# Patient Record
Sex: FEMALE | Race: WHITE | NOT HISPANIC OR LATINO | Employment: OTHER | URBAN - METROPOLITAN AREA
[De-identification: names, ages, dates, MRNs, and addresses within clinical notes are randomized per-mention and may not be internally consistent; named-entity substitution may affect disease eponyms.]

---

## 2017-01-02 ENCOUNTER — TELEPHONE (OUTPATIENT)
Dept: FAMILY MEDICINE | Facility: CLINIC | Age: 72
End: 2017-01-02

## 2017-01-02 NOTE — TELEPHONE ENCOUNTER
I spoke with Saige and reminded her that she is due for a mammogram.  She did not want to schedule at this time.

## 2017-01-31 DIAGNOSIS — F41.9 ANXIETY: Primary | ICD-10-CM

## 2017-01-31 DIAGNOSIS — F32.0 MAJOR DEPRESSIVE DISORDER, SINGLE EPISODE, MILD (H): ICD-10-CM

## 2017-01-31 DIAGNOSIS — J44.9 CHRONIC OBSTRUCTIVE PULMONARY DISEASE, UNSPECIFIED COPD TYPE (H): ICD-10-CM

## 2017-01-31 DIAGNOSIS — B37.0 THRUSH: ICD-10-CM

## 2017-01-31 RX ORDER — ALBUTEROL SULFATE 90 UG/1
2 AEROSOL, METERED RESPIRATORY (INHALATION) EVERY 6 HOURS
Qty: 3 INHALER | Refills: 0 | Status: SHIPPED | OUTPATIENT
Start: 2017-01-31 | End: 2017-04-19

## 2017-01-31 RX ORDER — BUPROPION HYDROCHLORIDE 75 MG/1
150 TABLET ORAL 2 TIMES DAILY
Qty: 360 TABLET | Refills: 0 | Status: SHIPPED | OUTPATIENT
Start: 2017-01-31 | End: 2017-04-07

## 2017-01-31 RX ORDER — TIOTROPIUM BROMIDE 18 UG/1
18 CAPSULE ORAL; RESPIRATORY (INHALATION) DAILY
Qty: 90 CAPSULE | Refills: 0 | Status: SHIPPED | OUTPATIENT
Start: 2017-01-31 | End: 2017-04-07

## 2017-01-31 NOTE — TELEPHONE ENCOUNTER
Ativan 0.5 mg tab      Last Written Prescription Date:  11/10/16  Last Fill Quantity: 10,   # refills: 0  Last Office Visit with [facility], [facility] or  Health prescribing provider: 2/16/16  Future Office visit:       Routing refill request to provider for review/approval because:  Drug not on the [facility], [facility] or [facility] refill protocol or controlled substance    Sertraline HCL 50 mg tab     Last Written Prescription Date: 11/10/16  Last Fill Quantity: 180, # refills: 0  Last Office Visit with [facility] primary care provider:  2/16/16        Last PHQ-9 score on record=   PHQ-9 SCORE 2/16/2016   Total Score -   Total Score 5     Bupropion 75 mg tab       Last Written Prescription Date: 11/10/16  Last Fill Quantity: 360; # refills: 0  Last Office Visit with [facility], [facility] or  Health prescribing provider:  2/16/16        Last PHQ-9 score on record=   PHQ-9 SCORE 2/16/2016   Total Score -   Total Score 5       AST       15   9/4/2014  ALT       14   9/4/2014    Ventolin HFA       Last Written Prescription Date: 11/10/16  Last Fill Quantity: 3, # refills: 0    Last Office Visit with [facility], [facility] or [facility] prescribing provider:  2/16/16   Future Office Visit:       Date of Last Asthma Action Plan Letter:   There are no preventive care reminders to display for this patient.   Asthma Control Test: No flowsheet data found.    Date of Last Spirometry Test:   No results found for this or any previous visit.    Advair Diskus 500/50      Last Written Prescription Date: 11/10/16  Last Fill Quantity: 3,  # refills: 0   Last Office Visit with [facility], [facility] or  Health prescribing provider: 2/16/16                                             Spiriva Handihaler cap      Last Written Prescription Date: 11/10/16  Last Fill Quantity: 90,  # refills: 0   Last Office Visit with [facility], [facility] or  Health prescribing provider: 2/16/16                                             Nystatin [id number]      Last Written Prescription Date: 11/10/16  Last Fill Quantity: 140 ml,  #  refills: 1   Last Office Visit with [facility], [facility] or [facility] prescribing provider: 2/16/16

## 2017-02-01 RX ORDER — LORAZEPAM 0.5 MG/1
0.5 TABLET ORAL EVERY 6 HOURS PRN
Qty: 10 TABLET | Refills: 0 | Status: SHIPPED | OUTPATIENT
Start: 2017-02-01 | End: 2017-04-07

## 2017-02-01 RX ORDER — NYSTATIN 100000/ML
SUSPENSION, ORAL (FINAL DOSE FORM) ORAL
Qty: 140 ML | Refills: 0 | Status: SHIPPED
Start: 2017-02-01 | End: 2017-04-07

## 2017-02-24 ENCOUNTER — TELEPHONE (OUTPATIENT)
Dept: FAMILY MEDICINE | Facility: CLINIC | Age: 72
End: 2017-02-24

## 2017-02-24 DIAGNOSIS — J44.1 COPD EXACERBATION (H): ICD-10-CM

## 2017-02-24 RX ORDER — PREDNISONE 20 MG/1
40 TABLET ORAL DAILY
Qty: 10 TABLET | Refills: 0 | Status: SHIPPED | OUTPATIENT
Start: 2017-02-24 | End: 2017-10-12

## 2017-02-24 RX ORDER — AZITHROMYCIN 250 MG/1
TABLET, FILM COATED ORAL
Qty: 6 TABLET | Refills: 0 | Status: SHIPPED | OUTPATIENT
Start: 2017-02-24 | End: 2017-03-16

## 2017-02-24 NOTE — TELEPHONE ENCOUNTER
Reason for Call:  Other     Detailed comments: Patient is starting to get sick with congestion and was told to call and get meds before she gets too sick - please call pt    Phone Number Patient can be reached at: Home number on file [phone] (home)    Best Time:     Can we leave a detailed message on this number? YES    Call taken on 2/24/2017 at 12:09 PM by Luciana Santiago

## 2017-02-28 ENCOUNTER — TELEPHONE (OUTPATIENT)
Dept: FAMILY MEDICINE | Facility: CLINIC | Age: 72
End: 2017-02-28

## 2017-02-28 PROBLEM — Z53.20 MAMMOGRAM DECLINED: Status: ACTIVE | Noted: 2017-02-28

## 2017-03-16 ENCOUNTER — RADIANT APPOINTMENT (OUTPATIENT)
Dept: GENERAL RADIOLOGY | Facility: CLINIC | Age: 72
End: 2017-03-16
Attending: NURSE PRACTITIONER
Payer: MEDICARE

## 2017-03-16 ENCOUNTER — OFFICE VISIT (OUTPATIENT)
Dept: FAMILY MEDICINE | Facility: CLINIC | Age: 72
End: 2017-03-16
Payer: MEDICARE

## 2017-03-16 VITALS
DIASTOLIC BLOOD PRESSURE: 74 MMHG | BODY MASS INDEX: 19.83 KG/M2 | TEMPERATURE: 98.4 F | OXYGEN SATURATION: 92 % | WEIGHT: 101 LBS | HEART RATE: 114 BPM | SYSTOLIC BLOOD PRESSURE: 130 MMHG | HEIGHT: 60 IN

## 2017-03-16 DIAGNOSIS — G89.29 CHRONIC LEFT SHOULDER PAIN: ICD-10-CM

## 2017-03-16 DIAGNOSIS — G89.29 CHRONIC LEFT SHOULDER PAIN: Primary | ICD-10-CM

## 2017-03-16 DIAGNOSIS — M25.512 CHRONIC LEFT SHOULDER PAIN: ICD-10-CM

## 2017-03-16 DIAGNOSIS — M53.3 PAIN IN THE COCCYX: ICD-10-CM

## 2017-03-16 DIAGNOSIS — M43.17 SPONDYLOLISTHESIS OF LUMBOSACRAL REGION: ICD-10-CM

## 2017-03-16 DIAGNOSIS — M25.512 CHRONIC LEFT SHOULDER PAIN: Primary | ICD-10-CM

## 2017-03-16 PROCEDURE — 99214 OFFICE O/P EST MOD 30 MIN: CPT | Performed by: NURSE PRACTITIONER

## 2017-03-16 PROCEDURE — 72220 X-RAY EXAM SACRUM TAILBONE: CPT

## 2017-03-16 PROCEDURE — 73030 X-RAY EXAM OF SHOULDER: CPT | Mod: LT

## 2017-03-16 ASSESSMENT — ANXIETY QUESTIONNAIRES
2. NOT BEING ABLE TO STOP OR CONTROL WORRYING: SEVERAL DAYS
3. WORRYING TOO MUCH ABOUT DIFFERENT THINGS: SEVERAL DAYS
6. BECOMING EASILY ANNOYED OR IRRITABLE: SEVERAL DAYS
5. BEING SO RESTLESS THAT IT IS HARD TO SIT STILL: NOT AT ALL
1. FEELING NERVOUS, ANXIOUS, OR ON EDGE: SEVERAL DAYS
7. FEELING AFRAID AS IF SOMETHING AWFUL MIGHT HAPPEN: NOT AT ALL
GAD7 TOTAL SCORE: 5

## 2017-03-16 ASSESSMENT — PATIENT HEALTH QUESTIONNAIRE - PHQ9: 5. POOR APPETITE OR OVEREATING: SEVERAL DAYS

## 2017-03-16 NOTE — MR AVS SNAPSHOT
After Visit Summary   3/16/2017    Tari Wiley    MRN: [id number]           Patient Information     Date Of Birth          1945        Visit Information        Provider Department      3/16/2017 9:20 AM Cynthia Guillen APRN [facility]        Today's Diagnoses     Pain in the coccyx    -  1    Chronic left shoulder pain        Spondylolisthesis of lumbosacral region          Care Instructions    Please call [phone] to schedule your CT scan    Physical therapy referral made    Follow up if symptoms do not improve or worsen.          Follow-ups after your visit        Additional Services     PHYSICAL THERAPY REFERRAL       *This therapy referral will be filtered to a centralized scheduling office at [facility] and the patient will receive a call to schedule an appointment at a [place] location most convenient for them. *     [facility] provides Physical Therapy evaluation and treatment and many specialty services across the [place] system.  If requesting a specialty program, please choose from the list below.    If you have not heard from the scheduling office within 2 business days, please call [phone] for all locations, with the exception of [place], please call [phone].  Treatment: Evaluation & Treatment  Special Instructions/Modalities:   Special Programs: None    Please be aware that coverage of these services is subject to the terms and limitations of your health insurance plan.  Call member services at your health plan with any benefit or coverage questions.      **Note to Provider:  If you are referring outside of [place] for the therapy appointment, please list the name of the location in the  special instructions  above, print the referral and give to the patient to schedule the appointment.                  Future tests that were ordered for you today     Open Future Orders        Priority  "Expected Expires Ordered    CT Lumbar Spine w/o Contrast Routine  3/16/2018 3/16/2017            Who to contact     If you have questions or need follow up information about today's clinic visit or your schedule please contact [facility] directly at [phone].  Normal or non-critical lab and imaging results will be communicated to you by MyChart, letter or phone within 4 business days after the clinic has received the results. If you do not hear from us within 7 days, please contact the clinic through [employer] or phone. If you have a critical or abnormal lab result, we will notify you by phone as soon as possible.  Submit refill requests through [employer] or call your pharmacy and they will forward the refill request to us. Please allow 3 business days for your refill to be completed.          Additional Information About Your Visit        MyChart Information     [employer] lets you send messages to your doctor, view your test results, renew your prescriptions, schedule appointments and more. To sign up, go to [email or web address] . Click on \"Log in\" on the left side of the screen, which will take you to the Welcome page. Then click on \"Sign up Now\" on the right side of the page.     You will be asked to enter the access code listed below, as well as some personal information. Please follow the directions to create your username and password.     Your access code is: [email or web address]  Expires: 2017 10:28 AM     Your access code will  in 90 days. If you need help or a new code, please call your [facility] or [phone].        Care EveryWhere ID     This is your Care EveryWhere ID. This could be used by other organizations to access your [facility] medical records  [id number]        Your Vitals Were     Pulse Temperature Height Pulse Oximetry BMI (Body Mass Index)       114 98.4  F (36.9  C) (Tympanic) 5' (1.524 m) 92% 19.73 kg/m2        Blood Pressure from Last 3 Encounters: "   03/16/17 130/74   02/16/16 132/75   08/19/15 134/59    Weight from Last 3 Encounters:   03/16/17 101 lb (45.8 kg)   02/16/16 101 lb 3.2 oz (45.9 kg)   08/19/15 92 lb (41.7 kg)              We Performed the Following     PHYSICAL THERAPY REFERRAL          Today's Medication Changes          These changes are accurate as of: 3/16/17 10:28 AM.  If you have any questions, ask your nurse or doctor.               Stop taking these medicines if you haven't already. Please contact your care team if you have questions.     azithromycin 250 MG tablet   Commonly known as:  ZITHROMAX   Stopped by:  Cynthia Guillen APRN CNP                    Primary Care Provider Office Phone # Fax #    Suzy Adonis Peters [phone]       [facility] [address] [address]        Thank you!     Thank you for choosing [facility]  for your care. Our goal is always to provide you with excellent care. Hearing back from our patients is one way we can continue to improve our services. Please take a few minutes to complete the written survey that you may receive in the mail after your visit with us. Thank you!             Your Updated Medication List - Protect others around you: Learn how to safely use, store and throw away your medicines at www.disposemymeds.org.          This list is accurate as of: 3/16/17 10:28 AM.  Always use your most recent med list.                   Brand Name Dispense Instructions for use    * albuterol (2.5 MG/3ML) 0.083% neb solution     1 Box    Take 1 vial (2.5 mg) by nebulization every 4 hours as needed for shortness of breath / dyspnea       * albuterol 108 (90 BASE) MCG/ACT Inhaler    PROAIR HFA/PROVENTIL HFA/VENTOLIN HFA    3 Inhaler    Inhale 2 puffs into the lungs every 6 hours       buPROPion 75 MG tablet    WELLBUTRIN    360 tablet    Take 2 tablets (150 mg) by mouth 2 times daily       diphenhydrAMINE-acetaminophen  MG tablet     TYLENOL PM     Take 1-2 tablets by mouth nightly as needed.       fluticasone-salmeterol 500-50 MCG/DOSE diskus inhaler    ADVAIR    3 Inhaler    Inhale 1 puff into the lungs every 12 hours       LORazepam 0.5 MG tablet    ATIVAN    10 tablet    Take 1 tablet (0.5 mg) by mouth every 6 hours as needed for anxiety       nystatin [id number] UNIT/ML suspension    MYCOSTATIN    140 mL    Take 5 mLs by mouth 2 times daily (before meals). Swish and spit. If the thrush is not improving you will need to go to 4 times a day, for 7 days       OXYGEN-HELIUM IN      2-3 liters       predniSONE 10 MG tablet    DELTASONE    15 tablet    Take 3 tablets (30 mg) by mouth daily       sertraline 50 MG tablet    ZOLOFT    180 tablet    Take 2 tablets (100 mg) by mouth daily       tiotropium 18 MCG capsule    SPIRIVA HANDIHALER    90 capsule    Inhale 1 capsule (18 mcg) into the lungs daily       TYLENOL 500 MG tablet   Generic drug:  acetaminophen      Take 2 tablets by mouth every 6 hours as needed.       * Notice:  This list has 2 medication(s) that are the same as other medications prescribed for you. Read the directions carefully, and ask your doctor or other care provider to review them with you.

## 2017-03-16 NOTE — NURSING NOTE
Chief Complaint   Patient presents with     Musculoskeletal Problem     left shoulder/arm        Initial /74 (Cuff Size: Adult Small)  Pulse 114  Temp 98.4  F (36.9  C) (Tympanic)  Ht 5' (1.524 m)  Wt 101 lb (45.8 kg)  SpO2 92%  BMI 19.73 kg/m2 Estimated body mass index is 19.73 kg/(m^2) as calculated from the following:    Height as of this encounter: 5' (1.524 m).    Weight as of this encounter: 101 lb (45.8 kg).  Medication Reconciliation: complete    Health Maintenance that is potentially due pending provider review:  Mammogram, Colonoscopy/FIT and PHQ9

## 2017-03-16 NOTE — PROGRESS NOTES
SUBJECTIVE:                                                    Tari Wiley is a 72 year old female who presents to clinic today for the following health issues:      Joint Pain     Onset: 2016    Description:   Location: left shoulder and arm   Character: Sharp with movement     Intensity: moderate    Progression of Symptoms: worse    Accompanying Signs & Symptoms:  Other symptoms: none   History:   Previous similar pain: no       Precipitating factors:   Trauma or overuse: YES- fall 2-3 month ago     Alleviating factors:  Improved by: not using it        Therapies Tried and outcome: none     One week later tripped on oxygen hose and fell on tailbone. Discomfort still present from this fall also.    Cough F/U-  -was prescribed Zithromax - would like lungs listened to.   F/U cough-improved with antibiotics      Problem list and histories reviewed & adjusted, as indicated.  Additional history: as documented    Patient Active Problem List   Diagnosis     Mild major depression (H)     Hyperlipidemia LDL goal <130     Personal history of smoking     Generalized anxiety disorder     Generalized weakness     Anemia     Thrush, oral     Advanced directives, counseling/discussion     Pneumonia     Gastroenteritis     Respiratory failure, acute (H)     COPD (chronic obstructive pulmonary disease) (HCC)     COPD exacerbation (H)     Health Care Home-Not active     Mammogram declined     Past Surgical History   Procedure Laterality Date     Appendectomy       Cholecystectomy, open       C/section, low transverse       , Low Transverse     Surgical history of -        stent to biliary tract     Cataract iol, rt/lt       right-2013       Social History   Substance Use Topics     Smoking status: Former Smoker     Packs/day: 4.00     Years: 36.00     Types: Cigarettes     Quit date: 1993     Smokeless tobacco: Never Used      Comment: Started at age 12     Alcohol use No     Family  History   Problem Relation Age of Onset     Alcohol/Drug Father      CANCER Brother          Current Outpatient Prescriptions   Medication Sig Dispense Refill     LORazepam (ATIVAN) 0.5 MG tablet Take 1 tablet (0.5 mg) by mouth every 6 hours as needed for anxiety 10 tablet 0     nystatin (MYCOSTATIN) 109813 UNIT/ML suspension Take 5 mLs by mouth 2 times daily (before meals). Swish and spit. If the thrush is not improving you will need to go to 4 times a day, for 7 days 140 mL 0     sertraline (ZOLOFT) 50 MG tablet Take 2 tablets (100 mg) by mouth daily 180 tablet 0     buPROPion (WELLBUTRIN) 75 MG tablet Take 2 tablets (150 mg) by mouth 2 times daily 360 tablet 0     albuterol (PROAIR HFA/PROVENTIL HFA/VENTOLIN HFA) 108 (90 BASE) MCG/ACT Inhaler Inhale 2 puffs into the lungs every 6 hours 3 Inhaler 0     fluticasone-salmeterol (ADVAIR) 500-50 MCG/DOSE diskus inhaler Inhale 1 puff into the lungs every 12 hours 3 Inhaler 0     tiotropium (SPIRIVA HANDIHALER) 18 MCG capsule Inhale 1 capsule (18 mcg) into the lungs daily 90 capsule 0     OXYGEN-HELIUM IN 2-3 liters       diphenhydrAMINE-acetaminophen (TYLENOL PM)  MG tablet Take 1-2 tablets by mouth nightly as needed.       acetaminophen (TYLENOL) 500 MG tablet Take 2 tablets by mouth every 6 hours as needed.       albuterol (2.5 MG/3ML) 0.083% nebulizer solution Take 1 vial (2.5 mg) by nebulization every 4 hours as needed for shortness of breath / dyspnea (Patient not taking: Reported on 3/16/2017) 1 Box 5     predniSONE (DELTASONE) 10 MG tablet Take 3 tablets (30 mg) by mouth daily (Patient not taking: Reported on 3/16/2017) 15 tablet 0     Allergies   Allergen Reactions     Sulfa Drugs Swelling and Difficulty breathing     Labs reviewed in EPIC    Reviewed and updated as needed this visit by clinical staff       Reviewed and updated as needed this visit by Provider         ROS:  Constitutional, HEENT, cardiovascular, pulmonary, gi and gu systems are negative,  except as otherwise noted.    OBJECTIVE:                                                    /74 (Cuff Size: Adult Small)  Pulse 114  Temp 98.4  F (36.9  C) (Tympanic)  Ht 5' (1.524 m)  Wt 101 lb (45.8 kg)  SpO2 92%  BMI 19.73 kg/m2  Body mass index is 19.73 kg/(m^2).  GENERAL: healthy, alert and no distress  RESP: decreased breath sounds throughout  CV: regular rate and rhythm, normal S1 S2, no S3 or S4, no murmur, click or rub  MS: tenderness to palpation left bicep, left shoulder range of motion and strength significantly limited by discomfort, mid coccyx with tenderness to palpation  PSYCH: mentation appears normal, affect normal/bright    Diagnostic Test Results:  Xray -   XR SHOULDER LT G/E 3 VW  3/16/2017 10:12 AM    HISTORY:  Pain in left shoulder, Other chronic pain    COMPARISON:  None.             Impression             IMPRESSION:  Negative.     NAHED WEBER MD           ASSESSMENT/PLAN:                                                      1. Chronic left shoulder pain  No fracture on x ray.  Recommend that [place] start PT for ongoing symptoms.  Offered injection in clinic today-[place] declined.  Symptomatic care and follow up discussed.  - XR Shoulder Left G/E 3 Views; Future  - PHYSICAL THERAPY REFERRAL    2. Spondylolisthesis of lumbosacral region  Concern for spondylolistesis on x ray-will do CT scan to evaluate.  No neuro symptoms present.  Symptomatic care and follow up discussed    - CT Lumbar Spine w/o Contrast; Future    3. Pain in the coccyx    - XR Sacrum and Coccyx 2 Views; Future    Home care instructions were reviewed with the patient. The risks, benefits and treatment options of prescribed medications or other treatments have been discussed with the patient. The patient verbalized their understanding and should call or follow up if no improvement or if they develop further problems.      Patient Instructions   Please call [phone] to schedule your CT scan    Physical therapy  referral made    Follow up if symptoms do not improve or worsen.        MARYJANE Klein [facility]

## 2017-03-16 NOTE — PATIENT INSTRUCTIONS
Please call [phone] to schedule your CT scan    Physical therapy referral made    Follow up if symptoms do not improve or worsen.

## 2017-03-17 ENCOUNTER — HOSPITAL ENCOUNTER (OUTPATIENT)
Dept: PHYSICAL THERAPY | Facility: CLINIC | Age: 72
Setting detail: THERAPIES SERIES
End: 2017-03-17
Attending: NURSE PRACTITIONER
Payer: MEDICARE

## 2017-03-17 PROCEDURE — 97161 PT EVAL LOW COMPLEX 20 MIN: CPT | Mod: GP | Performed by: PHYSICAL THERAPIST

## 2017-03-17 PROCEDURE — G8982 BODY POS GOAL STATUS: HCPCS | Mod: GP,CJ | Performed by: PHYSICAL THERAPIST

## 2017-03-17 PROCEDURE — 97140 MANUAL THERAPY 1/> REGIONS: CPT | Mod: GP | Performed by: PHYSICAL THERAPIST

## 2017-03-17 PROCEDURE — 97110 THERAPEUTIC EXERCISES: CPT | Mod: GP | Performed by: PHYSICAL THERAPIST

## 2017-03-17 PROCEDURE — G8981 BODY POS CURRENT STATUS: HCPCS | Mod: GP,CL | Performed by: PHYSICAL THERAPIST

## 2017-03-17 PROCEDURE — 40000718 ZZHC STATISTIC PT DEPARTMENT ORTHO VISIT: Performed by: PHYSICAL THERAPIST

## 2017-03-17 ASSESSMENT — PATIENT HEALTH QUESTIONNAIRE - PHQ9: SUM OF ALL RESPONSES TO PHQ QUESTIONS 1-9: 3

## 2017-03-17 ASSESSMENT — ANXIETY QUESTIONNAIRES: GAD7 TOTAL SCORE: 5

## 2017-03-17 NOTE — PROGRESS NOTES
03/17/17 1200   General Information   Type of Visit Initial OP Ortho PT Evaluation   Start of Care Date 03/17/17   Referring Physician Cynthia Guillen APRN CNP    Patient/Family Goals Statement reduce pain    Orders Evaluate and Treat   Date of Order 03/16/17   Insurance Type Medicare   Medical Diagnosis Chronic left shoulder pain M25.512, G89.29   Surgical/Medical history reviewed Yes   Precautions/Limitations no known precautions/limitations   Body Part(s)   Body Part(s) Shoulder   Presentation and Etiology   Pertinent history of current problem (include personal factors and/or comorbidities that impact the POC) Pt reports she had a fall in the house, she landed on forearm. X-ray negative  Pt is R handed.  Denies numbness and tingling.  Reaching  and getting dressed  is the worst. Pt does not need to return to the dark. PMH:    Impairments A. Pain;D. Decreased ROM;F. Decreased strength and endurance;G. Impaired balance;Q. Dizziness   Functional Limitations perform activities of daily living   Symptom Location L shoulder   Onset date of current episode/exacerbation 11/01/17   Chronicity New   Pain rating (0-10 point scale) Best (/10);Worst (/10)   Best (/10) 2   Worst (/10) 10   Pain quality A. Sharp;C. Aching;B. Dull;E. Shooting;F. Stabbing   Frequency of pain/symptoms A. Constant   Pain/symptoms exacerbated by C. Lifting;D. Carrying;G. Certain positions;H. Overhead reach;J. ADL;K. Home tasks   Pain/symptoms eased by C. Rest;D. Nothing;E. Changing positions   Progression of symptoms since onset: Worsened   Current Level of Function   Current Community Support Family/friend caregiver   Patient role/employment history C. Homemaker   Living environment [place]   Fall Risk Screen   Fall screen completed by PT   Per patient - Fall 2 or more times in past year? No   Per patient - Fall with injury in past year? Yes   Timed Up and Go score (seconds) 9 secs   Is patient a fall risk? 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    Shoulder Flexibility Comments biceps: 5/5, triceps: 5/5    Olivares-Marco Test pos   Sulcus Test neg   Crossover Test neg   Shoulder Special Tests Comments bear hug: neg, speed: pos   Palpation TTP medial boarder of scapular, RTC inseriont    Left Shoulder Flexion AROM 90   Left Shoulder Flexion PROM 140    Left Shoulder Abduction AROM 70   Left Shoulder Abduction PROM 90   Left Shoulder ER AROM C4   Left Shoulder ER PROM 70   Left Shoulder IR AROM L hip    Left Shoulder IR PROM 30    Left Shoulder Flexion Strength 4/5   Left Shoulder Abduction Strength 3/5   Left Shoulder ER Strength 4/5   Left Shoulder IR Strength 4/5   Planned Therapy Interventions   Planned Therapy Interventions balance training;joint mobilization;manual therapy;neuromuscular re-education;strengthening;ROM;stretching   Planned Modality Interventions   Planned Modality Interventions Cryotherapy;Electrical stimulation;Shortwave diathermy;Ultrasound   Clinical Impression   Criteria for Skilled Therapeutic Interventions Met yes, treatment indicated   PT Diagnosis decreased ROM associated with mechanical dysfunction   Influenced by the following impairments weakness, decreased ROM, pain   Functional limitations due to impairments lifting, ADL's, dressing   Clinical Presentation Stable/Uncomplicated   Clinical Presentation Rationale Pt is pleasant 72 yr old woman who presents s/p fall and L shoulder pain. pt denies hx of shoulder pain or fracture and is healthy besides COPD. Pt does report she hates exercise thus may not be compliant with HEP.    Clinical Decision Making (Complexity) Low complexity   Therapy Frequency 2 times/Week   Predicted Duration of Therapy Intervention (days/wks) 6 weeks   Risk & Benefits of therapy have been explained Yes    Patient, Family & other staff in agreement with plan of care Yes   Education Assessment   Preferred Learning Style Listening;Demonstration;Pictures/video;Reading   Barriers to Learning No barriers   ORTHO GOALS   PT Ortho Eval Goals 1;2;4;3   Ortho Goal 1   Goal Identifier ROM   Goal Description Pt will demonstrate full  GH AROM with less than 1/10 pain in order to be able to don/doff jacket/shirt to return to PLOF w/o  pain.   Target Date 04/07/17   Ortho Goal 2   Goal Identifier ROM   Goal Description Pt will demonstrate 180 deg GH AROM flex w less than 1/10 pain  to allow her to reach overhead  into cupboard to put dishes away without pain   Target Date 04/07/17   Ortho Goal 3   Goal Identifier mmt   Goal Description Pt will demonstrate 5/5 GH abd/add/ ER/IR in order to be able to return to PLOF and complete ADL's    Target Date 04/28/17   Ortho Goal 4   Goal Identifier SPADI   Goal Description Pt will report <20% disability on SPADI to demonstrate improved ability to complete daily activities and demonstrate significant clinical improvement   Target Date 04/28/17   Total Evaluation Time   Total Evaluation Time 50 (25 eval, 10 TE, 15 MT_)   Therapy Certification   Certification date from 03/17/17   Certification date to 04/28/17   Medical Diagnosis Chronic left shoulder pain M25.512, G89.29     Deepika Walton  Physical Therapist  [address] [address]  [email or web address]   [email or web address]   Office: [phone] Fax: [phone]

## 2017-03-17 NOTE — PROGRESS NOTES
[facility]          OUTPATIENT PHYSICAL THERAPY ORTHOPEDIC EVALUATION  PLAN OF TREATMENT FOR OUTPATIENT REHABILITATION  (COMPLETE FOR INITIAL CLAIMS ONLY)  Patient's Last Name, First Name, M.I.  YOB: 1945  Tari Wiley    Provider s Name:  [facility]   Medical Record No.  [id number]   Start of Care Date:  03/17/17   Onset Date:  11/01/17   Type:     _X__PT   ___OT   ___SLP Medical Diagnosis:  Chronic left shoulder pain M25.512, G89.29     PT Diagnosis:  decreased ROM associated with mechanical dysfunction   Visits from SOC:  1      _________________________________________________________________________________  Plan of Treatment/Functional Goals:  balance training, joint mobilization, manual therapy, neuromuscular re-education, strengthening, ROM, stretching     Cryotherapy, Electrical stimulation, Shortwave diathermy, Ultrasound     Goals  Goal Identifier: ROM  Goal Description: Pt will demonstrate full  GH AROM with less than 1/10 pain in order to be able to don/doff jacket/shirt to return to PLOF w/o  pain.  Target Date: 04/07/17    Goal Identifier: ROM  Goal Description: Pt will demonstrate 180 deg GH AROM flex w less than 1/10 pain  to allow her to reach overhead  into cupboard to put dishes away without pain  Target Date: 04/07/17    Goal Identifier: mmt  Goal Description: Pt will demonstrate 5/5 GH abd/add/ ER/IR in order to be able to return to PLOF and complete ADL's   Target Date: 04/28/17    Goal Identifier: SPADI  Goal Description: Pt will report <20% disability on SPADI to demonstrate improved ability to complete daily activities and demonstrate significant clinical improvement  Target Date: 04/28/17          Therapy Frequency:  2 times/Week  Predicted Duration of Therapy Intervention:  6 weeks    Deepika Walton, PT                 I CERTIFY THE NEED FOR THESE SERVICES FURNISHED UNDER        THIS PLAN OF TREATMENT AND WHILE UNDER MY  CARE     (Physician co-signature of this document indicates review and certification of the therapy plan).                       Certification Date From:  03/17/17   Certification Date To:  04/28/17    Referring Provider:  Cynthia Guillen APRN CNP     Initial Assessment        See Epic Evaluation Start of Care Date: 03/17/17

## 2017-03-23 ENCOUNTER — HOSPITAL ENCOUNTER (OUTPATIENT)
Dept: CT IMAGING | Facility: CLINIC | Age: 72
Discharge: HOME | End: 2017-03-23
Attending: NURSE PRACTITIONER | Admitting: NURSE PRACTITIONER
Payer: MEDICARE

## 2017-03-23 DIAGNOSIS — M43.17 SPONDYLOLISTHESIS OF LUMBOSACRAL REGION: ICD-10-CM

## 2017-03-23 PROCEDURE — 72131 CT LUMBAR SPINE W/O DYE: CPT

## 2017-03-24 ENCOUNTER — HOSPITAL ENCOUNTER (OUTPATIENT)
Dept: PHYSICAL THERAPY | Facility: CLINIC | Age: 72
Setting detail: THERAPIES SERIES
End: 2017-03-24
Attending: NURSE PRACTITIONER
Payer: MEDICARE

## 2017-03-24 PROCEDURE — 97110 THERAPEUTIC EXERCISES: CPT | Mod: GP | Performed by: PHYSICAL THERAPIST

## 2017-03-24 PROCEDURE — 40000718 ZZHC STATISTIC PT DEPARTMENT ORTHO VISIT: Performed by: PHYSICAL THERAPIST

## 2017-03-24 PROCEDURE — 97140 MANUAL THERAPY 1/> REGIONS: CPT | Mod: GP | Performed by: PHYSICAL THERAPIST

## 2017-03-31 ENCOUNTER — HOSPITAL ENCOUNTER (OUTPATIENT)
Dept: PHYSICAL THERAPY | Facility: CLINIC | Age: 72
Setting detail: THERAPIES SERIES
End: 2017-03-31
Attending: NURSE PRACTITIONER
Payer: MEDICARE

## 2017-03-31 PROCEDURE — 40000718 ZZHC STATISTIC PT DEPARTMENT ORTHO VISIT: Performed by: PHYSICAL THERAPIST

## 2017-03-31 PROCEDURE — G8982 BODY POS GOAL STATUS: HCPCS | Mod: GP,CJ | Performed by: PHYSICAL THERAPIST

## 2017-03-31 PROCEDURE — 97140 MANUAL THERAPY 1/> REGIONS: CPT | Mod: GP | Performed by: PHYSICAL THERAPIST

## 2017-03-31 PROCEDURE — G8983 BODY POS D/C STATUS: HCPCS | Mod: GP,CL | Performed by: PHYSICAL THERAPIST

## 2017-04-07 DIAGNOSIS — F41.9 ANXIETY: ICD-10-CM

## 2017-04-07 DIAGNOSIS — J44.9 CHRONIC OBSTRUCTIVE PULMONARY DISEASE, UNSPECIFIED COPD TYPE (H): ICD-10-CM

## 2017-04-07 DIAGNOSIS — B37.0 THRUSH: ICD-10-CM

## 2017-04-07 DIAGNOSIS — F32.0 MAJOR DEPRESSIVE DISORDER, SINGLE EPISODE, MILD (H): ICD-10-CM

## 2017-04-07 RX ORDER — NYSTATIN 100000/ML
SUSPENSION, ORAL (FINAL DOSE FORM) ORAL
Qty: 180 ML | Refills: 0 | Status: SHIPPED | OUTPATIENT
Start: 2017-04-07 | End: 2017-11-28

## 2017-04-07 RX ORDER — BUPROPION HYDROCHLORIDE 75 MG/1
TABLET ORAL
Qty: 360 TABLET | Refills: 0 | Status: SHIPPED | OUTPATIENT
Start: 2017-04-07 | End: 2017-10-27

## 2017-04-07 RX ORDER — TIOTROPIUM BROMIDE 18 UG/1
CAPSULE ORAL; RESPIRATORY (INHALATION)
Qty: 90 CAPSULE | Refills: 0 | Status: SHIPPED | OUTPATIENT
Start: 2017-04-07 | End: 2017-04-19

## 2017-04-07 RX ORDER — ALBUTEROL SULFATE 90 UG/1
AEROSOL, METERED RESPIRATORY (INHALATION)
Qty: 1 INHALER | Refills: 1 | Status: SHIPPED | OUTPATIENT
Start: 2017-04-07 | End: 2017-11-27

## 2017-04-07 RX ORDER — LORAZEPAM 0.5 MG/1
TABLET ORAL
Qty: 10 TABLET | Refills: 0 | Status: SHIPPED | OUTPATIENT
Start: 2017-04-07 | End: 2017-08-04

## 2017-04-07 NOTE — TELEPHONE ENCOUNTER
ventolin       Last Written Prescription Date: 1/31/2017  Last Fill Quantity: 3, # refills: 0    Last Office Visit with [facility], [email or web address] or [facility] prescribing provider:  3/16/2017   Future Office Visit:       Date of Last Asthma Action Plan Letter:   There are no preventive care reminders to display for this patient.   Asthma Control Test: No flowsheet data found.    Date of Last Spirometry Test:   No results found for this or any previous visit.    spiriva       Last Written Prescription Date: 1/31/2017  Last Fill Quantity: 90, # refills: 0    Last Office Visit with [facility], [email or web address] or [facility] prescribing provider:  3/16/2017   Future Office Visit:       Date of Last Asthma Action Plan Letter:   There are no preventive care reminders to display for this patient.   Asthma Control Test: No flowsheet data found.    Date of Last Spirometry Test:   No results found for this or any previous visit.    lorazepam      Last Written Prescription Date:  2/1/2017  Last Fill Quantity: 10,   # refills: 0  Last Office Visit with [facility], [email or web address] or [facility] prescribing provider: 3/16/2017  Future Office visit:       Routing refill request to provider for review/approval because:  Drug not on the [facility] or [facility] refill protocol or controlled substance

## 2017-04-19 DIAGNOSIS — J44.9 CHRONIC OBSTRUCTIVE PULMONARY DISEASE, UNSPECIFIED COPD TYPE (H): ICD-10-CM

## 2017-04-21 RX ORDER — ALBUTEROL SULFATE 90 UG/1
2 AEROSOL, METERED RESPIRATORY (INHALATION) EVERY 6 HOURS
Qty: 3 INHALER | Refills: 3 | Status: SHIPPED | OUTPATIENT
Start: 2017-04-21 | End: 2017-06-22

## 2017-04-21 RX ORDER — TIOTROPIUM BROMIDE 18 UG/1
CAPSULE ORAL; RESPIRATORY (INHALATION)
Qty: 90 CAPSULE | Refills: 3 | Status: SHIPPED | OUTPATIENT
Start: 2017-04-21 | End: 2017-06-22

## 2017-06-02 NOTE — PROGRESS NOTES
Outpatient Physical Therapy Discharge Note     Patient: Tari Wiley  : 1945    Beginning/End Dates of Reporting Period:  17 to 2017    Referring Provider: Cynthia Guillen CNP, MARYJANE    Therapy Diagnosis: decreased ROM associated with mechanical dysfunction     Client Self Report: Pt reports shoulder continues to be sore. She reports she got CT scan but didnt understand it. SHe reports she did not complete HEP because it hurts hr arm.     Objective Measurements:  Objective Measure: flex  Details: 110 in supine     Objective Measure: abd     Objective Measure: IR     Objective Measure: ER           Goals:  Goal Identifier ROM   Goal Description Pt will demonstrate full  GH AROM with less than 1/10 pain in order to be able to don/doff jacket/shirt to return to PLOF w/o  pain.   Target Date 17   Date Met      Progress:not met     Goal Identifier ROM   Goal Description Pt will demonstrate 180 deg GH AROM flex w less than 1/10 pain  to allow her to reach overhead  into cupboard to put dishes away without pain   Target Date 17   Date Met      Progress:not met     Goal Identifier mmt   Goal Description Pt will demonstrate 5/5 GH abd/add/ ER/IR in order to be able to return to PLOF and complete ADL's    Target Date 17   Date Met      Progress:not met     Goal Identifier SPADI   Goal Description Pt will report <20% disability on SPADI to demonstrate improved ability to complete daily activities and demonstrate significant clinical improvement   Target Date 17   Date Met      Progress:not assessed       Progress Toward Goals:   Progress this reporting period: Pt has been seen for 3 visits thus far in therapy. In that time, pt made very min gains and was encouraged to f/u with PCP. Pt encoruaged to rest, ice and complete HEP to get bettter as she was not very compliant. Pt has failed to schedule f/u visits within 30 days from last visit as instructed thus is being d/c from therapy  at this time      Plan:  Discharge from therapy.    Discharge:    Reason for Discharge: Patient has failed to schedule further appointments.    Equipment Issued: NA    Discharge Plan: Patient to continue home program.    Deepika Walton  Physical Therapist  [facility]  [address] [place] [id number]  [email or web address]   [email or web address]   Office: [phone] Fax: [phone]

## 2017-06-21 ENCOUNTER — TELEPHONE (OUTPATIENT)
Dept: FAMILY MEDICINE | Facility: CLINIC | Age: 72
End: 2017-06-21

## 2017-06-21 NOTE — TELEPHONE ENCOUNTER
"Reason for Call:  Other call back    Detailed comments: patient is calling stating she has been coughing for 3 days and is now coughing up green \"stuff\". She was told by Dr. Peters if this should ever happen, she should call in and Dr. Peters could prescribe something for her.    Phone Number Patient can be reached at: Home number on file [phone] (home)    Best Time: any    Can we leave a detailed message on this number? YES   Patient used the  [facility] in [place], [facility].  Dianna Tyler  [facility] [place] [facility]      Call taken on 6/21/2017 at 1:32 PM by Dianna Tyler      "

## 2017-06-21 NOTE — TELEPHONE ENCOUNTER
"S-(situation): I talked with Saige    B-(background): \"I have the same old stuff that I always have\".  No fever.  Not sick she says, just wanted to get antibiotic before got sick    A-(assessment): medication request    R-(recommendations): advised needs appointment.  Patient refused offer for appointment  Petra Colbert RN      "

## 2017-06-22 DIAGNOSIS — J44.9 CHRONIC OBSTRUCTIVE PULMONARY DISEASE, UNSPECIFIED COPD TYPE (H): ICD-10-CM

## 2017-06-22 RX ORDER — ALBUTEROL SULFATE 90 UG/1
2 AEROSOL, METERED RESPIRATORY (INHALATION) EVERY 6 HOURS
Qty: 4 INHALER | Refills: 3 | Status: SHIPPED | OUTPATIENT
Start: 2017-06-22 | End: 2017-11-27

## 2017-06-22 RX ORDER — TIOTROPIUM BROMIDE 18 UG/1
CAPSULE ORAL; RESPIRATORY (INHALATION)
Qty: 90 CAPSULE | Refills: 3 | Status: SHIPPED | OUTPATIENT
Start: 2017-06-22 | End: 2017-11-27

## 2017-06-22 NOTE — TELEPHONE ENCOUNTER
I am in process of applying to  assistance programs for Saige's Advair Diskus, Ventolin HFA & Spiriva Handihaler.    [employer] assistance and [employer] require hand signed, hard copy, brand name scripts be submitted with their applications.    Please hand sign BRAND name, hard copy scripts for: ADVAIR DISKUS, VENTOLIN HFA & SPIRIVA HANDIHALER    Please send the script to me via interoffice mail [employer] [facility], to Noy Pickens or via US mail  at:   [facility]   Noy Pickens   [address] [place]  [address]    Thanks so much for your help!    Noy Pickens  Prescription

## 2017-06-26 ENCOUNTER — TELEPHONE (OUTPATIENT)
Dept: FAMILY MEDICINE | Facility: CLINIC | Age: 72
End: 2017-06-26

## 2017-06-26 NOTE — TELEPHONE ENCOUNTER
Patient Assistance Program- [employer] form signed by Dr Peters and attached Rx's for Spiriva, Albiterol inhaler and Advair sent via inner office mail to Noy Pickens-Patient assistance Program-[facility]

## 2017-07-10 ENCOUNTER — TELEPHONE (OUTPATIENT)
Dept: FAMILY MEDICINE | Facility: CLINIC | Age: 72
End: 2017-07-10

## 2017-07-10 NOTE — TELEPHONE ENCOUNTER
ANKUSH Moulton has been approved to  assistance programs for Spiriva handihaler, Advair diskus & Ventolin HFA through December 2017.  She will receive this medication at no cost for the enrollment period.    A 90 day supply of Spiriva Handihaler's, Advair Diskus & Ventolin HFA will be delivered to Saige's home within 7-10 business days.    Saige will contact my office for refills as we must work directly with the .  I will note EPIC as each refill is requested.    Thanks so much for your help!    Noy Pickens  Prescription

## 2017-07-13 ENCOUNTER — TELEPHONE (OUTPATIENT)
Dept: FAMILY MEDICINE | Facility: CLINIC | Age: 72
End: 2017-07-13

## 2017-07-13 NOTE — TELEPHONE ENCOUNTER
Panel Management Review      Patient has the following on her problem list:     Depression / Dysthymia review  PHQ-9 SCORE 8/13/2015 2/16/2016 3/16/2017   Total Score 3 - -   Total Score - 5 3      Patient is due for:  DAP      IVD   ASA: FAILED    Last LDL:    Lab Results   Component Value Date    CHOL 264 02/26/2008     Lab Results   Component Value Date    HDL 71 02/26/2008     Lab Results   Component Value Date     02/26/2008     Lab Results   Component Value Date    TRIG 297 05/31/2012        Lab Results   Component Value Date    CHOLHDLRATIO 4.0 02/26/2008        Is the patient on a Statin? NO   Is the patient on Aspirin? NO                    Last three blood pressure readings:  BP Readings from Last 3 Encounters:   03/16/17 130/74   02/16/16 132/75   08/19/15 134/59        Tobacco History:     History   Smoking Status     Former Smoker     Packs/day: 4.00     Years: 36.00     Types: Cigarettes     Quit date: 12/16/1993   Smokeless Tobacco     Never Used     Comment: Started at age 12         Composite cancer screening  Chart review shows that this patient is due/due soon for the following Mammogram and Colonoscopy  Summary:    Patient is due/failing the following:   ASA, COLONOSCOPY, DAP, LDL, MAMMOGRAM and SPIROMETRY    Action needed:   Patient needs office visit for a physical with fasting labs.    Type of outreach:    Sent letter.    Questions for provider review:    None                                                                                                                                    Radha Padilla MA      Chart routed to Care Team .

## 2017-07-13 NOTE — LETTER
[facility]  [address] [place] [id number] [phone]      July 13, 2017      Tari Wiley  [address] [facility] [address]              Dear Tari Wiley,    Your clinic record indicates that you are due for complete physical exam and fasting labs. Please call the  at [phone] to schedule an appointment.     If we indicated that you are due for cholesterol testing, please come in fasting for twelve hours prior to the test.     You are also due for a colonoscopy and mammogram. Please call [facility] at [phone] to schedule your colonoscopy. You can schedule your mammogram at any of the locations below.  [facility] [place] ~ [phone]  One Day weekly- Alternating Days    [place] ~ [phone]  Every other Monday or Wednesday   & one Saturday morning a month    [employer] ~ [phone]  Every Other Monday morning    [place] ~ [phone]  Every other Monday afternoon    [place] ~ [phone]  Every Monday morning  Every Tuesday afternoon  Wed, Thurs, Friday morning & afternoon  If you have questions about this letter please contact your provider.    Sincerely,  Your [facility] Team/ ss

## 2017-08-04 DIAGNOSIS — F41.9 ANXIETY: ICD-10-CM

## 2017-08-07 RX ORDER — LORAZEPAM 0.5 MG/1
TABLET ORAL
Qty: 10 TABLET | Refills: 0 | Status: SHIPPED | OUTPATIENT
Start: 2017-08-07 | End: 2017-10-12

## 2017-08-07 NOTE — TELEPHONE ENCOUNTER
LORazepam (ATIVAN) 0.5 MG tablet      Last Written Prescription Date:  4/7/17  Last Fill Quantity: 10,   # refills: 0  Last Office Visit with [facility], [facility] or [facility] prescribing provider: 3/16/17  Future Office visit:       Routing refill request to provider for review/approval because:  Drug not on the [facility], [facility] or [facility] refill protocol or controlled substance

## 2017-10-04 ENCOUNTER — TELEPHONE (OUTPATIENT)
Dept: FAMILY MEDICINE | Facility: CLINIC | Age: 72
End: 2017-10-04

## 2017-10-04 NOTE — TELEPHONE ENCOUNTER
FYI~ A refill request has been made to the [employer] assistance programs for Advair & Spiriva.    A 90 day supply of ADVAIR DISKUS & SPIRIVA HANDIHALER will be delivered to Saige's home within 7-10 business days.    Noy Pickens  Prescription

## 2017-10-12 ENCOUNTER — TELEPHONE (OUTPATIENT)
Dept: FAMILY MEDICINE | Facility: CLINIC | Age: 72
End: 2017-10-12

## 2017-10-12 DIAGNOSIS — F41.9 ANXIETY: ICD-10-CM

## 2017-10-12 DIAGNOSIS — J44.1 COPD EXACERBATION (H): ICD-10-CM

## 2017-10-12 RX ORDER — AZITHROMYCIN 250 MG/1
TABLET, FILM COATED ORAL
Qty: 6 TABLET | Refills: 0 | Status: SHIPPED | OUTPATIENT
Start: 2017-10-12 | End: 2017-11-27

## 2017-10-12 RX ORDER — LORAZEPAM 0.5 MG/1
0.5 TABLET ORAL EVERY 6 HOURS
Qty: 10 TABLET | Refills: 0 | Status: SHIPPED | OUTPATIENT
Start: 2017-10-12 | End: 2017-11-27

## 2017-10-12 RX ORDER — LORAZEPAM 0.5 MG/1
0.5 TABLET ORAL EVERY 6 HOURS
Qty: 10 TABLET | Refills: 0 | Status: SHIPPED | OUTPATIENT
Start: 2017-10-12 | End: 2017-10-12

## 2017-10-12 RX ORDER — PREDNISONE 20 MG/1
40 TABLET ORAL DAILY
Qty: 10 TABLET | Refills: 0 | Status: SHIPPED | OUTPATIENT
Start: 2017-10-12 | End: 2017-11-27

## 2017-10-12 RX ORDER — LORAZEPAM 0.5 MG/1
TABLET ORAL
Qty: 10 TABLET | Refills: 0 | Status: SHIPPED | OUTPATIENT
Start: 2017-10-12 | End: 2017-10-12

## 2017-10-12 NOTE — TELEPHONE ENCOUNTER
Pt has chest tightness and green sputum. She would like antibiotics.  Please send to [facility] in [place].

## 2017-10-12 NOTE — TELEPHONE ENCOUNTER
LORazepam (ATIVAN) 0.5 MG tablet      Last Written Prescription Date:  8/7/*17  Last Fill Quantity: 10,   # refills: 0  Last Office Visit with [facility] provider: 3/16/17  Future Office visit:    Next 5 appointments (look out 90 days)     Oct 16, 2017  9:20 AM CDT   SHORT with Suzy Peters MD   [facility]    [address] [place] [id number]   [phone]                   Routing refill request to provider for review/approval because:  Drug not on the [facility] or [facility] refill protocol or controlled substance

## 2017-10-15 ENCOUNTER — TELEPHONE (OUTPATIENT)
Dept: FAMILY MEDICINE | Facility: CLINIC | Age: 72
End: 2017-10-15

## 2017-10-15 NOTE — TELEPHONE ENCOUNTER
Spoke with patient    She is due for OV with primary    Had appt made for 10/16/17 and wanted that to be cancelled     Will schedule appt when ready    Minerva Saba MA

## 2017-10-27 DIAGNOSIS — F32.0 MAJOR DEPRESSIVE DISORDER, SINGLE EPISODE, MILD (H): ICD-10-CM

## 2017-10-30 RX ORDER — BUPROPION HYDROCHLORIDE 75 MG/1
TABLET ORAL
Qty: 360 TABLET | Refills: 0 | Status: SHIPPED | OUTPATIENT
Start: 2017-10-30 | End: 2017-11-27

## 2017-11-27 ENCOUNTER — OFFICE VISIT (OUTPATIENT)
Dept: FAMILY MEDICINE | Facility: CLINIC | Age: 72
End: 2017-11-27
Payer: MEDICARE

## 2017-11-27 VITALS
SYSTOLIC BLOOD PRESSURE: 118 MMHG | WEIGHT: 98.4 LBS | DIASTOLIC BLOOD PRESSURE: 76 MMHG | BODY MASS INDEX: 19.22 KG/M2 | TEMPERATURE: 98.5 F | HEART RATE: 118 BPM | OXYGEN SATURATION: 90 %

## 2017-11-27 DIAGNOSIS — R53.1 GENERALIZED WEAKNESS: ICD-10-CM

## 2017-11-27 DIAGNOSIS — Z23 NEED FOR PROPHYLACTIC VACCINATION AND INOCULATION AGAINST INFLUENZA: ICD-10-CM

## 2017-11-27 DIAGNOSIS — F41.9 ANXIETY: ICD-10-CM

## 2017-11-27 DIAGNOSIS — F32.0 MAJOR DEPRESSIVE DISORDER, SINGLE EPISODE, MILD (H): ICD-10-CM

## 2017-11-27 DIAGNOSIS — J44.9 CHRONIC OBSTRUCTIVE PULMONARY DISEASE, UNSPECIFIED COPD TYPE (H): Primary | ICD-10-CM

## 2017-11-27 DIAGNOSIS — K21.9 GASTROESOPHAGEAL REFLUX DISEASE WITHOUT ESOPHAGITIS: ICD-10-CM

## 2017-11-27 PROCEDURE — 99214 OFFICE O/P EST MOD 30 MIN: CPT | Mod: 25 | Performed by: FAMILY MEDICINE

## 2017-11-27 PROCEDURE — G0008 ADMIN INFLUENZA VIRUS VAC: HCPCS | Performed by: FAMILY MEDICINE

## 2017-11-27 PROCEDURE — 90662 IIV NO PRSV INCREASED AG IM: CPT | Performed by: FAMILY MEDICINE

## 2017-11-27 RX ORDER — LORAZEPAM 0.5 MG/1
0.5 TABLET ORAL EVERY 6 HOURS
Qty: 10 TABLET | Refills: 0 | Status: SHIPPED | OUTPATIENT
Start: 2017-11-27 | End: 2017-11-27

## 2017-11-27 RX ORDER — TIOTROPIUM BROMIDE 18 UG/1
CAPSULE ORAL; RESPIRATORY (INHALATION)
Qty: 90 CAPSULE | Refills: 3 | Status: SHIPPED | OUTPATIENT
Start: 2017-11-27 | End: 2018-02-20

## 2017-11-27 RX ORDER — ALBUTEROL SULFATE 0.83 MG/ML
1 SOLUTION RESPIRATORY (INHALATION) EVERY 4 HOURS PRN
Qty: 1 BOX | Refills: 5 | Status: SHIPPED | OUTPATIENT
Start: 2017-11-27 | End: 2021-05-11

## 2017-11-27 RX ORDER — BUPROPION HYDROCHLORIDE 75 MG/1
TABLET ORAL
Qty: 360 TABLET | Refills: 3 | Status: SHIPPED | OUTPATIENT
Start: 2017-11-27 | End: 2018-12-27

## 2017-11-27 RX ORDER — ALBUTEROL SULFATE 90 UG/1
2 AEROSOL, METERED RESPIRATORY (INHALATION) EVERY 6 HOURS
Qty: 4 INHALER | Refills: 3 | Status: SHIPPED | OUTPATIENT
Start: 2017-11-27 | End: 2018-02-20

## 2017-11-27 RX ORDER — SUCRALFATE 1 G/1
1 TABLET ORAL 4 TIMES DAILY
Qty: 120 TABLET | Refills: 1 | Status: SHIPPED | OUTPATIENT
Start: 2017-11-27 | End: 2019-03-07

## 2017-11-27 RX ORDER — LORAZEPAM 0.5 MG/1
0.5 TABLET ORAL EVERY 6 HOURS
Qty: 20 TABLET | Refills: 5 | Status: SHIPPED | OUTPATIENT
Start: 2017-11-27 | End: 2018-05-26

## 2017-11-27 ASSESSMENT — PATIENT HEALTH QUESTIONNAIRE - PHQ9
SUM OF ALL RESPONSES TO PHQ QUESTIONS 1-9: 5
5. POOR APPETITE OR OVEREATING: SEVERAL DAYS

## 2017-11-27 ASSESSMENT — ANXIETY QUESTIONNAIRES
1. FEELING NERVOUS, ANXIOUS, OR ON EDGE: MORE THAN HALF THE DAYS
6. BECOMING EASILY ANNOYED OR IRRITABLE: NOT AT ALL
2. NOT BEING ABLE TO STOP OR CONTROL WORRYING: SEVERAL DAYS
3. WORRYING TOO MUCH ABOUT DIFFERENT THINGS: SEVERAL DAYS
5. BEING SO RESTLESS THAT IT IS HARD TO SIT STILL: SEVERAL DAYS
GAD7 TOTAL SCORE: 6
7. FEELING AFRAID AS IF SOMETHING AWFUL MIGHT HAPPEN: NOT AT ALL

## 2017-11-27 NOTE — PROGRESS NOTES
SUBJECTIVE:   Tari Wiley is a 72 year old female who presents to clinic today for the following health issues:      Depression and Anxiety Follow-Up    Status since last visit: Worsened she is more anxious and more depressed     Cries often and is not wanting to get out of bed    Has no appetite and does not eat well     Is very sad she is not able to do what she wants due to COPD    She is dependent on so many family members and feels guilty about this     Other associated symptoms:None    Complicating factors:     Significant life event: Yes-  Son in law passed away at [facility]     Current substance abuse: None    PHQ-9 Score and MyChart F/U Questions 2/16/2016 3/16/2017 11/27/2017   Total Score 5 3 5   Q9: Suicide Ideation Not at all Not at all Not at all     ROE-7 SCORE 2/16/2016 3/16/2017 11/27/2017   Total Score - - -   Total Score 0 5 6     In the past two weeks have you had thoughts of suicide or self-harm?  No.    Do you have concerns about your personal safety or the safety of others?   No    PHQ-9  English  PHQ-9   Any Language  GAD7  Suicide Assessment Five-step Evaluation and Treatment (SAFE-T)      COPD Follow-Up    Symptoms are currently: worse with exertion    Current fatigue or dyspnea with ambulation: none    Shortness of breath: stable    Increased or change in Cough/Sputum: Yes-      Fever(s): No    Baseline ambulation without stopping to rest:  1 blocks. Able to walk up unknown flights of stairs without stopping to rest.    Any ER/UC or hospital admissions since your last visit? No     History   Smoking Status     Former Smoker     Packs/day: 4.00     Years: 36.00     Types: Cigarettes     Quit date: 12/16/1993   Smokeless Tobacco     Never Used     Comment: Started at age 12     No results found for: FEV1, PMD9WAE  She has some GERD sxs and nausea and so does not eat much due to this is generally weak and has no energy she has lost 3 lbs   She is worried about getting to heavy and  this making it hearder for her to breath          Amount of exercise or physical activity: None    Problems taking medications regularly: Yes,  Difficulty swallowing pills, chokes on them    Medication side effects: none  Diet: regular (no restrictions)          Problem list and histories reviewed & adjusted, as indicated.  Additional history: as documented    Labs reviewed in EPIC    Reviewed and updated as needed this visit by clinical staffTobacco  Allergies  Meds  Problems  Med Hx  Surg Hx  Fam Hx  Soc Hx        Reviewed and updated as needed this visit by Provider  Allergies  Meds  Problems         ROS:  Constitutional, HEENT, cardiovascular, pulmonary, gi and gu systems are negative, except as otherwise noted.      OBJECTIVE:                                                    /76 (BP Location: Right arm, Patient Position: Chair, Cuff Size: Adult Regular)  Pulse 118  Temp 98.5  F (36.9  C) (Tympanic)  Wt 98 lb 6.4 oz (44.6 kg)  SpO2 90%  BMI 19.22 kg/m2  Body mass index is 19.22 kg/(m^2).  GENERAL APPEARANCE: healthy, alert and no distress  RESP: lungs clear to auscultation - no rales, rhonchi or wheezes very distant breath sounds   CV: regular rates and rhythm, normal S1 S2, no S3 or S4 and no murmur, click or rub  ABDOMEN: soft, nontender, without hepatosplenomegaly or masses and bowel sounds normal  MS: extremities normal- no gross deformities noted  PSYCH: mentation appears normal, anxious, crying and worried         ASSESSMENT/PLAN:                                                    1. Chronic obstructive pulmonary disease, unspecified COPD type (H)  Stable no change in treatment plan.   - ADVAIR DISKUS 500-50 MCG/DOSE diskus inhaler; INHALE ONE DOSE BY MOUTH EVERY 12 HOURS  Dispense: 3 Inhaler; Refill: 3  - albuterol (2.5 MG/3ML) 0.083% neb solution; Take 1 vial (2.5 mg) by nebulization every 4 hours as needed for shortness of breath / dyspnea  Dispense: 1 Box; Refill: 5  - albuterol  (PROAIR HFA/PROVENTIL HFA/VENTOLIN HFA) 108 (90 BASE) MCG/ACT Inhaler; Inhale 2 puffs into the lungs every 6 hours  Dispense: 4 Inhaler; Refill: 3  - SPIRIVA HANDIHALER 18 MCG capsule; INHALE ONE DOSE BY MOUTH ONCE DAILY  Dispense: 90 capsule; Refill: 3    2. Major depressive disorder, single episode, mild (H)  Worse   - buPROPion (WELLBUTRIN) 75 MG tablet; TAKE TWO TABLETS BY MOUTH TWICE DAILY  Dispense: 360 tablet; Refill: 3  - sertraline (ZOLOFT) 50 MG tablet; TAKE TWO TABLETS BY MOUTH ONCE DAILY  Dispense: 180 tablet; Refill: 3    3. Anxiety  Worse   - LORazepam (ATIVAN) 0.5 MG tablet; Take 1 tablet (0.5 mg) by mouth every 6 hours  Dispense: 20 tablet; Refill: 5    4. Generalized weakness  Due to poor nutrition     5. Gastroesophageal reflux disease without esophagitis    - sucralfate (CARAFATE) 1 GM tablet; Take 1 tablet (1 g) by mouth 4 times daily  Dispense: 120 tablet; Refill: 1    6. Need for prophylactic vaccination and inoculation against influenza    - FLU VACCINE, INCREASED ANTIGEN, PRESV FREE, AGE 65+ [34506]  - ADMIN INFLUENZA (For MEDICARE Patients ONLY) []      Patient Instructions   Medications are refilled     Ativan as needed for anxiety and SOA     Eat more protein as discussed     See me back in the spring     Pt will call with update in one month and will adjust meds if needed     Risks, benefits, side effects and rationale for treatment plan fully discussed with the patient and understanding expressed.     Suzy Peters MD  [facility]  '  Injectable Influenza Immunization Documentation    1.  Is the person to be vaccinated sick today?   No    2. Does the person to be vaccinated have an allergy to a component   of the vaccine?   No  Egg Allergy Algorithm Link    3. Has the person to be vaccinated ever had a serious reaction   to influenza vaccine in the past?   No    4. Has the person to be vaccinated ever had Guillain-Barré syndrome?   No    Form completed by  patient

## 2017-11-27 NOTE — MR AVS SNAPSHOT
"              After Visit Summary   11/27/2017    Tari Wiley    MRN: [id number]           Patient Information     Date Of Birth          1945        Visit Information        Provider Department      11/27/2017 11:20 AM Suzy Peters MD [facility]        Today's Diagnoses     Gastroesophageal reflux disease without esophagitis    -  1    Chronic obstructive pulmonary disease, unspecified COPD type (H)        Major depressive disorder, single episode, mild (H)        Anxiety          Care Instructions    Medications are refilled     Ativan as needed for anxiety and SOA     Eat more protein as discussed     See me back in the spring           Follow-ups after your visit        Who to contact     If you have questions or need follow up information about today's clinic visit or your schedule please contact [facility] directly at [phone].  Normal or non-critical lab and imaging results will be communicated to you by RVR Systemshart, letter or phone within 4 business days after the clinic has received the results. If you do not hear from us within 7 days, please contact the clinic through RVR Systemshart or phone. If you have a critical or abnormal lab result, we will notify you by phone as soon as possible.  Submit refill requests through Attender or call your pharmacy and they will forward the refill request to us. Please allow 3 business days for your refill to be completed.          Additional Information About Your Visit        RVR Systemshart Information     Attender lets you send messages to your doctor, view your test results, renew your prescriptions, schedule appointments and more. To sign up, go to [email or web address] . Click on \"Log in\" on the left side of the screen, which will take you to the Welcome page. Then click on \"Sign up Now\" on the right side of the page.     You will be asked to enter the access code listed below, as well as some personal information. " Please follow the directions to create your username and password.     Your access code is: [email or web address]  Expires: 2018 12:05 PM     Your access code will  in 90 days. If you need help or a new code, please call your [facility] clinic or [phone].        Care EveryWhere ID     This is your Care EveryWhere ID. This could be used by other organizations to access your [facility] medical records  [id number]        Your Vitals Were     Pulse Temperature Pulse Oximetry BMI (Body Mass Index)          118 98.5  F (36.9  C) (Tympanic) 90% 19.22 kg/m2         Blood Pressure from Last 3 Encounters:   17 118/76   17 130/74   16 132/75    Weight from Last 3 Encounters:   17 98 lb 6.4 oz (44.6 kg)   17 101 lb (45.8 kg)   16 101 lb 3.2 oz (45.9 kg)              We Performed the Following     DEPRESSION ACTION PLAN (DAP)          Today's Medication Changes          These changes are accurate as of: 17 12:05 PM.  If you have any questions, ask your nurse or doctor.               Start taking these medicines.        Dose/Directions    LORazepam 0.5 MG tablet   Commonly known as:  ATIVAN   Used for:  Anxiety   Started by:  Suzy Peters MD        Dose:  0.5 mg   Take 1 tablet (0.5 mg) by mouth every 6 hours   Quantity:  20 tablet   Refills:  5       sucralfate 1 GM tablet   Commonly known as:  CARAFATE   Used for:  Gastroesophageal reflux disease without esophagitis   Started by:  Suzy Peters MD        Dose:  1 g   Take 1 tablet (1 g) by mouth 4 times daily   Quantity:  120 tablet   Refills:  1         These medicines have changed or have updated prescriptions.        Dose/Directions    buPROPion 75 MG tablet   Commonly known as:  WELLBUTRIN   This may have changed:  See the new instructions.   Used for:  Major depressive disorder, single episode, mild (H)   Changed by:  Suzy Peters MD        TAKE TWO TABLETS BY MOUTH TWICE DAILY   Quantity:  360  tablet   Refills:  3       sertraline 50 MG tablet   Commonly known as:  ZOLOFT   This may have changed:  See the new instructions.   Used for:  Major depressive disorder, single episode, mild (H)   Changed by:  Suzy Peters MD        TAKE TWO TABLETS BY MOUTH ONCE DAILY   Quantity:  180 tablet   Refills:  3            Where to get your medicines      These medications were sent to [facility] [address] [place], [place] - [address]   [address] [facility] [address]     Phone:  [phone]     ADVAIR DISKUS 500-50 MCG/DOSE diskus inhaler    albuterol (2.5 MG/3ML) 0.083% neb solution    albuterol 108 (90 BASE) MCG/ACT Inhaler    buPROPion 75 MG tablet    sertraline 50 MG tablet    SPIRIVA HANDIHALER 18 MCG capsule    sucralfate 1 GM tablet         Some of these will need a paper prescription and others can be bought over the counter.  Ask your nurse if you have questions.     Bring a paper prescription for each of these medications     LORazepam 0.5 MG tablet                Primary Care Provider Office Phone # Fax #    Suzy Peters [phone] [address] [address]        Equal Access to Services     SHIRA VELAZCO : Hadii aad ku hadasho Soomaali, waaxda luqadaha, qaybta kaalmada adeegyada, fatmata garza. So [facility] [phone].    ATENCIÓN: Si habla español, tiene a hoang disposición servicios gratuitos de asistencia lingüística. Llame al [phone].    We comply with applicable federal civil rights laws and [place] laws. We do not discriminate on the basis of race, color, national origin, age, disability, sex, sexual orientation, or gender identity.            Thank you!     Thank you for choosing [facility]  for your care. Our goal is always to provide you with excellent care. Hearing back from our patients is one way we can continue to improve our services. Please take a few minutes to complete the  written survey that you may receive in the mail after your visit with us. Thank you!             Your Updated Medication List - Protect others around you: Learn how to safely use, store and throw away your medicines at www.disposem9GAGeds.org.          This list is accurate as of: 11/27/17 12:05 PM.  Always use your most recent med list.                   Brand Name Dispense Instructions for use Diagnosis    ADVAIR DISKUS 500-50 MCG/DOSE diskus inhaler   Generic drug:  fluticasone-salmeterol     3 Inhaler    INHALE ONE DOSE BY MOUTH EVERY 12 HOURS    Chronic obstructive pulmonary disease, unspecified COPD type (H)       * albuterol (2.5 MG/3ML) 0.083% neb solution     1 Box    Take 1 vial (2.5 mg) by nebulization every 4 hours as needed for shortness of breath / dyspnea    Chronic obstructive pulmonary disease, unspecified COPD type (H)       * albuterol 108 (90 BASE) MCG/ACT Inhaler    PROAIR HFA/PROVENTIL HFA/VENTOLIN HFA    4 Inhaler    Inhale 2 puffs into the lungs every 6 hours    Chronic obstructive pulmonary disease, unspecified COPD type (H)       buPROPion 75 MG tablet    WELLBUTRIN    360 tablet    TAKE TWO TABLETS BY MOUTH TWICE DAILY    Major depressive disorder, single episode, mild (H)       diphenhydrAMINE-acetaminophen  MG tablet    TYLENOL PM     Take 1-2 tablets by mouth nightly as needed.        LORazepam 0.5 MG tablet    ATIVAN    20 tablet    Take 1 tablet (0.5 mg) by mouth every 6 hours    Anxiety       nystatin 835398 UNIT/ML suspension    MYCOSTATIN    180 mL    SWISH AND SPIT 5 ML BY MOUTH TWICE DAILY(BEFORE MEALS). IF THE THRUSH IS NOT IMPROVING GO TO FOUR TIMES A DAY FOR 7 DAYS    Thrush       OXYGEN-HELIUM IN      2-3 liters        sertraline 50 MG tablet    ZOLOFT    180 tablet    TAKE TWO TABLETS BY MOUTH ONCE DAILY    Major depressive disorder, single episode, mild (H)       SPIRIVA HANDIHALER 18 MCG capsule   Generic drug:  tiotropium     90 capsule    INHALE ONE DOSE BY MOUTH  ONCE DAILY    Chronic obstructive pulmonary disease, unspecified COPD type (H)       sucralfate 1 GM tablet    CARAFATE    120 tablet    Take 1 tablet (1 g) by mouth 4 times daily    Gastroesophageal reflux disease without esophagitis       TYLENOL 500 MG tablet   Generic drug:  acetaminophen      Take 2 tablets by mouth every 6 hours as needed.        * Notice:  This list has 2 medication(s) that are the same as other medications prescribed for you. Read the directions carefully, and ask your doctor or other care provider to review them with you.

## 2017-11-27 NOTE — NURSING NOTE
Chief Complaint   Patient presents with     COPD       Initial /76 (BP Location: Right arm, Patient Position: Chair, Cuff Size: Adult Regular)  Pulse 118  Temp 98.5  F (36.9  C) (Tympanic)  Wt 98 lb 6.4 oz (44.6 kg)  SpO2 90%  BMI 19.22 kg/m2 Estimated body mass index is 19.22 kg/(m^2) as calculated from the following:    Height as of 3/16/17: 5' (1.524 m).    Weight as of this encounter: 98 lb 6.4 oz (44.6 kg).  Medication Reconciliation: complete    Health Maintenance that is potentially due pending provider review:  Mammogram    Pt declines to have mammograms.    Is there anyone who you would like to be able to receive your results? Yes  If yes have patient fill out STAR

## 2017-11-27 NOTE — PATIENT INSTRUCTIONS
Medications are refilled     Ativan as needed for anxiety and SOA     Eat more protein as discussed     See me back in the spring

## 2017-11-28 DIAGNOSIS — B37.0 THRUSH: ICD-10-CM

## 2017-11-28 ASSESSMENT — ANXIETY QUESTIONNAIRES: GAD7 TOTAL SCORE: 6

## 2017-11-29 RX ORDER — NYSTATIN 100000/ML
SUSPENSION, ORAL (FINAL DOSE FORM) ORAL
Qty: 180 ML | Refills: 1 | Status: SHIPPED | OUTPATIENT
Start: 2017-11-29 | End: 2018-06-27

## 2017-11-29 NOTE — TELEPHONE ENCOUNTER
nystatin (MYCOSTATIN) [id number] UNIT/ML suspension      Last Written Prescription Date: 4/7/17  Last Fill Quantity: 180 ml,  # refills: 0   Last Office Visit with [facility], [facility] or [facility] prescribing provider: 11/27/17

## 2018-01-02 ENCOUNTER — TELEPHONE (OUTPATIENT)
Dept: FAMILY MEDICINE | Facility: CLINIC | Age: 73
End: 2018-01-02

## 2018-01-02 DIAGNOSIS — J44.1 COPD EXACERBATION (H): ICD-10-CM

## 2018-01-02 RX ORDER — PREDNISONE 20 MG/1
40 TABLET ORAL DAILY
Qty: 10 TABLET | Refills: 0 | Status: SHIPPED | OUTPATIENT
Start: 2018-01-02 | End: 2018-03-19

## 2018-01-02 RX ORDER — AZITHROMYCIN 250 MG/1
TABLET, FILM COATED ORAL
Qty: 6 TABLET | Refills: 0 | Status: SHIPPED | OUTPATIENT
Start: 2018-01-02 | End: 2018-03-19

## 2018-01-02 NOTE — TELEPHONE ENCOUNTER
Reason for Call:  Other     Detailed comments: Patient has COPD and was told to call for meds as soon as she gets a cold    Phone Number Patient can be reached at: Home number on file [phone] (home)    Best Time:     Can we leave a detailed message on this number? YES    Call taken on 1/2/2018 at 10:50 AM by Luciana Santiago

## 2018-02-20 ENCOUNTER — TELEPHONE (OUTPATIENT)
Dept: FAMILY MEDICINE | Facility: CLINIC | Age: 73
End: 2018-02-20

## 2018-02-20 DIAGNOSIS — J44.9 CHRONIC OBSTRUCTIVE PULMONARY DISEASE, UNSPECIFIED COPD TYPE (H): ICD-10-CM

## 2018-02-20 RX ORDER — ALBUTEROL SULFATE 90 UG/1
2 AEROSOL, METERED RESPIRATORY (INHALATION) EVERY 6 HOURS
Qty: 4 INHALER | Refills: 3 | Status: SHIPPED | OUTPATIENT
Start: 2018-02-20 | End: 2019-03-06

## 2018-02-20 RX ORDER — TIOTROPIUM BROMIDE 18 UG/1
CAPSULE ORAL; RESPIRATORY (INHALATION)
Qty: 90 CAPSULE | Refills: 3 | Status: SHIPPED | OUTPATIENT
Start: 2018-02-20 | End: 2019-03-28

## 2018-02-20 NOTE — TELEPHONE ENCOUNTER
I am in process of applying to the Advair diskus, Ventolin HFA & Spiriva Handihaler assistance programs.  [employer] & [employer] require a hand signed, brand name, hard copy script be submitted with their applications.    Please hand sign a BRAND name, hard copy scripts for: ADVAIR DISKUS   VENTOLIN HFA   SPIRIVA HANDIHALER  Please write scripts for daily dose, 90 day supply with 3 refills.    Please send the scripts to me via interoffice mail [facility] [place], to Noy Pickens or via US mail  at:   [facility]   Noy Pickens   [address] [place]  [address]    Thanks so much for your help!    Noy Pickens  Prescription

## 2018-03-01 ENCOUNTER — TELEPHONE (OUTPATIENT)
Dept: FAMILY MEDICINE | Facility: CLINIC | Age: 73
End: 2018-03-01

## 2018-03-01 NOTE — TELEPHONE ENCOUNTER
ANKUSH Moulton has been approved to the [employer] assistance program for Advair Diskus & Ventolin HFA until December 2018  She will receive this medication at no cost for the enrollment period.    A 90 day supply of ADVAIR DISKUS & VENTOLIN HFA will be delivered to Saige's home within 7-10 business days.    Saige will contact my office for refills as we must work directly with the .  I will note EPIC as each refill is requested.    Thanks so much for your help!    Noy Pickens  Prescription

## 2018-03-15 ENCOUNTER — TELEPHONE (OUTPATIENT)
Dept: FAMILY MEDICINE | Facility: CLINIC | Age: 73
End: 2018-03-15

## 2018-03-15 NOTE — TELEPHONE ENCOUNTER
ANKUSH Moulton has been approved to the [employer] assistance program for Spiriva handihaler, until December 2018.  She will receive this medication at no cost for the enrollment period.    A 90 day supply of SPIRIVA HANDIHALER will be delivered to Saige's home within 7-10 business days.    Saige will contact my office for refills as we must work directly with the .  I will note EPIC as each refill is requested.    Thanks so much for your help!    Noy Pickens  Prescription

## 2018-03-19 ENCOUNTER — TELEPHONE (OUTPATIENT)
Dept: FAMILY MEDICINE | Facility: CLINIC | Age: 73
End: 2018-03-19

## 2018-03-19 DIAGNOSIS — J44.1 COPD EXACERBATION (H): ICD-10-CM

## 2018-03-19 RX ORDER — PREDNISONE 5 MG/1
40 TABLET ORAL DAILY
Qty: 40 TABLET | Refills: 0 | Status: SHIPPED | OUTPATIENT
Start: 2018-03-19 | End: 2019-04-11

## 2018-03-19 RX ORDER — AZITHROMYCIN 250 MG/1
TABLET, FILM COATED ORAL
Qty: 6 TABLET | Refills: 0 | Status: SHIPPED | OUTPATIENT
Start: 2018-03-19 | End: 2018-10-23

## 2018-03-19 NOTE — TELEPHONE ENCOUNTER
"Reason for call:  Patient reporting a symptom    Symptom or request: Saige says she's getting \"the crud\" back again. States her chest hurts and back and into the lungs. She is coughing green junk. She is wondering if she can get Rx.  She says if they do prescribe Prednisone to use the 5 mg tabs because she can't swallow the larger ones.     Duration (how long have symptoms been present): She has been tired for a week but the current symptoms started on Saturday, 3/17. She says she has not been out of the house since Christmas.     Have you been treated for this before? Yes    Additional comments: [facility]    Phone Number patient can be reached at:  Home number on file [phone] (home)    Best Time:  anytime    Can we leave a detailed message on this number:  YES    Call taken on 3/19/2018 at 12:56 PM by Patrica King    "

## 2018-05-26 DIAGNOSIS — F41.9 ANXIETY: ICD-10-CM

## 2018-05-29 RX ORDER — LORAZEPAM 0.5 MG/1
TABLET ORAL
Qty: 20 TABLET | Refills: 5 | Status: SHIPPED | OUTPATIENT
Start: 2018-05-29 | End: 2018-11-23

## 2018-05-29 NOTE — TELEPHONE ENCOUNTER
Controlled Substance Refill Request for LORazepam (ATIVAN) 0.5 MG tablet  Problem List Complete:  Yes, Anxiety [F41.9]    checked in past 6 months?  No, route to RN     Last Written Prescription Date:  11/27/17  Last Fill Quantity: 20,  # refills: 5   Last office visit: 11/27/2017 with prescribing provider:     Future Office Visit:

## 2018-06-27 DIAGNOSIS — B37.0 THRUSH: ICD-10-CM

## 2018-06-27 RX ORDER — NYSTATIN 100000/ML
SUSPENSION, ORAL (FINAL DOSE FORM) ORAL
Qty: 180 ML | Refills: 0 | Status: SHIPPED | OUTPATIENT
Start: 2018-06-27 | End: 2018-08-01

## 2018-07-03 ENCOUNTER — TELEPHONE (OUTPATIENT)
Dept: FAMILY MEDICINE | Facility: CLINIC | Age: 73
End: 2018-07-03

## 2018-07-03 NOTE — TELEPHONE ENCOUNTER
FYI~ A refill has been made to the  assistance programs for Advair Diskus, Ventolin HFA, and Spiriva Handihaler.     A 90 day supply of ADVAIR DISKUS, VENTOLIN HFA, AND SPIRIVA HANDIHALER was delivered to Virginia's home.    Thank you,    Cheryl Pierre  Rx Assistance

## 2018-08-01 DIAGNOSIS — B37.0 THRUSH: ICD-10-CM

## 2018-08-01 NOTE — TELEPHONE ENCOUNTER
nystatin (MYCOSTATIN) 209307 UNIT/ML suspension      Last Written Prescription Date:  6/27/18  Last Fill Quantity: 180,   # refills: 0  Last Office Visit: 11/27/17  Future Office visit:       Routing refill request to provider for review/approval because:  Drug not on the [facility], [facility] or [facility] refill protocol or controlled substance

## 2018-08-02 NOTE — TELEPHONE ENCOUNTER
Pt called. States since she switched her inhaler she is having trouble with thrush. States she would like refill. Also advised she is due for an appointment. Declined offer to assist in setting up appointment, states she will call after she figures out a ride.  Kate Willard RN

## 2018-08-03 RX ORDER — NYSTATIN 100000/ML
SUSPENSION, ORAL (FINAL DOSE FORM) ORAL
Qty: 60 ML | Refills: 3 | Status: SHIPPED | OUTPATIENT
Start: 2018-08-03 | End: 2019-04-11

## 2018-10-02 ENCOUNTER — TELEPHONE (OUTPATIENT)
Dept: FAMILY MEDICINE | Facility: CLINIC | Age: 73
End: 2018-10-02

## 2018-10-02 NOTE — TELEPHONE ENCOUNTER
FYI~ A refill has been made to the  assistance program for Advair Diskus and Spiriva.    A 90 day supply of ADVAIR DISKUS AND SPIRIVA will be delivered to Virginia's home within 7-10 business days.    Thank you,    Cheryl Pierre  Prescription Assistance

## 2018-10-23 ENCOUNTER — TELEPHONE (OUTPATIENT)
Dept: FAMILY MEDICINE | Facility: CLINIC | Age: 73
End: 2018-10-23

## 2018-10-23 DIAGNOSIS — J44.1 COPD EXACERBATION (H): ICD-10-CM

## 2018-10-23 RX ORDER — AZITHROMYCIN 200 MG/5ML
POWDER, FOR SUSPENSION ORAL
Qty: 42.5 ML | Refills: 0 | Status: SHIPPED | OUTPATIENT
Start: 2018-10-23 | End: 2018-12-28

## 2018-10-23 NOTE — TELEPHONE ENCOUNTER
Reason for call:  Patient reporting a symptom    Symptom or request: Saige says she has a cold. States she is coughing up green. No temp but says she never gets a temp with this. She has Prednisone at home but hasn't started it yet. She says if Dr Peters orders an antibiotic, she needs something that will crush because she can't swallow pills.     Duration (how long have symptoms been present): This started last Friday 10/19.    Have you been treated for this before? Yes    Additional comments:  [facility]    Phone Number patient can be reached at:  Home number on file [phone] (home)    Best Time:  anytime    Can we leave a detailed message on this number:  YES    Call taken on 10/23/2018 at 1:24 PM by Patrica King

## 2018-11-13 ENCOUNTER — ALLIED HEALTH/NURSE VISIT (OUTPATIENT)
Dept: FAMILY MEDICINE | Facility: CLINIC | Age: 73
End: 2018-11-13
Payer: MEDICARE

## 2018-11-13 DIAGNOSIS — Z23 NEED FOR PROPHYLACTIC VACCINATION AND INOCULATION AGAINST INFLUENZA: Primary | ICD-10-CM

## 2018-11-13 PROCEDURE — 90662 IIV NO PRSV INCREASED AG IM: CPT

## 2018-11-13 PROCEDURE — G0008 ADMIN INFLUENZA VIRUS VAC: HCPCS

## 2018-11-13 NOTE — MR AVS SNAPSHOT
After Visit Summary   11/13/2018    Tari Wiley    MRN: [id number]           Patient Information     Date Of Birth          1945        Visit Information        Provider Department      11/13/2018 3:15 PM [place] NB BEATRIZ/LPN [facility]        Today's Diagnoses     Need for prophylactic vaccination and inoculation against influenza    -  1       Follow-ups after your visit        Who to contact     If you have questions or need follow up information about today's clinic visit or your schedule please contact [facility] directly at [phone].  Normal or non-critical lab and imaging results will be communicated to you by MyChart, letter or phone within 4 business days after the clinic has received the results. If you do not hear from us within 7 days, please contact the clinic through MyChart or phone. If you have a critical or abnormal lab result, we will notify you by phone as soon as possible.  Submit refill requests through [employer] or call your pharmacy and they will forward the refill request to us. Please allow 3 business days for your refill to be completed.          Additional Information About Your Visit        Care EveryWhere ID     This is your Care EveryWhere ID. This could be used by other organizations to access your [facility] medical records  [id number]         Blood Pressure from Last 3 Encounters:   11/27/17 118/76   03/16/17 130/74   02/16/16 132/75    Weight from Last 3 Encounters:   11/27/17 98 lb 6.4 oz (44.6 kg)   03/16/17 101 lb (45.8 kg)   02/16/16 101 lb 3.2 oz (45.9 kg)              We Performed the Following     FLU VACCINE, INCREASED ANTIGEN, PRESV FREE, AGE 65+ [48655]     Vaccine Administration, Initial [58885]        Primary Care Provider Office Phone # Fax #    Suzy Peters [phone] [place] [address]        Equal Access to Services     SHIRA VELAZCO : Hardeep pinedo  Gallito, wajordinda luqadaha, qaomarta kanarciso adams, fatmata mcleancurtis greg. So [facility] [phone].    ATENCIÓN: Si juvenal arriola, tiene a hoang disposición servicios gratuitos de asistencia lingüística. Keshav al [phone].    We comply with applicable federal civil rights laws and [place] laws. We do not discriminate on the basis of race, color, national origin, age, disability, sex, sexual orientation, or gender identity.            Thank you!     Thank you for choosing [facility]  for your care. Our goal is always to provide you with excellent care. Hearing back from our patients is one way we can continue to improve our services. Please take a few minutes to complete the written survey that you may receive in the mail after your visit with us. Thank you!             Your Updated Medication List - Protect others around you: Learn how to safely use, store and throw away your medicines at www.disposemymeds.org.          This list is accurate as of 11/13/18  3:45 PM.  Always use your most recent med list.                   Brand Name Dispense Instructions for use Diagnosis    ADVAIR DISKUS 500-50 MCG/DOSE diskus inhaler   Generic drug:  fluticasone-salmeterol     3 Inhaler    INHALE ONE DOSE BY MOUTH EVERY 12 HOURS    Chronic obstructive pulmonary disease, unspecified COPD type (H)       * albuterol (2.5 MG/3ML) 0.083% neb solution     1 Box    Take 1 vial (2.5 mg) by nebulization every 4 hours as needed for shortness of breath / dyspnea    Chronic obstructive pulmonary disease, unspecified COPD type (H)       * albuterol 108 (90 Base) MCG/ACT inhaler    PROAIR HFA/PROVENTIL HFA/VENTOLIN HFA    4 Inhaler    Inhale 2 puffs into the lungs every 6 hours    Chronic obstructive pulmonary disease, unspecified COPD type (H)       azithromycin 200 MG/5ML suspension    ZITHROMAX    42.5 mL    500 mg day one then 250 mg day 2-5    COPD exacerbation (H)       buPROPion 75 MG tablet     WELLBUTRIN    360 tablet    TAKE TWO TABLETS BY MOUTH TWICE DAILY    Major depressive disorder, single episode, mild (H)       diphenhydrAMINE-acetaminophen  MG tablet    TYLENOL PM     Take 1-2 tablets by mouth nightly as needed.        LORazepam 0.5 MG tablet    ATIVAN    20 tablet    TAKE ONE TABLET BY MOUTH EVERY 6 HOURS    Anxiety       nystatin 306113 UNIT/ML suspension    MYCOSTATIN    60 mL    SWISH AND SPIT 5 ML BY MOUTH TWICE DAILY BEFORE MEAL(S) IF THRUSH NOT IMPROVING GO TO FOUR TIMES A DAY FOR 7 DAYS    Thrush       OXYGEN-HELIUM IN      2-3 liters        sertraline 50 MG tablet    ZOLOFT    180 tablet    TAKE TWO TABLETS BY MOUTH ONCE DAILY    Major depressive disorder, single episode, mild (H)       SPIRIVA HANDIHALER 18 MCG capsule   Generic drug:  tiotropium     90 capsule    INHALE ONE DOSE BY MOUTH ONCE DAILY    Chronic obstructive pulmonary disease, unspecified COPD type (H)       sucralfate 1 GM tablet    CARAFATE    120 tablet    Take 1 tablet (1 g) by mouth 4 times daily    Gastroesophageal reflux disease without esophagitis       TYLENOL 500 MG tablet   Generic drug:  acetaminophen      Take 2 tablets by mouth every 6 hours as needed.        * Notice:  This list has 2 medication(s) that are the same as other medications prescribed for you. Read the directions carefully, and ask your doctor or other care provider to review them with you.

## 2018-11-13 NOTE — PROGRESS NOTES

## 2018-11-23 DIAGNOSIS — F32.0 MAJOR DEPRESSIVE DISORDER, SINGLE EPISODE, MILD (H): ICD-10-CM

## 2018-11-23 DIAGNOSIS — F41.9 ANXIETY: ICD-10-CM

## 2018-11-23 NOTE — TELEPHONE ENCOUNTER
"Requested Prescriptions   Pending Prescriptions Disp Refills     sertraline (ZOLOFT) 50 MG tablet [Pharmacy Med Name: SERTRALINE 50MG TAB] 180 tablet 3     Sig: TAKE TWO TABLETS BY MOUTH ONCE DAILY    SSRIs Protocol Failed    11/23/2018 10:16 AM       Failed - PHQ-9 score less than 5 in past 6 months    Please review last PHQ-9 score.          Passed - Patient is age 18 or older       Passed - No active pregnancy on record       Passed - No positive pregnancy test in last 12 months       Passed - Recent (6 mo) or future (30 days) visit within the authorizing provider's specialty    Patient had office visit in the last 6 months or has a visit in the next 30 days with authorizing provider or within the authorizing provider's specialty.  See \"Patient Info\" tab in inbasket, or \"Choose Columns\" in Meds & Orders section of the refill encounter.            LORazepam (ATIVAN) 0.5 MG tablet [Pharmacy Med Name: LORAZEPAM 0.5MG     TAB] 20 tablet 5     Sig: TAKE 1 TABLET BY MOUTH EVERY 6 HOURS    There is no refill protocol information for this order      sertraline (ZOLOFT) 50 MG tablet  Last Written Prescription Date:  11/27/2017  Last Fill Quantity: 180 tablet,  # refills: 3   Last office visit: 11/13/2018 with prescribing provider:  11/27/2017 MARLENE Peters   Future Office Visit:      LORazepam (ATIVAN) 0.5 MG tablet  Last Written Prescription Date:  05/29/2018  Last Fill Quantity: 20 tablet,  # refills: 5   Last office visit: 11/13/2018 with prescribing provider:  11/27/2018MARLENE Peters   Future Office Visit:      PHQ-9 SCORE 2/16/2016 3/16/2017 11/27/2017   Total Score - - -   Total Score 5 3 5     ROE-7 SCORE 2/16/2016 3/16/2017 11/27/2017   Total Score - - -   Total Score 0 5 6       Kelley MIRELES (R) (M)      "

## 2018-11-25 RX ORDER — LORAZEPAM 0.5 MG/1
TABLET ORAL
Qty: 20 TABLET | Refills: 5 | Status: SHIPPED | OUTPATIENT
Start: 2018-11-25 | End: 2019-01-18

## 2018-11-26 NOTE — TELEPHONE ENCOUNTER
Faxed over script to pharm, called pt, original to folder.    Maria Luz Swain  [employer]

## 2018-12-18 ENCOUNTER — TELEPHONE (OUTPATIENT)
Dept: FAMILY MEDICINE | Facility: CLINIC | Age: 73
End: 2018-12-18

## 2018-12-27 DIAGNOSIS — F32.0 MAJOR DEPRESSIVE DISORDER, SINGLE EPISODE, MILD (H): ICD-10-CM

## 2018-12-27 NOTE — TELEPHONE ENCOUNTER
"Requested Prescriptions   Pending Prescriptions Disp Refills     buPROPion (WELLBUTRIN) 75 MG tablet [Pharmacy Med Name: BUPROPION 75MG TAB] 120 tablet 11     Sig: TAKE TWO TABLETS BY MOUTH TWICE DAILY    SSRIs Protocol Failed - 12/27/2018 12:26 PM       Failed - PHQ-9 score less than 5 in past 6 months    Please review last PHQ-9 score.     PHQ-9 SCORE 2/16/2016 3/16/2017 11/27/2017   PHQ-9 Total Score - - -   PHQ-9 Total Score 5 3 5     ROE-7 SCORE 2/16/2016 3/16/2017 11/27/2017   Total Score - - -   Total Score 0 5 6                Passed - Medication is Bupropion    If the medication is Bupropion (Wellbutrin), and the patient is taking for smoking cessation; OK to refill.         Passed - Patient is age 18 or older       Passed - No active pregnancy on record       Passed - No positive pregnancy test in last 12 months       Passed - Recent (6 mo) or future (30 days) visit within the authorizing provider's specialty    Patient had office visit in the last 6 months or has a visit in the next 30 days with authorizing provider or within the authorizing provider's specialty.  See \"Patient Info\" tab in inbasket, or \"Choose Columns\" in Meds & Orders section of the refill encounter.            Last Written Prescription Date:  11/27/17  Last Fill Quantity: 360,  # refills: 3   Last office visit: 11/13/2018 with prescribing provider:     Future Office Visit:      "

## 2018-12-28 ENCOUNTER — TELEPHONE (OUTPATIENT)
Dept: FAMILY MEDICINE | Facility: CLINIC | Age: 73
End: 2018-12-28

## 2018-12-28 DIAGNOSIS — J44.1 COPD EXACERBATION (H): ICD-10-CM

## 2018-12-28 RX ORDER — BUPROPION HYDROCHLORIDE 75 MG/1
TABLET ORAL
Qty: 120 TABLET | Refills: 0 | Status: SHIPPED | OUTPATIENT
Start: 2018-12-28 | End: 2019-01-18

## 2018-12-28 RX ORDER — AZITHROMYCIN 200 MG/5ML
POWDER, FOR SUSPENSION ORAL
Qty: 42.5 ML | Refills: 0 | Status: SHIPPED | OUTPATIENT
Start: 2018-12-28 | End: 2019-01-24

## 2018-12-28 NOTE — TELEPHONE ENCOUNTER
[facility],  Spoke to this patient who says she as a hard cough and today developed green secretions, says this has happened before and Dr. Peters ok'd Z-pack for her. Patient has history COPD. Denies fever, says she is wheezing, only short of breath when walking, patient is concerned. Uses inhalers and they are helpful. Says she wants the liquid form of antibiotic due to difficulty swallowing pills. Advised to the patient needs to be seen, declines appointment as can not drive and daughter who lives with her is in the cities and not sure when daughter will return due to snowy conditions. Have pharmacy pended, please see telephone encounter on 10-23-18 for similar respiratory concerns.    CECY Aponte

## 2018-12-28 NOTE — TELEPHONE ENCOUNTER
Azithromycin sent to the pharmacy for symptoms.  Needs to follow up with Dr. Peters has not been seen in over a year.  Follow up sooner with any worsening or ongoing symptoms.    MARYJANE Klein CNP

## 2018-12-28 NOTE — TELEPHONE ENCOUNTER
Reason for Call:  Other     Detailed comments: Patient has green slime when she coughs.  She has had this since before [place] - She will not see anyone but Dr. Peters - She would rather see a Vet.  She wants Meds for this cough without coming in -     Phone Number Patient can be reached at: Home number on file [phone] (home)    Best Time:     Can we leave a detailed message on this number? YES    Call taken on 12/28/2018 at 9:44 AM by Luciana Santiago

## 2019-01-18 ENCOUNTER — OFFICE VISIT (OUTPATIENT)
Dept: FAMILY MEDICINE | Facility: CLINIC | Age: 74
End: 2019-01-18
Payer: MEDICARE

## 2019-01-18 VITALS — DIASTOLIC BLOOD PRESSURE: 72 MMHG | SYSTOLIC BLOOD PRESSURE: 120 MMHG

## 2019-01-18 DIAGNOSIS — J44.9 CHRONIC OBSTRUCTIVE PULMONARY DISEASE, UNSPECIFIED COPD TYPE (H): Primary | ICD-10-CM

## 2019-01-18 DIAGNOSIS — F32.0 MAJOR DEPRESSIVE DISORDER, SINGLE EPISODE, MILD (H): ICD-10-CM

## 2019-01-18 DIAGNOSIS — K21.9 GASTROESOPHAGEAL REFLUX DISEASE, ESOPHAGITIS PRESENCE NOT SPECIFIED: ICD-10-CM

## 2019-01-18 DIAGNOSIS — F41.9 ANXIETY: ICD-10-CM

## 2019-01-18 PROCEDURE — 99214 OFFICE O/P EST MOD 30 MIN: CPT | Performed by: FAMILY MEDICINE

## 2019-01-18 RX ORDER — OMEPRAZOLE 40 MG/1
40 CAPSULE, DELAYED RELEASE ORAL DAILY
Qty: 90 CAPSULE | Refills: 3 | Status: ON HOLD | OUTPATIENT
Start: 2019-01-18 | End: 2019-03-01

## 2019-01-18 RX ORDER — BUPROPION HYDROCHLORIDE 75 MG/1
TABLET ORAL
Qty: 360 TABLET | Refills: 3 | Status: SHIPPED | OUTPATIENT
Start: 2019-01-18 | End: 2020-01-27

## 2019-01-18 RX ORDER — LORAZEPAM 0.5 MG/1
TABLET ORAL
Qty: 20 TABLET | Refills: 0 | Status: SHIPPED | OUTPATIENT
Start: 2019-01-18 | End: 2019-03-19

## 2019-01-18 ASSESSMENT — MIFFLIN-ST. JEOR: SCORE: 889.63

## 2019-01-18 ASSESSMENT — ANXIETY QUESTIONNAIRES
4. TROUBLE RELAXING: NOT AT ALL
1. FEELING NERVOUS, ANXIOUS, OR ON EDGE: SEVERAL DAYS
6. BECOMING EASILY ANNOYED OR IRRITABLE: SEVERAL DAYS
3. WORRYING TOO MUCH ABOUT DIFFERENT THINGS: MORE THAN HALF THE DAYS
5. BEING SO RESTLESS THAT IT IS HARD TO SIT STILL: NOT AT ALL
GAD7 TOTAL SCORE: 6
7. FEELING AFRAID AS IF SOMETHING AWFUL MIGHT HAPPEN: NOT AT ALL
GAD7 TOTAL SCORE: 6
7. FEELING AFRAID AS IF SOMETHING AWFUL MIGHT HAPPEN: NOT AT ALL
2. NOT BEING ABLE TO STOP OR CONTROL WORRYING: MORE THAN HALF THE DAYS
GAD7 TOTAL SCORE: 6

## 2019-01-18 ASSESSMENT — PATIENT HEALTH QUESTIONNAIRE - PHQ9
SUM OF ALL RESPONSES TO PHQ QUESTIONS 1-9: 7
SUM OF ALL RESPONSES TO PHQ QUESTIONS 1-9: 7
10. IF YOU CHECKED OFF ANY PROBLEMS, HOW DIFFICULT HAVE THESE PROBLEMS MADE IT FOR YOU TO DO YOUR WORK, TAKE CARE OF THINGS AT HOME, OR GET ALONG WITH OTHER PEOPLE: NOT DIFFICULT AT ALL

## 2019-01-18 NOTE — NURSING NOTE
Chief Complaint   Patient presents with     COPD     Med refills     Depression     Med refills     Throat Problem     painful swallowing x 1 month       Initial /72 (BP Location: Right arm, Patient Position: Chair, Cuff Size: Adult Regular)   Pulse (P) 118   Temp (P) 98.6  F (37  C) (Tympanic)   Resp (P) 20   Ht (P) 1.524 m (5')   Wt (P) 45.8 kg (101 lb)   SpO2 (P) 92%   BMI (P) 19.73 kg/m   Estimated body mass index is 19.73 kg/m  (pended) as calculated from the following:    Height as of this encounter: (P) 1.524 m (5').    Weight as of this encounter: (P) 45.8 kg (101 lb).    Patient presents to the clinic using Oxygen    Health Maintenance that is potentially due pending provider review:  Mammogram    Pt declines to have mammo.    Is there anyone who you would like to be able to receive your results? No  If yes have patient fill out STAR

## 2019-01-18 NOTE — PATIENT INSTRUCTIONS
Set up endoscopy     Start omeprazole 40 mg daily     Stay on all your inhalers for now     Based on the above results will determine next steps

## 2019-01-18 NOTE — PROGRESS NOTES
SUBJECTIVE:   Tari Wiley is a 73 year old female who presents to clinic today for the following health issues:      Depression and Anxiety Follow-Up    Status since last visit: Worsened      starte    Other associated symptoms:None    Complicating factors: Holidays have made anxiety worse and her daughter moving in has been very stressful     She is crying more and feels like she is more stressed not over her breathing but she is over the family issues     Significant life event: No     Current substance abuse: None    PHQ 3/16/2017 11/27/2017 1/18/2019   PHQ-9 Total Score 3 5 7   Q9: Suicide Ideation Not at all Not at all Not at all     ROE-7 SCORE 3/16/2017 11/27/2017 1/18/2019   Total Score - - -   Total Score - - 6 (mild anxiety)   Total Score 5 6 6     In the past two weeks have you had thoughts of suicide or self-harm?  No.    Do you have concerns about your personal safety or the safety of others?   No  PHQ-9  English  PHQ-9   Any Language  ROE-7  Suicide Assessment Five-step Evaluation and Treatment (SAFE-T)  COPD Follow-Up    Symptoms are currently: stable    Current fatigue or dyspnea with ambulation: worsened from baseline- worsens with increased depression and anxiety    Shortness of breath: slightly worsened    Increased or change in Cough/Sputum: No    Fever(s): No    Baseline ambulation without stopping to rest:  3-4 rooms. Able to walk up 1 flights of stairs without stopping to rest.    Any ER/UC or hospital admissions since your last visit? No     History   Smoking Status     Former Smoker     Packs/day: 4.00     Years: 36.00     Types: Cigarettes     Quit date: 12/16/1993   Smokeless Tobacco     Never Used     Comment: Started at age 12     Throat Problem - GERD worse       Duration: 1 months    Description (location/character/radiation): Trouble swallowing food, choking every meal    Intensity:  mild    Accompanying signs and symptoms: Painful when swallowing    History (similar  episodes/previous evaluation): None    Precipitating or alleviating factors: None    Therapies tried and outcome: None she has tried carafate with minimal help     She has burning in her throat all the time some epigastric pain and for sure heartburn that is worse   No stool changes  She states nothing really makes it better or wrse she is still eating           No results found for: FEV1, GTD4JNA    Amount of exercise or physical activity: None    Problems taking medications regularly: Yes,  Has to crush medications to swallow    Medication side effects: none    Diet: regular (no restrictions)            Problem list and histories reviewed & adjusted, as indicated.  Additional history: as documented    Labs reviewed in EPIC    Reviewed and updated as needed this visit by clinical staff  Tobacco  Allergies  Meds  Problems  Med Hx  Surg Hx  Fam Hx  Soc Hx        Reviewed and updated as needed this visit by Provider  Tobacco  Allergies  Meds  Problems  Med Hx  Surg Hx  Fam Hx         ROS:  Constitutional, HEENT, cardiovascular, pulmonary, gi and gu systems are negative, except as otherwise noted.    OBJECTIVE:                                                    /72 (BP Location: Right arm, Patient Position: Chair, Cuff Size: Adult Regular)   Pulse (P) 118   Temp (P) 98.6  F (37  C) (Tympanic)   Resp (P) 20   Ht (P) 1.524 m (5')   Wt (P) 45.8 kg (101 lb)   SpO2 (P) 92%   BMI (P) 19.73 kg/m     Body mass index is 19.73 kg/m  (pended).  GENERAL APPEARANCE: healthy, alert and no distress  NECK: no adenopathy, no asymmetry, masses, or scars and thyroid normal to palpation  RESP: lungs clear to auscultation - no rales, rhonchi or wheezes  CV: regular rates and rhythm, normal S1 S2, no S3 or S4 and no murmur, click or rub  ABDOMEN: soft, nontender, without hepatosplenomegaly or masses and bowel sounds normal  MS: extremities normal- no gross deformities noted  PSYCH: mentation appears normal and  affect normal gets tearful at times          ASSESSMENT/PLAN:                                                    1. Chronic obstructive pulmonary disease, unspecified COPD type (H)  Stable no change in treatment plan.     2. Major depressive disorder, single episode, mild (H)  Worse   - buPROPion (WELLBUTRIN) 75 MG tablet; TAKE TWO TABLETS BY MOUTH TWICE DAILY  Dispense: 360 tablet; Refill: 3  - sertraline (ZOLOFT) 50 MG tablet; TAKE TWO TABLETS BY MOUTH ONCE DAILY  Dispense: 180 tablet; Refill: 3    3. Anxiety  Stable no change in treatment plan.   - LORazepam (ATIVAN) 0.5 MG tablet; TAKE 1 TABLET BY MOUTH EVERY 6 HOURS  Dispense: 20 tablet; Refill: 0    4. Gastroesophageal reflux disease, esophagitis presence not specified  Not well controlled   - GASTROENTEROLOGY ADULT REF PROCEDURE ONLY  - omeprazole (PRILOSEC) 40 MG DR capsule; Take 1 capsule (40 mg) by mouth daily  Dispense: 90 capsule; Refill: 3      Patient Instructions   Set up endoscopy     Start omeprazole 40 mg daily     Stay on all your inhalers for now     Based on the above results will determine next steps      Suzy Peters MD  [facility]

## 2019-01-19 DIAGNOSIS — J44.9 CHRONIC OBSTRUCTIVE PULMONARY DISEASE, UNSPECIFIED COPD TYPE (H): ICD-10-CM

## 2019-01-19 ASSESSMENT — ANXIETY QUESTIONNAIRES: GAD7 TOTAL SCORE: 6

## 2019-01-19 ASSESSMENT — PATIENT HEALTH QUESTIONNAIRE - PHQ9: SUM OF ALL RESPONSES TO PHQ QUESTIONS 1-9: 7

## 2019-01-21 RX ORDER — TIOTROPIUM BROMIDE 18 UG/1
CAPSULE ORAL; RESPIRATORY (INHALATION)
Qty: 90 CAPSULE | Refills: 3 | Status: SHIPPED | OUTPATIENT
Start: 2019-01-21 | End: 2019-01-24

## 2019-01-21 NOTE — TELEPHONE ENCOUNTER
"Requested Prescriptions   Pending Prescriptions Disp Refills     ADVAIR DISKUS 500-50 MCG/DOSE inhaler [Pharmacy Med Name: ADVAIR DISKU 500/50 MIS]  3     Sig: INHALE ONE DOSE BY MOUTH EVERY 12 HOURS    Inhaled Steroids Protocol Passed - 1/19/2019  6:14 PM       Passed - Patient is age 12 or older       Passed - Recent (12 mo) or future (30 days) visit within the authorizing provider's specialty    Patient had office visit in the last 12 months or has a visit in the next 30 days with authorizing provider or within the authorizing provider's specialty.  See \"Patient Info\" tab in inbasket, or \"Choose Columns\" in Meds & Orders section of the refill encounter.      Last Written Prescription Date:  2/20/18  Last Fill Quantity: 3,  # refills: 3   Last office visit: 1/18/2019 with prescribing provider:     Future Office Visit:               Passed - Medication is active on med list        SPIRIVA HANDIHALER 18 MCG inhaled capsule [Pharmacy Med Name: SPIRIVA HANDIHLR 18MCG CAP] 90 capsule 3     Sig: INHALE ONE CAPSULE BY MOUTH ONCE DAILY    Inhaled Steroids Protocol Passed - 1/19/2019  6:14 PM       Passed - Patient is age 12 or older       Passed - Recent (12 mo) or future (30 days) visit within the authorizing provider's specialty    Patient had office visit in the last 12 months or has a visit in the next 30 days with authorizing provider or within the authorizing provider's specialty.  See \"Patient Info\" tab in inbasket, or \"Choose Columns\" in Meds & Orders section of the refill encounter.      Last Written Prescription Date:  2/20/18  Last Fill Quantity: 90,  # refills: 3   Last office visit: 1/18/2019 with prescribing provider:     Future Office Visit:               Passed - Medication is active on med list        ]  "

## 2019-01-25 ENCOUNTER — ANESTHESIA EVENT (OUTPATIENT)
Dept: GASTROENTEROLOGY | Facility: CLINIC | Age: 74
End: 2019-01-25
Payer: MEDICARE

## 2019-01-25 ASSESSMENT — LIFESTYLE VARIABLES: TOBACCO_USE: 1

## 2019-01-25 ASSESSMENT — COPD QUESTIONNAIRES: COPD: 1

## 2019-01-25 NOTE — ANESTHESIA PREPROCEDURE EVALUATION
Anesthesia Pre-Procedure Evaluation    Patient: Tari Wiley   MRN: [id number] : 1945          Preoperative Diagnosis: gastroesophageal reflux disease esophagitis presence not specified    diagnostic    Procedure(s):  COMBINED ESOPHAGOSCOPY, GASTROSCOPY, DUODENOSCOPY (EGD)    Past Medical History:   Diagnosis Date     ROE (generalised anxiety disorder)      Hyperlipidemia LDL goal < 130      Tobacco abuse      Past Surgical History:   Procedure Laterality Date     APPENDECTOMY       C/SECTION, LOW TRANSVERSE      , Low Transverse     CATARACT IOL, RT/LT      right-2013     CHOLECYSTECTOMY, OPEN       SURGICAL HISTORY OF -       stent to biliary tract       Anesthesia Evaluation     . Pt has had prior anesthetic.     No history of anesthetic complications          ROS/MED HX    ENT/Pulmonary:     (+)tobacco use, Past use severe COPD, O2 dependent, 3 liters/min,  , recent URI resolved . .    Neurologic:  - neg neurologic ROS     Cardiovascular:     (+) Dyslipidemia, ----. : . . . :. .       METS/Exercise Tolerance:  1 - Eating, dressing   Hematologic:     (+) Anemia, -      Musculoskeletal:  - neg musculoskeletal ROS       GI/Hepatic:         Renal/Genitourinary:  - ROS Renal section negative       Endo:  - neg endo ROS       Psychiatric:     (+) psychiatric history depression and anxiety      Infectious Disease:  - neg infectious disease ROS       Malignancy:      - no malignancy   Other:    - neg other ROS                      Physical Exam  Normal systems: cardiovascular and pulmonary    Airway   Mallampati: II  TM distance: >3 FB  Neck ROM: full    Dental   (+) upper dentures and lower dentures    Cardiovascular       Pulmonary             Lab Results   Component Value Date    WBC 10.2 2014    HGB 10.7 (L) 2014    HCT 35.1 2014     2014    SED 15 2009     2014    POTASSIUM 3.1 (L) 2014    CHLORIDE 107 2014    CO2  29 09/04/2014    BUN 9 09/04/2014    CR 0.72 09/04/2014     (H) 09/04/2014    DENIZ 8.4 (L) 09/04/2014    PHOS 2.9 06/02/2012    MAG 1.8 06/02/2012    ALBUMIN 2.9 (L) 09/04/2014    PROTTOTAL 6.8 09/04/2014    ALT 14 09/04/2014    AST 15 09/04/2014    ALKPHOS 128 09/04/2014    BILITOTAL 0.3 09/04/2014    LIPASE 94 09/24/2006    AMYLASE 93 09/24/2006    PTT 28 10/21/2003    INR 0.99 10/21/2003    TSH 2.65 02/20/2009       Preop Vitals  BP Readings from Last 3 Encounters:   01/18/19 120/72   11/27/17 118/76   03/16/17 130/74    Pulse Readings from Last 3 Encounters:   01/18/19 (P) 118   11/27/17 118   03/16/17 114      Resp Readings from Last 3 Encounters:   01/18/19 (P) 20   08/13/15 20   10/27/14 14    SpO2 Readings from Last 3 Encounters:   01/18/19 (P) 92%   11/27/17 90%   03/16/17 92%      Temp Readings from Last 1 Encounters:   01/18/19 (P) 37  C (98.6  F) ((P) Tympanic)    Ht Readings from Last 1 Encounters:   01/18/19 (P) 1.524 m (5')      Wt Readings from Last 1 Encounters:   01/18/19 (P) 45.8 kg (101 lb)    Estimated body mass index is 19.73 kg/m  (pended) as calculated from the following:    Height as of 1/18/19: (P) 1.524 m (5').    Weight as of 1/18/19: (P) 45.8 kg (101 lb).       Anesthesia Plan      History & Physical Review  History and physical reviewed and following examination; no interval change.    ASA Status:  3 .    NPO Status:  > 8 hours    Plan for General and MAC with Propofol and Intravenous induction. Maintenance will be Balanced.  Reason for MAC:  Deep or markedly invasive procedure (G8)  PONV prophylaxis:  Ondansetron (or other 5HT-3) and Dexamethasone or Solumedrol       Postoperative Care  Postoperative pain management:  IV analgesics and Oral pain medications.      Consents  Anesthetic plan, risks, benefits and alternatives discussed with:  Patient..                 MARYJANE Lynn CRNA

## 2019-01-28 ENCOUNTER — HOSPITAL ENCOUNTER (OUTPATIENT)
Facility: CLINIC | Age: 74
Discharge: HOME | End: 2019-01-28
Attending: SURGERY | Admitting: SURGERY
Payer: MEDICARE

## 2019-01-28 ENCOUNTER — NURSE TRIAGE (OUTPATIENT)
Dept: CALL CENTER | Age: 74
End: 2019-01-28

## 2019-01-28 ENCOUNTER — ANESTHESIA (OUTPATIENT)
Dept: GASTROENTEROLOGY | Facility: CLINIC | Age: 74
End: 2019-01-28
Payer: MEDICARE

## 2019-01-28 VITALS
RESPIRATION RATE: 18 BRPM | DIASTOLIC BLOOD PRESSURE: 56 MMHG | HEART RATE: 100 BPM | HEIGHT: 60 IN | WEIGHT: 101 LBS | OXYGEN SATURATION: 95 % | BODY MASS INDEX: 19.83 KG/M2 | SYSTOLIC BLOOD PRESSURE: 127 MMHG | TEMPERATURE: 98.2 F

## 2019-01-28 DIAGNOSIS — R13.10 DYSPHAGIA, UNSPECIFIED TYPE: Primary | ICD-10-CM

## 2019-01-28 LAB — UPPER GI ENDOSCOPY: NORMAL

## 2019-01-28 PROCEDURE — 25000128 H RX IP 250 OP 636: Performed by: SURGERY

## 2019-01-28 PROCEDURE — 37000008 ZZH ANESTHESIA TECHNICAL FEE, 1ST 30 MIN: Performed by: SURGERY

## 2019-01-28 PROCEDURE — 25000132 ZZH RX MED GY IP 250 OP 250 PS 637: Mod: GY | Performed by: SURGERY

## 2019-01-28 PROCEDURE — 43235 EGD DIAGNOSTIC BRUSH WASH: CPT | Performed by: SURGERY

## 2019-01-28 PROCEDURE — 25000125 ZZHC RX 250: Performed by: SURGERY

## 2019-01-28 PROCEDURE — A9270 NON-COVERED ITEM OR SERVICE: HCPCS | Mod: GY | Performed by: SURGERY

## 2019-01-28 PROCEDURE — 25000128 H RX IP 250 OP 636: Performed by: NURSE ANESTHETIST, CERTIFIED REGISTERED

## 2019-01-28 PROCEDURE — 25000125 ZZHC RX 250: Performed by: NURSE ANESTHETIST, CERTIFIED REGISTERED

## 2019-01-28 RX ORDER — ONDANSETRON 2 MG/ML
4 INJECTION INTRAMUSCULAR; INTRAVENOUS
Status: DISCONTINUED | OUTPATIENT
Start: 2019-01-28 | End: 2019-01-28 | Stop reason: HOSPADM

## 2019-01-28 RX ORDER — SODIUM CHLORIDE, SODIUM LACTATE, POTASSIUM CHLORIDE, CALCIUM CHLORIDE 600; 310; 30; 20 MG/100ML; MG/100ML; MG/100ML; MG/100ML
INJECTION, SOLUTION INTRAVENOUS CONTINUOUS
Status: DISCONTINUED | OUTPATIENT
Start: 2019-01-28 | End: 2019-01-28 | Stop reason: HOSPADM

## 2019-01-28 RX ORDER — LIDOCAINE HYDROCHLORIDE 10 MG/ML
INJECTION, SOLUTION INFILTRATION; PERINEURAL PRN
Status: DISCONTINUED | OUTPATIENT
Start: 2019-01-28 | End: 2019-01-28

## 2019-01-28 RX ORDER — SIMETHICONE 40MG/0.6ML
SUSPENSION, DROPS(FINAL DOSAGE FORM)(ML) ORAL PRN
Status: DISCONTINUED | OUTPATIENT
Start: 2019-01-28 | End: 2019-01-28 | Stop reason: HOSPADM

## 2019-01-28 RX ORDER — LIDOCAINE 40 MG/G
CREAM TOPICAL
Status: DISCONTINUED | OUTPATIENT
Start: 2019-01-28 | End: 2019-01-28 | Stop reason: HOSPADM

## 2019-01-28 RX ORDER — PROPOFOL 10 MG/ML
INJECTION, EMULSION INTRAVENOUS CONTINUOUS PRN
Status: DISCONTINUED | OUTPATIENT
Start: 2019-01-28 | End: 2019-01-28

## 2019-01-28 RX ORDER — PROPOFOL 10 MG/ML
INJECTION, EMULSION INTRAVENOUS PRN
Status: DISCONTINUED | OUTPATIENT
Start: 2019-01-28 | End: 2019-01-28

## 2019-01-28 RX ADMIN — LIDOCAINE HYDROCHLORIDE 50 MG: 10 INJECTION, SOLUTION INFILTRATION; PERINEURAL at 11:34

## 2019-01-28 RX ADMIN — PROPOFOL 50 MG: 10 INJECTION, EMULSION INTRAVENOUS at 11:34

## 2019-01-28 RX ADMIN — LIDOCAINE HYDROCHLORIDE 1 ML: 10 INJECTION, SOLUTION EPIDURAL; INFILTRATION; INTRACAUDAL; PERINEURAL at 11:18

## 2019-01-28 RX ADMIN — SODIUM CHLORIDE, POTASSIUM CHLORIDE, SODIUM LACTATE AND CALCIUM CHLORIDE 1000 ML: 600; 310; 30; 20 INJECTION, SOLUTION INTRAVENOUS at 11:18

## 2019-01-28 RX ADMIN — PROPOFOL 50 MCG/KG/MIN: 10 INJECTION, EMULSION INTRAVENOUS at 11:34

## 2019-01-28 ASSESSMENT — COPD QUESTIONNAIRES: CAT_SEVERITY: SEVERE

## 2019-01-28 ASSESSMENT — MIFFLIN-ST. JEOR: SCORE: 884.63

## 2019-01-28 NOTE — TELEPHONE ENCOUNTER
Patients mian called today to schedule appointment in GI. Referral was placed earlier today after EGD. 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  Unable to traverse.    Patient has been having difficulty swallowing with choking and coughing up of mucous for the past several months. Symptoms have been x1 year - but much worse in the past 2 months. She also has burning in her throat when swallowing for the past month.    Patient is able to eat soft foods only and in very small amounts. She is able to tolerate liquids with out choking.    Appointment should be sooner than May 3rd which is first available. She is scheduled for the May 3 rd appointment at this time.    Message will be sent to the clinic nurses. They should call the patient back at [phone]. She is aware that she may not get a call back today.    Kathleen M Doege RN

## 2019-01-28 NOTE — H&P
74 year old year old female here for upper endoscopy for GERD        Patient Active Problem List   Diagnosis     Mild major depression (H)     Hyperlipidemia LDL goal <130     Personal history of smoking     Generalized anxiety disorder     Generalized weakness     Anemia     Thrush, oral     Advanced directives, counseling/discussion     Pneumonia     Gastroenteritis     Respiratory failure, acute (H)     COPD (chronic obstructive pulmonary disease) (HCC)     COPD exacerbation (H)     Health Care Home-Not active     Mammogram declined       Past Medical History:   Diagnosis Date     ROE (generalised anxiety disorder)      Hyperlipidemia LDL goal < 130      Tobacco abuse        Past Surgical History:   Procedure Laterality Date     APPENDECTOMY       C/SECTION, LOW TRANSVERSE      , Low Transverse     CATARACT IOL, RT/LT      right-2013     CHOLECYSTECTOMY, OPEN       SURGICAL HISTORY OF -       stent to biliary tract       Family History   Problem Relation Age of Onset     Alcohol/Drug Father      Cancer Brother        No current outpatient medications on file.       Allergies   Allergen Reactions     Sulfa Drugs Swelling and Difficulty breathing       Pt reports that she quit smoking about 25 years ago. Her smoking use included cigarettes. She has a 144.00 pack-year smoking history. she has never used smokeless tobacco. She reports that she does not drink alcohol or use drugs.    Exam:    Awake, Alert OX3  Lungs - CTA bilaterally  CV - RRR, no murmurs, distal pulses intact  Abd - soft, non-distended, non-tender, +BS  Extr - No cyanosis or edema    A/P 74 year old year old female in need of upper endoscopy for GERD. Risks, benefits, alternatives, and complications were discussed including the possibility of perforation and the patient agreed to proceed.    Justin Davila MD

## 2019-01-28 NOTE — ANESTHESIA CARE TRANSFER NOTE
Patient: Tari Wiley    Procedure(s):  COMBINED ESOPHAGOSCOPY, GASTROSCOPY, DUODENOSCOPY (EGD)    Diagnosis: gastroesophageal reflux disease esophagitis presence not specified    diagnostic  Diagnosis Additional Information: No value filed.    Anesthesia Type:   No value filed.     Note:  Airway :Nasal Cannula  Patient transferred to:Phase II  Handoff Report: Identifed the Patient, Identified the Reponsible Provider, Reviewed the pertinent medical history, Discussed the surgical course, Reviewed Intra-OP anesthesia mangement and issues during anesthesia, Set expectations for post-procedure period and Allowed opportunity for questions and acknowledgement of understanding      Vitals: (Last set prior to Anesthesia Care Transfer)    CRNA VITALS  1/28/2019 1115 - 1/28/2019 1145      1/28/2019             Pulse:  98    SpO2:  97 %                Electronically Signed By: MARYJANE Jimenes CRNA  January 28, 2019  11:45 AM

## 2019-01-28 NOTE — ANESTHESIA POSTPROCEDURE EVALUATION
Patient: Tari Wiley    Procedure(s):  COMBINED ESOPHAGOSCOPY, GASTROSCOPY, DUODENOSCOPY (EGD)    Diagnosis:gastroesophageal reflux disease esophagitis presence not specified    diagnostic  Diagnosis Additional Information: No value filed.    Anesthesia Type:  No value filed.    Note:  Anesthesia Post Evaluation    Patient location during evaluation: Bedside  Patient participation: Able to fully participate in evaluation  Level of consciousness: sleepy but conscious  Pain management: adequate  Airway patency: patent  Cardiovascular status: stable  Respiratory status: nasal cannula  Hydration status: stable  PONV: none     Anesthetic complications: None          Last vitals:  Vitals:    01/28/19 1052   BP: 98/58   Pulse: 109   Resp: 20   Temp: 36.8  C (98.2  F)   SpO2: 92%         Electronically Signed By: MARYJANE Jimenes CRNA  January 28, 2019  11:45 AM

## 2019-01-29 DIAGNOSIS — R47.02 DYSPHASIA: Primary | ICD-10-CM

## 2019-01-29 DIAGNOSIS — K22.2 ESOPHAGEAL STENOSIS: ICD-10-CM

## 2019-01-29 NOTE — TELEPHONE ENCOUNTER
[facility] RN Care Coordination Note:    Called and spoke with patient regarding recommendations to proceed with EGD in the OR with Dr. Perez to further evaluate the esophagus. Informed her that this will be done with general anesthesia and that she will need a pre op H&P prior to the procedure. Informed her that our  will contact her to set up the procedure and that once that is done she will need to contact her PCP to schedule the pre op H&P prior to the procedure. She verbalized understanding and is aware she will be contacted to arrange the procedure.     Maria Guadlaupe Lamb RN   Care Coordinator   [phone]

## 2019-02-01 ENCOUNTER — TELEPHONE (OUTPATIENT)
Dept: GASTROENTEROLOGY | Facility: CLINIC | Age: 74
End: 2019-02-01

## 2019-02-01 NOTE — TELEPHONE ENCOUNTER
Spoke to patient in regards to scheduled procedure. Informed patient she is scheduled with Dr. Perez on 3/1/19. Informed patient she will need an updated pre-op physical within 30 days of her procedure. Patient stated she is going to have this done locally with her PCP. Informed patient she will need a  and someone to monitor her for 24 hours after the procedure. Confirmed location of the procedure with the patient. Informed patient all scheduling details will be sent to the address listed on Epic. Address confirmed on this call.     2/1/19 912am

## 2019-02-14 ENCOUNTER — TELEPHONE (OUTPATIENT)
Dept: FAMILY MEDICINE | Facility: CLINIC | Age: 74
End: 2019-02-14

## 2019-02-14 NOTE — TELEPHONE ENCOUNTER
FYI~ Feb 13, 2019 I spoke with Taylor Saige's niece.  Arelis is in need of financial assistance for medication.    We reviewed the Pharmacy Assistance Fund $500, the Prescription Assistance Program for manfacturer brand name assistance programs, gross income, insurance and Rx list.     assistance applications for: Spiriva Handihaler, Advair Disk & Ventolin HFA will be completed.    Noy Pickens  Prescription

## 2019-02-26 ENCOUNTER — OFFICE VISIT (OUTPATIENT)
Dept: FAMILY MEDICINE | Facility: CLINIC | Age: 74
End: 2019-02-26
Payer: MEDICARE

## 2019-02-26 VITALS
TEMPERATURE: 99.8 F | OXYGEN SATURATION: 91 % | HEART RATE: 128 BPM | HEIGHT: 60 IN | BODY MASS INDEX: 19.71 KG/M2 | WEIGHT: 100.4 LBS

## 2019-02-26 DIAGNOSIS — F32.0 MILD MAJOR DEPRESSION (H): ICD-10-CM

## 2019-02-26 DIAGNOSIS — J44.9 CHRONIC OBSTRUCTIVE PULMONARY DISEASE, UNSPECIFIED COPD TYPE (H): ICD-10-CM

## 2019-02-26 DIAGNOSIS — F41.1 GENERALIZED ANXIETY DISORDER: ICD-10-CM

## 2019-02-26 DIAGNOSIS — Z01.818 PREOP GENERAL PHYSICAL EXAM: Primary | ICD-10-CM

## 2019-02-26 LAB
ANION GAP SERPL CALCULATED.3IONS-SCNC: 6 MMOL/L (ref 3–14)
BASOPHILS # BLD AUTO: 0.2 10E9/L (ref 0–0.2)
BASOPHILS NFR BLD AUTO: 1.6 %
BUN SERPL-MCNC: 19 MG/DL (ref 7–30)
CALCIUM SERPL-MCNC: 9.1 MG/DL (ref 8.5–10.1)
CHLORIDE SERPL-SCNC: 102 MMOL/L (ref 94–109)
CO2 SERPL-SCNC: 33 MMOL/L (ref 20–32)
CREAT SERPL-MCNC: 0.99 MG/DL (ref 0.52–1.04)
DIFFERENTIAL METHOD BLD: ABNORMAL
EOSINOPHIL # BLD AUTO: 0.3 10E9/L (ref 0–0.7)
EOSINOPHIL NFR BLD AUTO: 2.7 %
ERYTHROCYTE [DISTWIDTH] IN BLOOD BY AUTOMATED COUNT: 12.5 % (ref 10–15)
GFR SERPL CREATININE-BSD FRML MDRD: 57 ML/MIN/{1.73_M2}
GLUCOSE SERPL-MCNC: 110 MG/DL (ref 70–99)
HCT VFR BLD AUTO: 42.3 % (ref 35–47)
HGB BLD-MCNC: 12.8 G/DL (ref 11.7–15.7)
IMM GRANULOCYTES # BLD: 0.2 10E9/L (ref 0–0.4)
IMM GRANULOCYTES NFR BLD: 1.2 %
LYMPHOCYTES # BLD AUTO: 1.8 10E9/L (ref 0.8–5.3)
LYMPHOCYTES NFR BLD AUTO: 14.8 %
MCH RBC QN AUTO: 28.8 PG (ref 26.5–33)
MCHC RBC AUTO-ENTMCNC: 30.3 G/DL (ref 31.5–36.5)
MCV RBC AUTO: 95 FL (ref 78–100)
MONOCYTES # BLD AUTO: 0.8 10E9/L (ref 0–1.3)
MONOCYTES NFR BLD AUTO: 6.4 %
NEUTROPHILS # BLD AUTO: 9 10E9/L (ref 1.6–8.3)
NEUTROPHILS NFR BLD AUTO: 73.3 %
NRBC # BLD AUTO: 0 10*3/UL
NRBC BLD AUTO-RTO: 0 /100
PLATELET # BLD AUTO: 503 10E9/L (ref 150–450)
POTASSIUM SERPL-SCNC: 4.5 MMOL/L (ref 3.4–5.3)
RBC # BLD AUTO: 4.44 10E12/L (ref 3.8–5.2)
SODIUM SERPL-SCNC: 141 MMOL/L (ref 133–144)
WBC # BLD AUTO: 12.3 10E9/L (ref 4–11)

## 2019-02-26 PROCEDURE — 99215 OFFICE O/P EST HI 40 MIN: CPT | Performed by: FAMILY MEDICINE

## 2019-02-26 PROCEDURE — 85025 COMPLETE CBC W/AUTO DIFF WBC: CPT | Performed by: FAMILY MEDICINE

## 2019-02-26 PROCEDURE — 36415 COLL VENOUS BLD VENIPUNCTURE: CPT | Performed by: FAMILY MEDICINE

## 2019-02-26 PROCEDURE — 80048 BASIC METABOLIC PNL TOTAL CA: CPT | Performed by: FAMILY MEDICINE

## 2019-02-26 PROCEDURE — 93000 ELECTROCARDIOGRAM COMPLETE: CPT | Performed by: FAMILY MEDICINE

## 2019-02-26 ASSESSMENT — MIFFLIN-ST. JEOR: SCORE: 877.94

## 2019-02-26 NOTE — NURSING NOTE
"Chief Complaint   Patient presents with     Pre-Op Exam       Initial Pulse 128   Temp 99.8  F (37.7  C) (Tympanic)   Ht 1.518 m (4' 11.75\")   Wt 45.5 kg (100 lb 6.4 oz)   SpO2 (!) 88%   BMI 19.77 kg/m   Estimated body mass index is 19.77 kg/m  as calculated from the following:    Height as of this encounter: 1.518 m (4' 11.75\").    Weight as of this encounter: 45.5 kg (100 lb 6.4 oz).    Patient presents to the clinic using No DME    Health Maintenance that is potentially due pending provider review:  Mammogram and Colonoscopy/FIT    Pt declines to have health maintenence.    Is there anyone who you would like to be able to receive your results? Yes  If yes have patient fill out STAR    "

## 2019-02-26 NOTE — PROGRESS NOTES
[facility]  [address] [place] [id number]  [phone]  Dept: [phone]    PRE-OP EVALUATION:  Today's date: 2019    Tari Wiley (: 1945) presents for pre-operative evaluation assessment as requested by Dr. Perez.  He requires evaluation and anesthesia risk assessment prior to undergoing surgery/procedure for treatment of dysphagia .    Fax number for surgical facility: n/a  Primary Physician: Suzy Peters  Type of Anesthesia Anticipated: to be determined    Patient has a Health Care Directive or Living Will:  YES     Preop Questions 2019   Who is doing your surgery? [facility]   What are you having done? examination my throat gastroscopy    Date of Surgery/Procedure: 19   Facility or Hospital where procedure/surgery will be performed: [facility]    1.  Do you have a history of Heart attack, stroke, stent, coronary bypass surgery, or other heart surgery? No   2.  Do you ever have any pain or discomfort in your chest? No   3.  Do you have a history of  Heart Failure? No   4.   Are you troubled by shortness of breath when:  walking on a level surface, or up a slight hill, or at night? YES - severe COPD    5.  Do you currently have a cold, bronchitis or other respiratory infection? No   6.  Do you have a cough, shortness of breath, or wheezing? YES - severe COPD    7.  Do you sometimes get pains in the calves of your legs when you walk? No   8. Do you or anyone in your family have previous history of blood clots? No   9.  Do you or does anyone in your family have a serious bleeding problem such as prolonged bleeding following surgeries or cuts? No   10. Have you ever had problems with anemia or been told to take iron pills? No   11. Have you had any abnormal blood loss such as black, tarry or bloody stools, or abnormal vaginal bleeding? No   12. Have you ever had a blood transfusion? No   13. Have you or any of your relatives ever had problems with  anesthesia? No   14. Do you have sleep apnea, excessive snoring or daytime drowsiness? YES - fatigue   15. Do you have any prosthetic heart valves? No   16. Do you have prosthetic joints? No   17. Is there any chance that you may be pregnant? No         HPI:     HPI related to upcoming procedure: dysphagia with esophageal stricture       DEPRESSION - Patient has a long history of Depression of moderate severity requiring medication for control with recent symptoms being stable..Current symptoms of depression include none.                                                                                                                                                                                    .    MEDICAL HISTORY:     Patient Active Problem List    Diagnosis Date Noted     Respiratory failure, acute (H) 05/30/2012     Priority: High     Pneumonia 05/28/2012     Priority: High     Mammogram declined 02/28/2017     Priority: Medium     Health Care Home-Not active 09/08/2014     Priority: Medium     State Tier Level:    Status:  Unable to reach  Care Coordinator:  Talita Peña    See Letters for [facility] Care Plan  Date:  September 8, 2014           Gastroenteritis 05/28/2012     Priority: Medium     Advanced directives, counseling/discussion 02/10/2012     Priority: Medium     POLST form updated:  DNR/DNI, OK to treat in ED or admit, OK to treat with IV antibiotics, oral nutrition only.  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        COPD (chronic obstructive pulmonary disease) (HCC) 06/10/2005     Priority:  Medium     COPD exacerbation (H) 06/10/2005     Priority: Medium     Generalized anxiety disorder 2011     Priority: Low     Diagnosis updated by automated process. Provider to review and confirm.        Past Medical History:   Diagnosis Date     ROE (generalised anxiety disorder)      Hyperlipidemia LDL goal < 130      Tobacco abuse      Past Surgical History:   Procedure Laterality Date     APPENDECTOMY       C/SECTION, LOW TRANSVERSE  1982    , Low Transverse     CATARACT IOL, RT/LT      right-2013     CHOLECYSTECTOMY, OPEN       ESOPHAGOSCOPY, GASTROSCOPY, DUODENOSCOPY (EGD), COMBINED N/A 2019    Procedure: COMBINED ESOPHAGOSCOPY, GASTROSCOPY, DUODENOSCOPY (EGD);  Surgeon: Justin Davila MD;  Location: [facility]     SURGICAL HISTORY OF -       stent to biliary tract     Current Outpatient Medications   Medication Sig Dispense Refill     acetaminophen (TYLENOL) 500 MG tablet Take 2 tablets by mouth every 6 hours as needed.       ADVAIR DISKUS 500-50 MCG/DOSE diskus inhaler INHALE ONE DOSE BY MOUTH EVERY 12 HOURS 3 Inhaler 3     albuterol (2.5 MG/3ML) 0.083% neb solution Take 1 vial (2.5 mg) by nebulization every 4 hours as needed for shortness of breath / dyspnea 1 Box 5     albuterol (PROAIR HFA/PROVENTIL HFA/VENTOLIN HFA) 108 (90 BASE) MCG/ACT Inhaler Inhale 2 puffs into the lungs every 6 hours 4 Inhaler 3     buPROPion (WELLBUTRIN) 75 MG tablet TAKE TWO TABLETS BY MOUTH TWICE DAILY 360 tablet 3     diphenhydrAMINE-acetaminophen (TYLENOL PM)  MG tablet Take 1-2 tablets by mouth nightly as needed.       LORazepam (ATIVAN) 0.5 MG tablet TAKE 1 TABLET BY MOUTH EVERY 6 HOURS 20 tablet 0     nystatin (MYCOSTATIN) 058615 UNIT/ML suspension SWISH AND SPIT 5 ML BY MOUTH TWICE DAILY BEFORE MEAL(S) IF THRUSH NOT IMPROVING GO TO FOUR TIMES A DAY FOR 7 DAYS 60 mL 3     omeprazole (PRILOSEC) 40 MG DR capsule Take 1 capsule (40 mg) by mouth daily 90 capsule 3     OXYGEN-HELIUM IN 2-3  "liters       sertraline (ZOLOFT) 50 MG tablet TAKE TWO TABLETS BY MOUTH ONCE DAILY 180 tablet 3     SPIRIVA HANDIHALER 18 MCG capsule INHALE ONE DOSE BY MOUTH ONCE DAILY 90 capsule 3     sucralfate (CARAFATE) 1 GM tablet Take 1 tablet (1 g) by mouth 4 times daily 120 tablet 1     OTC products: None, except as noted above    Allergies   Allergen Reactions     Sulfa Drugs Swelling and Difficulty breathing      Latex Allergy: NO    Social History     Tobacco Use     Smoking status: Former Smoker     Packs/day: 4.00     Years: 36.00     Pack years: 144.00     Types: Cigarettes     Last attempt to quit: 1993     Years since quittin.2     Smokeless tobacco: Never Used     Tobacco comment: Started at age 12   Substance Use Topics     Alcohol use: No     History   Drug Use No       REVIEW OF SYSTEMS:   CONSTITUTIONAL: NEGATIVE for fever, chills, change in weight  INTEGUMENTARY/SKIN: NEGATIVE for worrisome rashes, moles or lesions  EYES: NEGATIVE for vision changes or irritation  ENT/MOUTH: NEGATIVE for ear, mouth and throat problems  BREAST: NEGATIVE for masses, tenderness or discharge  CV: NEGATIVE for chest pain, palpitations or peripheral edema  GI: NEGATIVE for nausea, abdominal pain, heartburn, or change in bowel habits  : NEGATIVE for frequency, dysuria, or hematuria  MUSCULOSKELETAL: NEGATIVE for significant arthralgias or myalgia  NEURO: NEGATIVE for weakness, dizziness or paresthesias  ENDOCRINE: NEGATIVE for temperature intolerance, skin/hair changes  HEME: NEGATIVE for bleeding problems  PSYCHIATRIC: NEGATIVE for changes in mood or affect    EXAM:   Pulse 128   Temp 99.8  F (37.7  C) (Tympanic)   Ht 1.518 m (4' 11.75\")   Wt 45.5 kg (100 lb 6.4 oz)   SpO2 (!) 88%   BMI 19.77 kg/m      GENERAL APPEARANCE: healthy, alert and no distress     EYES: EOMI, PERRL     HENT: ear canals and TM's normal and nose and mouth without ulcers or lesions     NECK: no adenopathy, no asymmetry, masses, or scars and " thyroid normal to palpation     RESP: lungs clear to auscultation - no rales, rhonchi or wheezes     CV: regular rates and rhythm, normal S1 S2, no S3 or S4 and no murmur, click or rub     ABDOMEN:  soft, nontender, no HSM or masses and bowel sounds normal     MS: extremities normal- no gross deformities noted, no evidence of inflammation in joints, FROM in all extremities.     SKIN: no suspicious lesions or rashes     NEURO: Normal strength and tone, sensory exam grossly normal, mentation intact and speech normal     PSYCH: mentation appears normal. and affect normal/bright     LYMPHATICS: No cervical adenopathy    DIAGNOSTICS:   EKG: appears normal, NSR, normal axis, normal intervals, no acute ST/T changes c/w ischemia, no LVH by voltage criteria    Labs pending     IMPRESSION:   Reason for surgery/procedure: Esophageal Stricture     The proposed surgical procedure is considered INTERMEDIATE risk.    REVISED CARDIAC RISK INDEX  The patient has the following serious cardiovascular risks for perioperative complications such as (MI, PE, VFib and 3  AV Block):  No serious cardiac risks  INTERPRETATION: 0 risks: Class I (very low risk - 0.4% complication rate)    The patient has the following additional risks for perioperative complications:  Oxygen dependent lung disease      ICD-10-CM    1. Preop general physical exam Z01.818 EKG 12-lead complete w/read - Clinics   2. Mild major depression (H) F32.0    3. Chronic obstructive pulmonary disease, unspecified COPD type (H) J44.9 Basic metabolic panel     CBC with platelets differential   4. Generalized anxiety disorder F41.1        RECOMMENDATIONS:       Pulmonary Risk  Incentive spirometry post op  Respiratory Therapy (Respiratory Care IP Consult)  post op  Maximize COPD treatment pt to use her inhalers the day of surgery   She is Oxygen dependent 2-3 liters        --Patient is to take all scheduled medications on the day of surgery     APPROVAL GIVEN to proceed with  proposed procedure, without further diagnostic evaluation       Signed Electronically by: Suzy Peters MD    Copy of this evaluation report is provided to requesting physician.    [place] Preop Guidelines    Revised Cardiac Risk Index

## 2019-02-28 ENCOUNTER — ANESTHESIA EVENT (OUTPATIENT)
Dept: SURGERY | Facility: CLINIC | Age: 74
End: 2019-02-28
Payer: MEDICARE

## 2019-02-28 ASSESSMENT — COPD QUESTIONNAIRES
COPD: 1
CAT_SEVERITY: SEVERE

## 2019-02-28 ASSESSMENT — LIFESTYLE VARIABLES: TOBACCO_USE: 1

## 2019-03-01 ENCOUNTER — HOSPITAL ENCOUNTER (OUTPATIENT)
Facility: CLINIC | Age: 74
Discharge: HOME | End: 2019-03-01
Attending: INTERNAL MEDICINE | Admitting: INTERNAL MEDICINE
Payer: MEDICARE

## 2019-03-01 ENCOUNTER — ANESTHESIA (OUTPATIENT)
Dept: SURGERY | Facility: CLINIC | Age: 74
End: 2019-03-01
Payer: MEDICARE

## 2019-03-01 ENCOUNTER — CARE COORDINATION (OUTPATIENT)
Dept: GASTROENTEROLOGY | Facility: CLINIC | Age: 74
End: 2019-03-01

## 2019-03-01 VITALS
HEART RATE: 96 BPM | HEIGHT: 60 IN | SYSTOLIC BLOOD PRESSURE: 139 MMHG | RESPIRATION RATE: 14 BRPM | DIASTOLIC BLOOD PRESSURE: 61 MMHG | BODY MASS INDEX: 20.04 KG/M2 | WEIGHT: 102.07 LBS | TEMPERATURE: 97.9 F | OXYGEN SATURATION: 100 %

## 2019-03-01 DIAGNOSIS — B37.81 CANDIDA ESOPHAGITIS (H): Primary | ICD-10-CM

## 2019-03-01 DIAGNOSIS — K22.2 ESOPHAGEAL STRICTURE: Primary | ICD-10-CM

## 2019-03-01 LAB
GLUCOSE BLDC GLUCOMTR-MCNC: 105 MG/DL (ref 70–99)
KOH PREP SPEC: ABNORMAL
SPECIMEN SOURCE: ABNORMAL
UPPER GI ENDOSCOPY: NORMAL

## 2019-03-01 PROCEDURE — 88305 TISSUE EXAM BY PATHOLOGIST: CPT | Performed by: INTERNAL MEDICINE

## 2019-03-01 PROCEDURE — 88312 SPECIAL STAINS GROUP 1: CPT | Performed by: INTERNAL MEDICINE

## 2019-03-01 PROCEDURE — 37000008 ZZH ANESTHESIA TECHNICAL FEE, 1ST 30 MIN: Performed by: INTERNAL MEDICINE

## 2019-03-01 PROCEDURE — 37000009 ZZH ANESTHESIA TECHNICAL FEE, EACH ADDTL 15 MIN: Performed by: INTERNAL MEDICINE

## 2019-03-01 PROCEDURE — 40000170 ZZH STATISTIC PRE-PROCEDURE ASSESSMENT II: Performed by: INTERNAL MEDICINE

## 2019-03-01 PROCEDURE — 25000125 ZZHC RX 250: Performed by: STUDENT IN AN ORGANIZED HEALTH CARE EDUCATION/TRAINING PROGRAM

## 2019-03-01 PROCEDURE — 36000062 ZZH SURGERY LEVEL 4 1ST 30 MIN - UMMC: Performed by: INTERNAL MEDICINE

## 2019-03-01 PROCEDURE — 87102 FUNGUS ISOLATION CULTURE: CPT | Performed by: INTERNAL MEDICINE

## 2019-03-01 PROCEDURE — 25800030 ZZH RX IP 258 OP 636: Performed by: STUDENT IN AN ORGANIZED HEALTH CARE EDUCATION/TRAINING PROGRAM

## 2019-03-01 PROCEDURE — 36000064 ZZH SURGERY LEVEL 4 EA 15 ADDTL MIN - UMMC: Performed by: INTERNAL MEDICINE

## 2019-03-01 PROCEDURE — 71000027 ZZH RECOVERY PHASE 2 EACH 15 MINS: Performed by: INTERNAL MEDICINE

## 2019-03-01 PROCEDURE — 25000128 H RX IP 250 OP 636: Performed by: STUDENT IN AN ORGANIZED HEALTH CARE EDUCATION/TRAINING PROGRAM

## 2019-03-01 PROCEDURE — 87210 SMEAR WET MOUNT SALINE/INK: CPT | Performed by: INTERNAL MEDICINE

## 2019-03-01 PROCEDURE — A9270 NON-COVERED ITEM OR SERVICE: HCPCS | Performed by: INTERNAL MEDICINE

## 2019-03-01 PROCEDURE — 25000125 ZZHC RX 250: Performed by: INTERNAL MEDICINE

## 2019-03-01 PROCEDURE — 87106 FUNGI IDENTIFICATION YEAST: CPT | Performed by: INTERNAL MEDICINE

## 2019-03-01 PROCEDURE — 82962 GLUCOSE BLOOD TEST: CPT

## 2019-03-01 PROCEDURE — 27210794 ZZH OR GENERAL SUPPLY STERILE: Performed by: INTERNAL MEDICINE

## 2019-03-01 RX ORDER — SODIUM CHLORIDE, SODIUM LACTATE, POTASSIUM CHLORIDE, CALCIUM CHLORIDE 600; 310; 30; 20 MG/100ML; MG/100ML; MG/100ML; MG/100ML
INJECTION, SOLUTION INTRAVENOUS CONTINUOUS PRN
Status: DISCONTINUED | OUTPATIENT
Start: 2019-03-01 | End: 2019-03-01

## 2019-03-01 RX ORDER — ONDANSETRON 2 MG/ML
4 INJECTION INTRAMUSCULAR; INTRAVENOUS
Status: DISCONTINUED | OUTPATIENT
Start: 2019-03-01 | End: 2019-03-01 | Stop reason: HOSPADM

## 2019-03-01 RX ORDER — LIDOCAINE 40 MG/G
CREAM TOPICAL
Status: DISCONTINUED | OUTPATIENT
Start: 2019-03-01 | End: 2019-03-01 | Stop reason: HOSPADM

## 2019-03-01 RX ORDER — LABETALOL HYDROCHLORIDE 5 MG/ML
10 INJECTION, SOLUTION INTRAVENOUS
Status: DISCONTINUED | OUTPATIENT
Start: 2019-03-01 | End: 2019-03-01 | Stop reason: HOSPADM

## 2019-03-01 RX ORDER — ONDANSETRON 4 MG/1
4 TABLET, ORALLY DISINTEGRATING ORAL EVERY 30 MIN PRN
Status: DISCONTINUED | OUTPATIENT
Start: 2019-03-01 | End: 2019-03-01 | Stop reason: HOSPADM

## 2019-03-01 RX ORDER — NALOXONE HYDROCHLORIDE 0.4 MG/ML
.1-.4 INJECTION, SOLUTION INTRAMUSCULAR; INTRAVENOUS; SUBCUTANEOUS
Status: CANCELLED | OUTPATIENT
Start: 2019-03-01 | End: 2019-03-02

## 2019-03-01 RX ORDER — FENTANYL CITRATE 50 UG/ML
INJECTION, SOLUTION INTRAMUSCULAR; INTRAVENOUS PRN
Status: DISCONTINUED | OUTPATIENT
Start: 2019-03-01 | End: 2019-03-01

## 2019-03-01 RX ORDER — NALOXONE HYDROCHLORIDE 0.4 MG/ML
.1-.4 INJECTION, SOLUTION INTRAMUSCULAR; INTRAVENOUS; SUBCUTANEOUS
Status: DISCONTINUED | OUTPATIENT
Start: 2019-03-01 | End: 2019-03-01 | Stop reason: HOSPADM

## 2019-03-01 RX ORDER — MEPERIDINE HYDROCHLORIDE 50 MG/ML
12.5 INJECTION INTRAMUSCULAR; INTRAVENOUS; SUBCUTANEOUS
Status: DISCONTINUED | OUTPATIENT
Start: 2019-03-01 | End: 2019-03-01 | Stop reason: HOSPADM

## 2019-03-01 RX ORDER — PANTOPRAZOLE SODIUM 40 MG/1
40 TABLET, DELAYED RELEASE ORAL 2 TIMES DAILY
Qty: 60 TABLET | Refills: 1 | Status: SHIPPED | OUTPATIENT
Start: 2019-03-01 | End: 2019-03-01

## 2019-03-01 RX ORDER — SODIUM CHLORIDE, SODIUM LACTATE, POTASSIUM CHLORIDE, CALCIUM CHLORIDE 600; 310; 30; 20 MG/100ML; MG/100ML; MG/100ML; MG/100ML
INJECTION, SOLUTION INTRAVENOUS CONTINUOUS
Status: DISCONTINUED | OUTPATIENT
Start: 2019-03-01 | End: 2019-03-01 | Stop reason: HOSPADM

## 2019-03-01 RX ORDER — ONDANSETRON 2 MG/ML
INJECTION INTRAMUSCULAR; INTRAVENOUS PRN
Status: DISCONTINUED | OUTPATIENT
Start: 2019-03-01 | End: 2019-03-01

## 2019-03-01 RX ORDER — FLUCONAZOLE 200 MG/1
200 TABLET ORAL DAILY
Qty: 15 TABLET | Refills: 0 | Status: SHIPPED | OUTPATIENT
Start: 2019-03-01 | End: 2019-03-15

## 2019-03-01 RX ORDER — PROPOFOL 10 MG/ML
INJECTION, EMULSION INTRAVENOUS PRN
Status: DISCONTINUED | OUTPATIENT
Start: 2019-03-01 | End: 2019-03-01

## 2019-03-01 RX ORDER — ONDANSETRON 4 MG/1
4 TABLET, ORALLY DISINTEGRATING ORAL EVERY 6 HOURS PRN
Status: CANCELLED | OUTPATIENT
Start: 2019-03-01

## 2019-03-01 RX ORDER — FLUMAZENIL 0.1 MG/ML
0.2 INJECTION, SOLUTION INTRAVENOUS
Status: CANCELLED | OUTPATIENT
Start: 2019-03-01 | End: 2019-03-01

## 2019-03-01 RX ORDER — ONDANSETRON 2 MG/ML
4 INJECTION INTRAMUSCULAR; INTRAVENOUS EVERY 30 MIN PRN
Status: DISCONTINUED | OUTPATIENT
Start: 2019-03-01 | End: 2019-03-01 | Stop reason: HOSPADM

## 2019-03-01 RX ORDER — ONDANSETRON 2 MG/ML
4 INJECTION INTRAMUSCULAR; INTRAVENOUS EVERY 6 HOURS PRN
Status: CANCELLED | OUTPATIENT
Start: 2019-03-01

## 2019-03-01 RX ORDER — FENTANYL CITRATE 50 UG/ML
25-50 INJECTION, SOLUTION INTRAMUSCULAR; INTRAVENOUS EVERY 5 MIN PRN
Status: DISCONTINUED | OUTPATIENT
Start: 2019-03-01 | End: 2019-03-01 | Stop reason: HOSPADM

## 2019-03-01 RX ORDER — PROPOFOL 10 MG/ML
INJECTION, EMULSION INTRAVENOUS CONTINUOUS PRN
Status: DISCONTINUED | OUTPATIENT
Start: 2019-03-01 | End: 2019-03-01

## 2019-03-01 RX ADMIN — TOPICAL ANESTHETIC 1 EACH: 200 SPRAY DENTAL; PERIODONTAL at 09:19

## 2019-03-01 RX ADMIN — FENTANYL CITRATE 50 MCG: 50 INJECTION, SOLUTION INTRAMUSCULAR; INTRAVENOUS at 09:21

## 2019-03-01 RX ADMIN — PROPOFOL 20 MG: 10 INJECTION, EMULSION INTRAVENOUS at 09:24

## 2019-03-01 RX ADMIN — PROPOFOL 100 MCG/KG/MIN: 10 INJECTION, EMULSION INTRAVENOUS at 09:20

## 2019-03-01 RX ADMIN — ONDANSETRON 4 MG: 2 INJECTION INTRAMUSCULAR; INTRAVENOUS at 09:39

## 2019-03-01 RX ADMIN — MIDAZOLAM 0.5 MG: 1 INJECTION INTRAMUSCULAR; INTRAVENOUS at 09:25

## 2019-03-01 RX ADMIN — SODIUM CHLORIDE, POTASSIUM CHLORIDE, SODIUM LACTATE AND CALCIUM CHLORIDE: 600; 310; 30; 20 INJECTION, SOLUTION INTRAVENOUS at 09:13

## 2019-03-01 ASSESSMENT — MIFFLIN-ST. JEOR: SCORE: 889.5

## 2019-03-01 NOTE — ANESTHESIA POSTPROCEDURE EVALUATION
Anesthesia POST Procedure Evaluation    Patient: Tari Wiley   MRN:     [id number] Gender:   female   Age:    74 year old :      1945        Preoperative Diagnosis: Esophageal Stenosis   Procedure(s):  Upper Endoscopy With EMR, and brushings   Postop Comments: No value filed.       Anesthesia Type:  MAC    Reportable Event: NO     PAIN: Uncomplicated   Sign Out status: Comfortable, Well controlled pain     PONV: No PONV   Sign Out status:  No Nausea or Vomiting     Neuro/Psych: Uneventful perioperative course   Sign Out Status: Preoperative baseline; Age appropriate mentation     Airway/Resp.: Uneventful perioperative course   Sign Out Status: Non labored breathing, age appropriate RR; Resp. Status within EXPECTED Parameters     CV: Uneventful perioperative course   Sign Out status: Appropriate BP and perfusion indices; Appropriate HR/Rhythm     Disposition:   Sign Out in:  Phase II  Disposition:  Home  Recovery Course: Uneventful  Follow-Up: Not required           Last Anesthesia Record Vitals:  CRNA VITALS  3/1/2019 0919 - 3/1/2019 1017      3/1/2019             Pulse:  111    SpO2:  99 %          Last PACU/Preop Vitals:  Vitals:    19 0747 19 0957   BP: (!) 148/94 125/45   Pulse: 112 98   Resp: 18 16   Temp: 36.6  C (97.9  F) 36.6  C (97.9  F)   SpO2: 92% 100%         Electronically Signed By: Porter Leal MD, 2019, 10:17 AM

## 2019-03-01 NOTE — OR NURSING
Dr. Perez at bedside in phase 2 to speak with patient and family. Patient okay to Discharge per Dr. Perez.

## 2019-03-01 NOTE — DISCHARGE INSTRUCTIONS
[facility]  Same-Day Surgery   Adult Discharge Orders & Instructions     For 24 hours after surgery    1. Get plenty of rest.  A responsible adult must stay with you for at least 24 hours after you leave the hospital.   2. Do not drive or use heavy equipment.  If you have weakness or tingling, don't drive or use heavy equipment until this feeling goes away.  3. Do not drink alcohol.  4. Avoid strenuous or risky activities.  Ask for help when climbing stairs.   5. You may feel lightheaded.  IF so, sit for a few minutes before standing.  Have someone help you get up.   6. If you have nausea (feel sick to your stomach): Drink only clear liquids such as apple juice, ginger ale, broth or 7-Up.  Rest may also help.  Be sure to drink enough fluids.  Move to a regular diet as you feel able.  7. You may have a slight fever. Call the doctor if your fever is over 100 F (37.7 C) (taken under the tongue) or lasts longer than 24 hours.  8. You may have a dry mouth, a sore throat, muscle aches or trouble sleeping.  These should go away after 24 hours.  9. Do not make important or legal decisions.   Call your doctor for any of the followin.  Signs of infection (fever, growing tenderness at the surgery site, a large amount of drainage or bleeding, severe pain, foul-smelling drainage, redness, swelling).    2. It has been over 8 to 10 hours since surgery and you are still not able to urinate (pass water).    3.  Headache for over 24 hours.    4.  Numbness, tingling or weakness the day after surgery (if you had spinal anesthesia).  To contact a doctor, call Dr. Perez at [phone]   or:        [phone] and ask for the resident on call for   Gastroenterology  (answered 24 hours a day)      Emergency Department:    [facility]: [phone]       (TTY for hearing impaired: [phone])    [facility]: [phone]       (TTY for hearing impaired: [phone])

## 2019-03-01 NOTE — OP NOTE
Upper GI Endoscopy 03/01/2019  8:44 AM [facility], [address] [place] [address] [phone]     Endoscopy Department   _______________________________________________________________________________   Patient Name: Tari Wiley         Procedure Date: 3/1/2019 8:44 AM   MRN: [id number]                       Account Number: [id number]   YOB: 1945              Admit Type: Outpatient   Age: 74                                Gender: Female   Note Status: Finalized                Attending MD: John Perez MD   Pause for the Cause: time out performed Total Sedation Time:   _______________________________________________________________________________       Procedure:           Upper GI endoscopy   Indications:         Dysphagia for 2-3 months; upper esophageal strictured                        noted on EGD from 1/28/19 by Dr. Davila that was not                        traversable   Providers:           John Perez MD   Referring MD:           Requesting Provider: Justin Davila MD   Medicines:           Monitored Anesthesia Care   Complications:       No immediate complications. Estimated blood loss:                        Minimal.   _______________________________________________________________________________   Procedure:           Pre-Anesthesia Assessment:                        - Prior to the procedure, a History and Physical was                        performed, and patient medications and allergies were                        reviewed. The patient is competent. The risks and                        benefits of the procedure and the sedation options and                        risks were discussed with the patient. All questions                        were answered and informed consent was obtained. Patient                        identification and proposed procedure were verified by                        the physician, the nurse, the anesthesiologist  and the                        anesthetist in the procedure room. Mental Status                        Examination: alert and oriented. Airway Examination:                        normal oropharyngeal airway and neck mobility.                        Respiratory Examination: clear to auscultation. CV                        Examination: normal. Prophylactic Antibiotics: The                        patient does not require prophylactic antibiotics. Prior                        Anticoagulants: The patient has taken no previous                        anticoagulant or antiplatelet agents. ASA Grade                        Assessment: III - A patient with severe systemic                        disease. After reviewing the risks and benefits, the                        patient was deemed in satisfactory condition to undergo                        the procedure. The anesthesia plan was to use monitored                        anesthesia care (MAC). Immediately prior to                        administration of medications, the patient was                        re-assessed for adequacy to receive sedatives. The heart                        rate, respiratory rate, oxygen saturations, blood                        pressure, adequacy of pulmonary ventilation, and                        response to care were monitored throughout the                        procedure. The physical status of the patient was                        re-assessed after the procedure.                        After obtaining informed consent, the endoscope was                        passed under direct vision. Throughout the procedure,                        the patient's blood pressure, pulse, and oxygen                        saturations were monitored continuously. The Endoscope                        was introduced through the mouth, and advanced to the                        second part of duodenum. The Endoscope was introduced                         through the mouth, and advanced to the second part of                        duodenum. The upper GI endoscopy was accomplished                        without difficulty. The patient tolerated the procedure                        well.                                                                                     Findings:        Pediatric gastroscope first passed all the way to the duodenum.        One benign-appearing, intrinsic stenosis was found 20 to 22 cm from the        incisors. This stenosis was moderately severe and measured 8 mm (inner        diameter) x 2 cm (in length). The stenosis was traversed. This stricture        was then passively dilated with an adult gastroscope which was advanced        all the way to the duodneum.        Diffuse, white plaques were found in the entire esophagus. Brushings for        KOH prep were obtained in the entire esophagus. Verification of patient        identification for the specimen was done by the physician and nurse        using the patient's name, birth date and medical record number.        Estimated blood loss was minimal.        One 10 mm frond-like polyp was found 34 to 35 cm from the incisors. The        polyp was removed with a cold snare. Resection and retrieval were        complete. Verification of patient identification for the specimen was        done by the physician and nurse using the patient's name, birth date and        medical record number. Estimated blood loss was minimal.        The stomach was normal.        The examined duodenum was normal.                                                                                     Impression:          - Diffuse esophageal candidiasis. Brushings obtained to                        confirm. Most likely due to steroid inhaler use.                        - Associated benign appearing stricture in the upper                        esophagus. Passage dilation with an adult gastroscope                         performed (~10 mm). Did not dilate further given active                        fungal infection. Should have improvement after                        treatment of esophageal candidiasis.                        - 1 cm frond-like polyp in the lower esophagus.                        Endoscopic appearing more consistent with an inflamatory                        polyp from the candidiasis or possibly a papilloma.                        Resected and retrieved.                        - Normal stomach.                        - Normal examined duodenum.   Recommendation:      - Discharge patient to home (ambulatory).                        - Await pathology results.                        - Diflucan (fluconazole) 400 mg loading dose today, then                        200 mg PO daily for 2 weeks.                        - Pantoprazole 40 mg twice daily until repeat upper                        endoscopy                        - Repeat upper endoscopy in 1 month in the OR with Dr. Perez to check healing.                                                                                       John Perez MD

## 2019-03-01 NOTE — ANESTHESIA PREPROCEDURE EVALUATION
Anesthesia Pre-Procedure Evaluation    Patient: Tari Wiley   MRN:     [id number] Gender:   female   Age:    74 year old :      1945        Preoperative Diagnosis: Esophageal Stenosis   Procedure(s):  Upper Endoscopy With Fluoroscopy     Past Medical History:   Diagnosis Date     ROE (generalised anxiety disorder)      Hyperlipidemia LDL goal < 130      Tobacco abuse       Past Surgical History:   Procedure Laterality Date     APPENDECTOMY       C/SECTION, LOW TRANSVERSE      , Low Transverse     CATARACT IOL, RT/LT      right-2013     CHOLECYSTECTOMY, OPEN       ESOPHAGOSCOPY, GASTROSCOPY, DUODENOSCOPY (EGD), COMBINED N/A 2019    Procedure: COMBINED ESOPHAGOSCOPY, GASTROSCOPY, DUODENOSCOPY (EGD);  Surgeon: Justin Davila MD;  Location: [facility]     SURGICAL HISTORY OF -       stent to biliary tract          Anesthesia Evaluation     . Pt has had prior anesthetic. Type: MAC and General           ROS/MED HX    ENT/Pulmonary: Comment: H/o acute respiratory failure    (+)tobacco use, Past use severe COPD, O2 dependent, during Both 3 liters/min,  , . .    Neurologic:  - neg neurologic ROS     Cardiovascular:  - neg cardiovascular ROS       METS/Exercise Tolerance:  1 - Eating, dressing   Hematologic:  - neg hematologic  ROS       Musculoskeletal:  - neg musculoskeletal ROS       GI/Hepatic:     (+) Other GI/Hepatic Desphagia with esoph. stricture      Renal/Genitourinary:  - ROS Renal section negative       Endo:  - neg endo ROS       Psychiatric:     (+) psychiatric history depression and anxiety      Infectious Disease:  - neg infectious disease ROS       Malignancy:      - no malignancy   Other:    (+) C-spine cleared: No, no H/O Chronic Pain,no other significant disability                        PHYSICAL EXAM:   Mental Status/Neuro: A/A/O   Airway: Facies: Feasible  Mallampati: II  Mouth/Opening: Full  TM distance: < 6 cm  Neck ROM: Full   Respiratory: Auscultation:  "Decreased BS     Resp. Rate: Normal     Resp. Effort: Normal      CV:    Comments:      Dental:  Dentures: Upper; Lower                  Lab Results   Component Value Date    WBC 12.3 (H) 02/26/2019    HGB 12.8 02/26/2019    HCT 42.3 02/26/2019     (H) 02/26/2019    SED 15 02/20/2009     02/26/2019    POTASSIUM 4.5 02/26/2019    CHLORIDE 102 02/26/2019    CO2 33 (H) 02/26/2019    BUN 19 02/26/2019    CR 0.99 02/26/2019     (H) 02/26/2019    DENIZ 9.1 02/26/2019    PHOS 2.9 06/02/2012    MAG 1.8 06/02/2012    ALBUMIN 2.9 (L) 09/04/2014    PROTTOTAL 6.8 09/04/2014    ALT 14 09/04/2014    AST 15 09/04/2014    ALKPHOS 128 09/04/2014    BILITOTAL 0.3 09/04/2014    LIPASE 94 09/24/2006    AMYLASE 93 09/24/2006    PTT 28 10/21/2003    INR 0.99 10/21/2003    TSH 2.65 02/20/2009       Preop Vitals  BP Readings from Last 3 Encounters:   01/28/19 127/56   01/18/19 120/72   11/27/17 118/76    Pulse Readings from Last 3 Encounters:   02/26/19 128   01/28/19 100   01/18/19 (P) 118      Resp Readings from Last 3 Encounters:   01/28/19 (P) 20   01/18/19 (P) 20   08/13/15 20    SpO2 Readings from Last 3 Encounters:   02/26/19 91%   01/28/19 95%   01/18/19 (P) 92%      Temp Readings from Last 1 Encounters:   02/26/19 37.7  C (99.8  F) (Tympanic)    Ht Readings from Last 1 Encounters:   02/26/19 1.518 m (4' 11.75\")      Wt Readings from Last 1 Encounters:   02/26/19 45.5 kg (100 lb 6.4 oz)    Estimated body mass index is 19.77 kg/m  as calculated from the following:    Height as of 2/26/19: 1.518 m (4' 11.75\").    Weight as of 2/26/19: 45.5 kg (100 lb 6.4 oz).     LDA:            Assessment:   ASA SCORE: 3    NPO Status: > 2 hours since completed Clear Liquids; > 6 hours since completed Solid Foods   Documentation: H&P complete; Preop Testing complete; Consents complete   Proceeding: Proceed without further delay  Tobacco Use:  NO Active use of Tobacco/UNKNOWN Tobacco use status     Plan:   Anes. Type:  MAC    "   Induction:  IV (Standard)   Airway: Native Airway   Access/Monitoring: PIV   Maintenance: Propofol; IV   Emergence: Procedure Site   Logistics: Same Day Surgery     Postop Pain/Sedation Strategy:  Standard-Options: Opioids PRN     PONV Management:  Adult Risk Factors: Female, Non-Smoker, Postop Opioids  Prevention: Propofol Infusion; Ondansetron     CONSENT: Direct conversation   Plan and risks discussed with: Patient   Blood Products: Consent Deferred (Minimal Blood Loss)       Comments for Plan/Consent:  MAC with GA as backup                         Cindy Castaneda MD

## 2019-03-01 NOTE — ANESTHESIA CARE TRANSFER NOTE
Patient: Tari Wiley    Procedure(s):  Upper Endoscopy With EMR, and brushings    Diagnosis: Esophageal Stenosis  Diagnosis Additional Information: No value filed.    Anesthesia Type:   No value filed.     Note:  Airway :Nasal Cannula  Patient transferred to:Phase II  Comments: Airway : Nasal Cannula  Patient transferred to:Phase II  Comments: patient is breathing spontaneously with appropriate respiratory rate and tidal volume. The patient is following commands, warm and demonstrated adequate strength.   Transported to Phase II on 6L O2 via Nasal Cannula  VSS upon arrival to Phase II.  Patient denies nausea or pain at this time.   Care transfer plan communicated and patient care transferred to Phase II RN     Cindy Castaneda MD  Anesthesia Resident - CA1  3/1/2019  10:02 AMHandoff Report: Identifed the Patient, Identified the Reponsible Provider, Reviewed the pertinent medical history, Discussed the surgical course, Reviewed Intra-OP anesthesia mangement and issues during anesthesia, Set expectations for post-procedure period and Allowed opportunity for questions and acknowledgement of understanding      Vitals: (Last set prior to Anesthesia Care Transfer)    CRNA VITALS  3/1/2019 0919 - 3/1/2019 1000      3/1/2019             Pulse:  111    SpO2:  99 %                Electronically Signed By: Cindy Castaneda MD  March 1, 2019  10:00 AM

## 2019-03-02 NOTE — PROGRESS NOTES
[facility] RN Care Coordination Note:    Procedure requested: EGD with possible dilation    Requesting provider: Dr. Perez     Indication: esophageal stricture, 1 month follow up.     Urgency: 1 month from previous procedure.     Pre procedure testing needed: pre op H&P    PAC appointment: No.      Other notes:     03/01/2019 6:49 PM - Received a procedure follow up request for patient to be set up for a 1 month follow up for EGD with possible dilation. Orders placed and routed to scheduling.       Maria Guadalupe Lamb RN   Care Coordinator   [phone]

## 2019-03-05 ENCOUNTER — DOCUMENTATION ONLY (OUTPATIENT)
Dept: CARE COORDINATION | Facility: CLINIC | Age: 74
End: 2019-03-05

## 2019-03-06 ENCOUNTER — TELEPHONE (OUTPATIENT)
Dept: FAMILY MEDICINE | Facility: CLINIC | Age: 74
End: 2019-03-06

## 2019-03-06 DIAGNOSIS — J44.9 CHRONIC OBSTRUCTIVE PULMONARY DISEASE, UNSPECIFIED COPD TYPE (H): ICD-10-CM

## 2019-03-06 RX ORDER — ALBUTEROL SULFATE 90 UG/1
2 AEROSOL, METERED RESPIRATORY (INHALATION) EVERY 6 HOURS
Qty: 4 INHALER | Refills: 3 | Status: SHIPPED | OUTPATIENT
Start: 2019-03-06 | End: 2020-02-20

## 2019-03-06 NOTE — TELEPHONE ENCOUNTER
"Requested Prescriptions   Pending Prescriptions Disp Refills     ADVAIR DISKUS 500-50 MCG/DOSE inhaler 3 Inhaler 3     Sig: INHALE ONE DOSE BY MOUTH EVERY 12 HOURS    Inhaled Steroids Protocol Passed - 3/6/2019  1:35 PM       Passed - Patient is age 12 or older       Passed - Recent (12 mo) or future (30 days) visit within the authorizing provider's specialty    Patient had office visit in the last 12 months or has a visit in the next 30 days with authorizing provider or within the authorizing provider's specialty.  See \"Patient Info\" tab in inbasket, or \"Choose Columns\" in Meds & Orders section of the refill encounter.             Passed - Medication is active on med list        albuterol (PROAIR HFA/PROVENTIL HFA/VENTOLIN HFA) 108 (90 Base) MCG/ACT inhaler 4 Inhaler 3     Sig: Inhale 2 puffs into the lungs every 6 hours    Asthma Maintenance Inhalers - Anticholinergics Passed - 3/6/2019  1:35 PM       Passed - Patient is age 12 years or older       Passed - Recent (12 mo) or future (30 days) visit within the authorizing provider's specialty    Patient had office visit in the last 12 months or has a visit in the next 30 days with authorizing provider or within the authorizing provider's specialty.  See \"Patient Info\" tab in inbasket, or \"Choose Columns\" in Meds & Orders section of the refill encounter.             Passed - Medication is active on med list        Last Written Prescription Date:  2/20/18  Last Fill Quantity: 3,  # refills: 3   Last office visit: 2/26/2019 with prescribing provider:  MIKE   Future Office Visit:   Next 5 appointments (look out 90 days)    Mar 07, 2019  2:40 PM CST  Office Visit with MARYJANE Beckwith CNP  [facility] ([facility]) [address] [place] [id number] [phone]        ALBUTEROL INHALER      Last Written Prescription Date:  2/20/18  Last Fill Quantity: 4,   # refills: 3  Last Office Visit: 2/26/19  " REHDER  Future Office visit:    Next 5 appointments (look out 90 days)    Mar 07, 2019  2:40 PM CST  Office Visit with MARYJANE Beckwith CNP  [facility] ([facility]) [address] [place] [id number] [phone]

## 2019-03-06 NOTE — TELEPHONE ENCOUNTER
Reason for call:  Patient reporting a symptom    Symptom or request: Saige says she had surgery on there throat at the [facility] 3/1. She says she has been worn down and weak and shakey. She wasn't able to eat but now is eating and drinking and taking Ensure.     Phone Number patient can be reached at:  Home number on file [phone] (home)    Best Time:  anytime    Can we leave a detailed message on this number:  YES    Call taken on 3/6/2019 at 10:12 AM by Patrica King

## 2019-03-06 NOTE — TELEPHONE ENCOUNTER
Pt called. States she is feeling very shaky today. States she is eating. States she has been feeling shaky since discharge but is to improving. Hospital f/u made for tomorrow. Pt advised to continue medications as prescribed. Kate Willard RN

## 2019-03-06 NOTE — TELEPHONE ENCOUNTER
I am in process of applying to the [employer] HFA  assistance program through [employer].  [employer] requires hand signed, brand name, hard copy scripts be submitted with their application.    Please hand sign a BRAND name, hard copy script for:    ADVAIR DISKUS    VENTOLIN HFA    Please send the script to me via interoffice mail FPS Noy Milligan or via [place] mail  at:      [facility]   Noy Pickens   [address] [place]  [address]    Thanks so much for your help!    Noy Pickens  Prescription

## 2019-03-07 ENCOUNTER — OFFICE VISIT (OUTPATIENT)
Dept: FAMILY MEDICINE | Facility: CLINIC | Age: 74
End: 2019-03-07
Payer: MEDICARE

## 2019-03-07 VITALS
OXYGEN SATURATION: 91 % | SYSTOLIC BLOOD PRESSURE: 98 MMHG | WEIGHT: 100 LBS | RESPIRATION RATE: 24 BRPM | DIASTOLIC BLOOD PRESSURE: 62 MMHG | TEMPERATURE: 98.4 F | BODY MASS INDEX: 19.53 KG/M2 | HEART RATE: 66 BPM

## 2019-03-07 DIAGNOSIS — J06.9 VIRAL URI: Primary | ICD-10-CM

## 2019-03-07 DIAGNOSIS — M62.81 MUSCLE WEAKNESS (GENERALIZED): ICD-10-CM

## 2019-03-07 LAB
ANION GAP SERPL CALCULATED.3IONS-SCNC: 6 MMOL/L (ref 3–14)
BASOPHILS # BLD AUTO: 0.2 10E9/L (ref 0–0.2)
BASOPHILS NFR BLD AUTO: 1.6 %
BUN SERPL-MCNC: 26 MG/DL (ref 7–30)
CALCIUM SERPL-MCNC: 9.3 MG/DL (ref 8.5–10.1)
CHLORIDE SERPL-SCNC: 102 MMOL/L (ref 94–109)
CO2 SERPL-SCNC: 32 MMOL/L (ref 20–32)
COPATH REPORT: NORMAL
CREAT SERPL-MCNC: 1.08 MG/DL (ref 0.52–1.04)
DIFFERENTIAL METHOD BLD: ABNORMAL
EOSINOPHIL # BLD AUTO: 0.4 10E9/L (ref 0–0.7)
EOSINOPHIL NFR BLD AUTO: 3.8 %
ERYTHROCYTE [DISTWIDTH] IN BLOOD BY AUTOMATED COUNT: 12.7 % (ref 10–15)
GFR SERPL CREATININE-BSD FRML MDRD: 51 ML/MIN/{1.73_M2}
GLUCOSE SERPL-MCNC: 99 MG/DL (ref 70–99)
HCT VFR BLD AUTO: 42.7 % (ref 35–47)
HGB BLD-MCNC: 13 G/DL (ref 11.7–15.7)
IMM GRANULOCYTES # BLD: 0.1 10E9/L (ref 0–0.4)
IMM GRANULOCYTES NFR BLD: 1.2 %
LYMPHOCYTES # BLD AUTO: 1.8 10E9/L (ref 0.8–5.3)
LYMPHOCYTES NFR BLD AUTO: 17.3 %
MCH RBC QN AUTO: 28.4 PG (ref 26.5–33)
MCHC RBC AUTO-ENTMCNC: 30.4 G/DL (ref 31.5–36.5)
MCV RBC AUTO: 93 FL (ref 78–100)
MONOCYTES # BLD AUTO: 0.7 10E9/L (ref 0–1.3)
MONOCYTES NFR BLD AUTO: 7 %
NEUTROPHILS # BLD AUTO: 7.1 10E9/L (ref 1.6–8.3)
NEUTROPHILS NFR BLD AUTO: 69.1 %
NRBC # BLD AUTO: 0 10*3/UL
NRBC BLD AUTO-RTO: 0 /100
PLATELET # BLD AUTO: 530 10E9/L (ref 150–450)
POTASSIUM SERPL-SCNC: 4.8 MMOL/L (ref 3.4–5.3)
RBC # BLD AUTO: 4.58 10E12/L (ref 3.8–5.2)
SODIUM SERPL-SCNC: 140 MMOL/L (ref 133–144)
WBC # BLD AUTO: 10.3 10E9/L (ref 4–11)

## 2019-03-07 PROCEDURE — 85025 COMPLETE CBC W/AUTO DIFF WBC: CPT | Performed by: NURSE PRACTITIONER

## 2019-03-07 PROCEDURE — 99213 OFFICE O/P EST LOW 20 MIN: CPT | Performed by: NURSE PRACTITIONER

## 2019-03-07 PROCEDURE — 80048 BASIC METABOLIC PNL TOTAL CA: CPT | Performed by: NURSE PRACTITIONER

## 2019-03-07 PROCEDURE — 36415 COLL VENOUS BLD VENIPUNCTURE: CPT | Performed by: NURSE PRACTITIONER

## 2019-03-07 NOTE — RESULT ENCOUNTER NOTE
Pathology from EGD reviewed with patient and daughter over the phone. All consistent with candida esophagitis. A benign squamous papilloma was removed and does not need follow up. Clinically feeling better on fluconazole. Plan for repeat EGD in 1 month.     John Perez MD  [facility]  Division of Gastroenterology and Hepatology  [facility] [address] [place], [place] [address]

## 2019-03-07 NOTE — NURSING NOTE
Chief Complaint   Patient presents with     COPD     weak and shakey - wants to f/u on medicine       Initial BP 98/62   Pulse 66   Temp 98.4  F (36.9  C) (Tympanic)   Resp 24   Wt 45.4 kg (100 lb)   BMI 19.53 kg/m   Estimated body mass index is 19.53 kg/m  as calculated from the following:    Height as of 3/1/19: 1.524 m (5').    Weight as of this encounter: 45.4 kg (100 lb).    Patient presents to the clinic using Wheel Chair  Oxygen    Health Maintenance that is potentially due pending provider review:  NONE    n/a    Is there anyone who you would like to be able to receive your results? No  If yes have patient fill out STAR

## 2019-03-07 NOTE — PROGRESS NOTES
SUBJECTIVE:   Tari Wiley is a 74 year old female who presents to clinic today for the following health issues:      COPD     Symptoms are currently: much worse    Current fatigue or dyspnea with ambulation: worsened from baseline    Shortness of breath: slightly worsened    Increased or change in Cough/Sputum: Yes-      Fever(s): No         History   Smoking Status     Former Smoker     Packs/day: 4.00     Years: 36.00     Types: Cigarettes     Quit date: 1993   Smokeless Tobacco     Never Used     Comment: Started at age 12     No results found for: FEV1, BYD5NWI    Amount of exercise or physical activity: None    Problems taking medications regularly: No    Medication side effects: shakey    Diet: regular (no restrictions)    Started feeling bad 3 days after procedure  Weakness, difficult to do daily activities  Breathing at baseline, denies cough or fever  Feels good otherwise  No urinary symptoms.    Problem list and histories reviewed & adjusted, as indicated.  Additional history: as documented    Patient Active Problem List   Diagnosis     Mild major depression (H)     Hyperlipidemia LDL goal <130     Personal history of smoking     Generalized anxiety disorder     Generalized weakness     Anemia     Thrush, oral     Advanced directives, counseling/discussion     Pneumonia     Gastroenteritis     Respiratory failure, acute (H)     COPD (chronic obstructive pulmonary disease) (HCC)     COPD exacerbation (H)     Health Care Home-Not active     Mammogram declined     Past Surgical History:   Procedure Laterality Date     APPENDECTOMY       C/SECTION, LOW TRANSVERSE      , Low Transverse     CATARACT IOL, RT/LT      right-2013     CHOLECYSTECTOMY, OPEN       ESOPHAGOSCOPY, GASTROSCOPY, DUODENOSCOPY (EGD), COMBINED N/A 2019    Procedure: COMBINED ESOPHAGOSCOPY, GASTROSCOPY, DUODENOSCOPY (EGD);  Surgeon: Justin Davila MD;  Location: [facility]     ESOPHAGOSCOPY, GASTROSCOPY,  DUODENOSCOPY (EGD), RESECT MUCOSA, COMBINED N/A 3/1/2019    Procedure: Upper Endoscopy With EMR, and brushings;  Surgeon: John Perez MD;  Location: [facility]     SURGICAL HISTORY OF -   1966    stent to biliary tract       Social History     Tobacco Use     Smoking status: Former Smoker     Packs/day: 4.00     Years: 36.00     Pack years: 144.00     Types: Cigarettes     Last attempt to quit: 1993     Years since quittin.2     Smokeless tobacco: Never Used     Tobacco comment: Started at age 12   Substance Use Topics     Alcohol use: No     Family History   Problem Relation Age of Onset     Alcohol/Drug Father      Cancer Brother          Current Outpatient Medications   Medication Sig Dispense Refill     acetaminophen (TYLENOL) 500 MG tablet Take 2 tablets by mouth every 6 hours as needed.       ADVAIR DISKUS 500-50 MCG/DOSE inhaler INHALE ONE DOSE BY MOUTH EVERY 12 HOURS 3 Inhaler 3     albuterol (2.5 MG/3ML) 0.083% neb solution Take 1 vial (2.5 mg) by nebulization every 4 hours as needed for shortness of breath / dyspnea 1 Box 5     albuterol (PROAIR HFA/PROVENTIL HFA/VENTOLIN HFA) 108 (90 Base) MCG/ACT inhaler Inhale 2 puffs into the lungs every 6 hours 4 Inhaler 3     buPROPion (WELLBUTRIN) 75 MG tablet TAKE TWO TABLETS BY MOUTH TWICE DAILY 360 tablet 3     diphenhydrAMINE-acetaminophen (TYLENOL PM)  MG tablet Take 1-2 tablets by mouth nightly as needed.       LANsoprazole (PREVACID SOLUTAB) 30 MG ODT Take 1 tablet (30 mg) by mouth daily 30 tablet 1     LORazepam (ATIVAN) 0.5 MG tablet TAKE 1 TABLET BY MOUTH EVERY 6 HOURS 20 tablet 0     nystatin (MYCOSTATIN) 506613 UNIT/ML suspension SWISH AND SPIT 5 ML BY MOUTH TWICE DAILY BEFORE MEAL(S) IF THRUSH NOT IMPROVING GO TO FOUR TIMES A DAY FOR 7 DAYS 60 mL 3     OXYGEN-HELIUM IN 2-3 liters       sertraline (ZOLOFT) 50 MG tablet TAKE TWO TABLETS BY MOUTH ONCE DAILY 180 tablet 3     SPIRIVA HANDIHALER 18 MCG capsule INHALE ONE DOSE BY MOUTH ONCE  DAILY 90 capsule 3     azithromycin (ZITHROMAX) 200 MG/5ML suspension Take 12.5 mLs (500 mg) by mouth daily for 1 day, THEN 6.25 mLs (250 mg) daily for 4 days. 37.5 mL 0     Allergies   Allergen Reactions     Sulfa Drugs Swelling and Difficulty breathing     Labs reviewed in EPIC    Reviewed and updated as needed this visit by clinical staff  Tobacco  Allergies  Meds  Med Hx  Surg Hx  Fam Hx  Soc Hx      Reviewed and updated as needed this visit by Provider         ROS:  Constitutional, HEENT, cardiovascular, pulmonary, gi and gu systems are negative, except as otherwise noted.    OBJECTIVE:     BP 98/62   Pulse 66   Temp 98.4  F (36.9  C) (Tympanic)   Resp 24   Wt 45.4 kg (100 lb)   SpO2 91%   BMI 19.53 kg/m    Body mass index is 19.53 kg/m .  GENERAL: healthy, alert and mild distress  NECK: no adenopathy, no asymmetry, masses, or scars and thyroid normal to palpation  RESP: lungs clear to auscultation - no rales, rhonchi or wheezes  CV: regular rate and rhythm, normal S1 S2, no S3 or S4, no murmur, click or rub, no peripheral edema and peripheral pulses strong  ABDOMEN: soft, nontender, no hepatosplenomegaly, no masses and bowel sounds normal  MS: no gross musculoskeletal defects noted, no edema  NEURO: Normal strength and tone, mentation intact and speech normal  PSYCH: mentation appears normal, affect normal/bright    Diagnostic Test Results:  No results found for this or any previous visit (from the past 24 hour(s)).    ASSESSMENT/PLAN:     1. Viral URI  Labs unremarkable.  Suspect that symptoms are related to viral URI.  Jaqueline feels that her breathing is at baseline and declines x ray today.  Monitor symptoms and follow up is symptoms to do not improve any worsening symptoms.    2. Muscle weakness (generalized)  Per above.   - CBC with platelets and differential  - Basic metabolic panel    Home care instructions were reviewed with the patient. The risks, benefits and treatment options of  prescribed medications or other treatments have been discussed with the patient. The patient verbalized their understanding and should call or follow up if no improvement or if they develop further problems.    MARYJANE Klein [facility]

## 2019-03-08 ENCOUNTER — TELEPHONE (OUTPATIENT)
Dept: GASTROENTEROLOGY | Facility: CLINIC | Age: 74
End: 2019-03-08

## 2019-03-08 NOTE — TELEPHONE ENCOUNTER
LVM for patient in regards to scheduled procedure with Dr. Perez. Left direct line for patient to call back and go over scheduling details.     3/8/19 1051am

## 2019-03-11 NOTE — TELEPHONE ENCOUNTER
Spoke to patient in regards to scheduled procedure. Informed patient she is scheduled with Dr. Perez on 4/12/19. Informed patient she will need an updated pre-op physical within 30 days of her procedure. Patient stated she is going to have this done locally with her PCP. Informed patient she will need a  and someone to monitor her for 24 hours after the procedure. Confirmed location of the procedure with the patient. Informed patient all scheduling details will be sent to the address listed on Epic. Address confirmed on this call.     3/11/19 159pm

## 2019-03-15 ENCOUNTER — OFFICE VISIT (OUTPATIENT)
Dept: FAMILY MEDICINE | Facility: CLINIC | Age: 74
End: 2019-03-15
Payer: MEDICARE

## 2019-03-15 VITALS
RESPIRATION RATE: 22 BRPM | BODY MASS INDEX: 19.63 KG/M2 | OXYGEN SATURATION: 90 % | HEIGHT: 60 IN | WEIGHT: 100 LBS | TEMPERATURE: 97.9 F | HEART RATE: 86 BPM | DIASTOLIC BLOOD PRESSURE: 88 MMHG | SYSTOLIC BLOOD PRESSURE: 138 MMHG

## 2019-03-15 DIAGNOSIS — J20.9 ACUTE BRONCHITIS WITH COEXISTING CONDITION REQUIRING PROPHYLACTIC TREATMENT: Primary | ICD-10-CM

## 2019-03-15 DIAGNOSIS — R06.02 SHORTNESS OF BREATH: ICD-10-CM

## 2019-03-15 PROCEDURE — 99213 OFFICE O/P EST LOW 20 MIN: CPT | Performed by: NURSE PRACTITIONER

## 2019-03-15 RX ORDER — AZITHROMYCIN 200 MG/5ML
POWDER, FOR SUSPENSION ORAL
Qty: 37.5 ML | Refills: 0 | Status: SHIPPED | OUTPATIENT
Start: 2019-03-15 | End: 2019-03-15

## 2019-03-15 RX ORDER — AZITHROMYCIN 200 MG/5ML
POWDER, FOR SUSPENSION ORAL
Qty: 37.5 ML | Refills: 0 | Status: SHIPPED | OUTPATIENT
Start: 2019-03-15 | End: 2019-04-10

## 2019-03-15 ASSESSMENT — MIFFLIN-ST. JEOR: SCORE: 880.1

## 2019-03-15 NOTE — PROGRESS NOTES
SUBJECTIVE:   Tari Wiley is a 74 year old female who presents to clinic today for the following health issues:    Acute Illness   Acute illness concerns: cough   Onset: 2018    Fever: no    Chills/Sweats: YES- chills     Headache (location?): no    Sinus Pressure:no    Conjunctivitis:  no    Ear Pain: no    Rhinorrhea: no     Congestion: YES- chest and nasal     Sore Throat: YES- little     Cough: YES-productive of green sputum, with shortness of breath    Wheeze: YES    Decreased Appetite: YES    Nausea: YES- sometimes     Vomiting: no    Diarrhea:  no    Dysuria/Freq.: no    Fatigue/Achiness: YES    Sick/Strep Exposure: no     Therapies Tried and outcome: Patient had been seen in clinic on 3-15-and given  zithromax Patient is still having trouble     Feeling better since starting the Azithromycin  Able to get around easier    Problem list and histories reviewed & adjusted, as indicated.  Additional history: as documented    Patient Active Problem List   Diagnosis     Mild major depression (H)     Hyperlipidemia LDL goal <130     Personal history of smoking     Generalized anxiety disorder     Generalized weakness     Anemia     Thrush, oral     Advanced directives, counseling/discussion     Pneumonia     Gastroenteritis     Respiratory failure, acute (H)     COPD (chronic obstructive pulmonary disease) (HCC)     COPD exacerbation (H)     Health Care Home-Not active     Mammogram declined     Past Surgical History:   Procedure Laterality Date     APPENDECTOMY       C/SECTION, LOW TRANSVERSE      , Low Transverse     CATARACT IOL, RT/LT      right-2013     CHOLECYSTECTOMY, OPEN       ESOPHAGOSCOPY, GASTROSCOPY, DUODENOSCOPY (EGD), COMBINED N/A 2019    Procedure: COMBINED ESOPHAGOSCOPY, GASTROSCOPY, DUODENOSCOPY (EGD);  Surgeon: Justin Davila MD;  Location: [facility]     ESOPHAGOSCOPY, GASTROSCOPY, DUODENOSCOPY (EGD), RESECT MUCOSA, COMBINED N/A 3/1/2019    Procedure: Upper  Endoscopy With EMR, and brushings;  Surgeon: John Perez MD;  Location: [facility] OR     SURGICAL HISTORY OF -   1967    stent to biliary tract       Social History     Tobacco Use     Smoking status: Former Smoker     Packs/day: 4.00     Years: 36.00     Pack years: 144.00     Types: Cigarettes     Last attempt to quit: 1993     Years since quittin.2     Smokeless tobacco: Never Used     Tobacco comment: Started at age 12   Substance Use Topics     Alcohol use: No     Family History   Problem Relation Age of Onset     Alcohol/Drug Father      Cancer Brother          Current Outpatient Medications   Medication Sig Dispense Refill     acetaminophen (TYLENOL) 500 MG tablet Take 2 tablets by mouth every 6 hours as needed.       ADVAIR DISKUS 500-50 MCG/DOSE inhaler INHALE ONE DOSE BY MOUTH EVERY 12 HOURS 3 Inhaler 3     albuterol (2.5 MG/3ML) 0.083% neb solution Take 1 vial (2.5 mg) by nebulization every 4 hours as needed for shortness of breath / dyspnea 1 Box 5     albuterol (PROAIR HFA/PROVENTIL HFA/VENTOLIN HFA) 108 (90 Base) MCG/ACT inhaler Inhale 2 puffs into the lungs every 6 hours 4 Inhaler 3     azithromycin (ZITHROMAX) 200 MG/5ML suspension Take 12.5 mLs (500 mg) by mouth daily for 1 day, THEN 6.25 mLs (250 mg) daily for 4 days. 37.5 mL 0     buPROPion (WELLBUTRIN) 75 MG tablet TAKE TWO TABLETS BY MOUTH TWICE DAILY 360 tablet 3     diphenhydrAMINE-acetaminophen (TYLENOL PM)  MG tablet Take 1-2 tablets by mouth nightly as needed.       LANsoprazole (PREVACID SOLUTAB) 30 MG ODT Take 1 tablet (30 mg) by mouth daily 30 tablet 1     LORazepam (ATIVAN) 0.5 MG tablet TAKE 1 TABLET BY MOUTH EVERY 6 HOURS 20 tablet 0     OXYGEN-HELIUM IN 2-3 liters       sertraline (ZOLOFT) 50 MG tablet TAKE TWO TABLETS BY MOUTH ONCE DAILY 180 tablet 3     SPIRIVA HANDIHALER 18 MCG capsule INHALE ONE DOSE BY MOUTH ONCE DAILY 90 capsule 3     nystatin (MYCOSTATIN) 324530 UNIT/ML suspension SWISH AND SPIT 5 ML BY MOUTH  TWICE DAILY BEFORE MEAL(S) IF THRUSH NOT IMPROVING GO TO FOUR TIMES A DAY FOR 7 DAYS 60 mL 3     Allergies   Allergen Reactions     Sulfa Drugs Swelling and Difficulty breathing     Labs reviewed in EPIC    Reviewed and updated as needed this visit by clinical staff       Reviewed and updated as needed this visit by Provider         ROS:  Constitutional, HEENT, cardiovascular, pulmonary, gi and gu systems are negative, except as otherwise noted.    OBJECTIVE:     BP 92/58   Pulse 123   Temp 98.9  F (37.2  C) (Tympanic)   Resp 14   Ht 1.524 m (5')   Wt 45.4 kg (100 lb)   SpO2 91%   BMI 19.53 kg/m    Body mass index is 19.53 kg/m .  GENERAL: alert and mild distress  NECK: no adenopathy  RESP: decreased breath sounds throughout  CV: regular rate and rhythm, normal S1 S2, no S3 or S4, no murmur, click or rub, no peripheral edema and peripheral pulses strong  ABDOMEN: soft, nontender, no hepatosplenomegaly, no masses and bowel sounds normal  MS: no gross musculoskeletal defects noted, no edema  PSYCH: mentation appears normal, affect normal/bright    Diagnostic Test Results:  Xray -   CHEST TWO VIEWS  3/18/2019 4:08 PM     HISTORY: Exacerbation of chronic obstructive pulmonary disease.    COMPARISON: 9/5/2014      Impression     IMPRESSION: Hyperinflated lungs, no infiltrates, resolution of  previous right upper lobe infiltrate. No pleural effusion, normal  heart size and pulmonary vascularity.    ZARINA MANTILLA MD     ASSESSMENT/PLAN:     1. COPD exacerbation (H)  Stable x ray and symptoms.  Finish Azithromycin and follow up with any worsening or ongoing symptoms.  - XR Chest 2 Views; Future    Home care instructions were reviewed with the patient. The risks, benefits and treatment options of prescribed medications or other treatments have been discussed with the patient. The patient verbalized their understanding and should call or follow up if no improvement or if they develop further problems.      Cynthia WAYNE  MARYJANE Guillen [facility]

## 2019-03-15 NOTE — PROGRESS NOTES
SUBJECTIVE:   Tari Wiley is a 74 year old female who presents to clinic today for the following health issues:    RESPIRATORY SYMPTOMS      Duration: worse x 1 week     Description  cough and wheezing    Severity: moderate    Accompanying signs and symptoms: None    History (predisposing factors):  COPD    Precipitating or alleviating factors: None    Therapies tried and outcome:  none    Weakness and increased shortness of breath with ambulation    Problem list and histories reviewed & adjusted, as indicated.  Additional history: as documented    Patient Active Problem List   Diagnosis     Mild major depression (H)     Hyperlipidemia LDL goal <130     Personal history of smoking     Generalized anxiety disorder     Generalized weakness     Anemia     Thrush, oral     Advanced directives, counseling/discussion     Pneumonia     Gastroenteritis     Respiratory failure, acute (H)     COPD (chronic obstructive pulmonary disease) (HCC)     COPD exacerbation (H)     Health Care Home-Not active     Mammogram declined     Past Surgical History:   Procedure Laterality Date     APPENDECTOMY       C/SECTION, LOW TRANSVERSE      , Low Transverse     CATARACT IOL, RT/LT      right-2013     CHOLECYSTECTOMY, OPEN       ESOPHAGOSCOPY, GASTROSCOPY, DUODENOSCOPY (EGD), COMBINED N/A 2019    Procedure: COMBINED ESOPHAGOSCOPY, GASTROSCOPY, DUODENOSCOPY (EGD);  Surgeon: Justin Davila MD;  Location: [facility]     ESOPHAGOSCOPY, GASTROSCOPY, DUODENOSCOPY (EGD), RESECT MUCOSA, COMBINED N/A 3/1/2019    Procedure: Upper Endoscopy With EMR, and brushings;  Surgeon: John Perez MD;  Location:  OR     SURGICAL HISTORY OF -   1967    stent to biliary tract       Social History     Tobacco Use     Smoking status: Former Smoker     Packs/day: 4.00     Years: 36.00     Pack years: 144.00     Types: Cigarettes     Last attempt to quit: 1993     Years since quittin.2     Smokeless tobacco: Never Used      Tobacco comment: Started at age 12   Substance Use Topics     Alcohol use: No     Family History   Problem Relation Age of Onset     Alcohol/Drug Father      Cancer Brother          Current Outpatient Medications   Medication Sig Dispense Refill     acetaminophen (TYLENOL) 500 MG tablet Take 2 tablets by mouth every 6 hours as needed.       ADVAIR DISKUS 500-50 MCG/DOSE inhaler INHALE ONE DOSE BY MOUTH EVERY 12 HOURS 3 Inhaler 3     albuterol (2.5 MG/3ML) 0.083% neb solution Take 1 vial (2.5 mg) by nebulization every 4 hours as needed for shortness of breath / dyspnea 1 Box 5     albuterol (PROAIR HFA/PROVENTIL HFA/VENTOLIN HFA) 108 (90 Base) MCG/ACT inhaler Inhale 2 puffs into the lungs every 6 hours 4 Inhaler 3     buPROPion (WELLBUTRIN) 75 MG tablet TAKE TWO TABLETS BY MOUTH TWICE DAILY 360 tablet 3     diphenhydrAMINE-acetaminophen (TYLENOL PM)  MG tablet Take 1-2 tablets by mouth nightly as needed.       LANsoprazole (PREVACID SOLUTAB) 30 MG ODT Take 1 tablet (30 mg) by mouth daily 30 tablet 1     LORazepam (ATIVAN) 0.5 MG tablet TAKE 1 TABLET BY MOUTH EVERY 6 HOURS 20 tablet 0     nystatin (MYCOSTATIN) 242756 UNIT/ML suspension SWISH AND SPIT 5 ML BY MOUTH TWICE DAILY BEFORE MEAL(S) IF THRUSH NOT IMPROVING GO TO FOUR TIMES A DAY FOR 7 DAYS 60 mL 3     OXYGEN-HELIUM IN 2-3 liters       sertraline (ZOLOFT) 50 MG tablet TAKE TWO TABLETS BY MOUTH ONCE DAILY 180 tablet 3     SPIRIVA HANDIHALER 18 MCG capsule INHALE ONE DOSE BY MOUTH ONCE DAILY 90 capsule 3     Allergies   Allergen Reactions     Sulfa Drugs Swelling and Difficulty breathing     Labs reviewed in EPIC    Reviewed and updated as needed this visit by clinical staff       Reviewed and updated as needed this visit by Provider         ROS:  Constitutional, HEENT, cardiovascular, pulmonary, gi and gu systems are negative, except as otherwise noted.    OBJECTIVE:     /88 (Cuff Size: Adult Regular)   Pulse 119   Temp 97.9  F (36.6  C)  (Tympanic)   Resp 22   Ht 1.524 m (5')   Wt 45.4 kg (100 lb)   SpO2 90%   BMI 19.53 kg/m    Body mass index is 19.53 kg/m .  GENERAL: alert and mild distress  NECK: no adenopathy  RESP: decreased breath sounds throughout  CV: regular rate and rhythm, normal S1 S2, no S3 or S4, no murmur, click or rub, no peripheral edema and peripheral pulses strong  MS: no gross musculoskeletal defects noted, no edema  PSYCH: mentation appears normal, affect normal/bright    Diagnostic Test Results:  None    ASSESSMENT/PLAN:     1. Acute bronchitis with coexisting condition requiring prophylactic treatment  Mild distress.  With ongoing symptoms will treat with azithromycin.  Follow up in 3 days if no significant improvement, sooner with any worsening symptoms.  - azithromycin (ZITHROMAX) 200 MG/5ML suspension; Take 12.5 mLs (500 mg) by mouth daily for 1 day, THEN 6.25 mLs (250 mg) daily for 4 days.  Dispense: 37.5 mL; Refill: 0    2. Shortness of breath  X ray not available, canceled  - XR Chest 2 Views; Future    Home care instructions were reviewed with the patient. The risks, benefits and treatment options of prescribed medications or other treatments have been discussed with the patient. The patient verbalized their understanding and should call or follow up if no improvement or if they develop further problems.    Patient Instructions   Azithromycin sent to the pharmacy for symptoms    Follow up over the weekend with any worsening symptoms    Recheck on Monday if not improving      MARYJANE Klein [facility]

## 2019-03-15 NOTE — PATIENT INSTRUCTIONS
Azithromycin sent to the pharmacy for symptoms    Follow up over the weekend with any worsening symptoms    Recheck on Monday if not improving

## 2019-03-18 ENCOUNTER — OFFICE VISIT (OUTPATIENT)
Dept: FAMILY MEDICINE | Facility: CLINIC | Age: 74
End: 2019-03-18
Payer: MEDICARE

## 2019-03-18 ENCOUNTER — ANCILLARY PROCEDURE (OUTPATIENT)
Dept: GENERAL RADIOLOGY | Facility: CLINIC | Age: 74
End: 2019-03-18
Attending: NURSE PRACTITIONER
Payer: MEDICARE

## 2019-03-18 VITALS
HEIGHT: 60 IN | RESPIRATION RATE: 14 BRPM | WEIGHT: 100 LBS | TEMPERATURE: 98.9 F | BODY MASS INDEX: 19.63 KG/M2 | HEART RATE: 98 BPM | OXYGEN SATURATION: 91 % | SYSTOLIC BLOOD PRESSURE: 92 MMHG | DIASTOLIC BLOOD PRESSURE: 58 MMHG

## 2019-03-18 DIAGNOSIS — J44.1 COPD EXACERBATION (H): ICD-10-CM

## 2019-03-18 DIAGNOSIS — F41.9 ANXIETY: ICD-10-CM

## 2019-03-18 DIAGNOSIS — J44.1 COPD EXACERBATION (H): Primary | ICD-10-CM

## 2019-03-18 PROCEDURE — 71046 X-RAY EXAM CHEST 2 VIEWS: CPT

## 2019-03-18 PROCEDURE — 99213 OFFICE O/P EST LOW 20 MIN: CPT | Performed by: NURSE PRACTITIONER

## 2019-03-18 ASSESSMENT — MIFFLIN-ST. JEOR: SCORE: 880.1

## 2019-03-19 RX ORDER — LORAZEPAM 0.5 MG/1
TABLET ORAL
Qty: 10 TABLET | Refills: 0 | Status: SHIPPED | OUTPATIENT
Start: 2019-03-19 | End: 2019-04-02

## 2019-03-27 NOTE — TELEPHONE ENCOUNTER
Forms returned via [facility] office mail to Noy Pickens   LIMITED ROM/SWELLING

## 2019-03-28 DIAGNOSIS — J44.9 CHRONIC OBSTRUCTIVE PULMONARY DISEASE, UNSPECIFIED COPD TYPE (H): ICD-10-CM

## 2019-03-28 NOTE — TELEPHONE ENCOUNTER
I am in process of applying to the Spir[employer] Handihaler assistance program through [employer].  [employer] requires a hand signed, brand name, hard copy script be submitted with their application.    Please hand sign a BRAND name, hard copy script for:     SPIRIVA HANDIHALER    Please send the script to me via interoffice mail [employer] Noy Milligan or via [place] mail  at:      [facility]   Noy Pickens   [address] [place]  [address]    Thanks so much for your help!    Noy Pickens  Prescription

## 2019-03-28 NOTE — TELEPHONE ENCOUNTER
"Requested Prescriptions   Pending Prescriptions Disp Refills     SPIRIVA HANDIHALER 18 MCG inhaled capsule 90 capsule 3     Sig: INHALE ONE DOSE BY MOUTH ONCE DAILY    Asthma Maintenance Inhalers - Anticholinergics Passed - 3/28/2019 12:13 PM       Passed - Patient is age 12 years or older       Passed - Recent (12 mo) or future (30 days) visit within the authorizing provider's specialty    Patient had office visit in the last 12 months or has a visit in the next 30 days with authorizing provider or within the authorizing provider's specialty.  See \"Patient Info\" tab in inbasket, or \"Choose Columns\" in Meds & Orders section of the refill encounter.             Passed - Medication is active on med list        Last Written Prescription Date:  2/20/18  Last Fill Quantity: 180,  # refills: 3   Last office visit: 3/18/2019 with prescribing provider:      Future Office Visit:   Next 5 appointments (look out 90 days)    Apr 02, 2019  2:00 PM CDT  Pre-Op physical with Suzy Peters MD  [facility] [address] [place] [id number] [phone]           "

## 2019-03-29 LAB
FUNGUS SPEC CULT: ABNORMAL
SPECIMEN SOURCE: ABNORMAL

## 2019-03-29 RX ORDER — TIOTROPIUM BROMIDE 18 UG/1
CAPSULE ORAL; RESPIRATORY (INHALATION)
Qty: 90 CAPSULE | Refills: 3 | Status: SHIPPED | OUTPATIENT
Start: 2019-03-29 | End: 2020-01-31

## 2019-04-02 ENCOUNTER — OFFICE VISIT (OUTPATIENT)
Dept: FAMILY MEDICINE | Facility: CLINIC | Age: 74
End: 2019-04-02
Payer: MEDICARE

## 2019-04-02 VITALS
SYSTOLIC BLOOD PRESSURE: 124 MMHG | BODY MASS INDEX: 19.63 KG/M2 | RESPIRATION RATE: 24 BRPM | TEMPERATURE: 98.2 F | HEIGHT: 60 IN | OXYGEN SATURATION: 90 % | HEART RATE: 126 BPM | DIASTOLIC BLOOD PRESSURE: 70 MMHG | WEIGHT: 100 LBS

## 2019-04-02 DIAGNOSIS — J44.9 CHRONIC OBSTRUCTIVE PULMONARY DISEASE, UNSPECIFIED COPD TYPE (H): ICD-10-CM

## 2019-04-02 DIAGNOSIS — K22.2 ESOPHAGEAL STENOSIS: ICD-10-CM

## 2019-04-02 DIAGNOSIS — F41.1 GENERALIZED ANXIETY DISORDER: ICD-10-CM

## 2019-04-02 DIAGNOSIS — F32.0 MILD MAJOR DEPRESSION (H): ICD-10-CM

## 2019-04-02 DIAGNOSIS — Z01.818 PREOP GENERAL PHYSICAL EXAM: Primary | ICD-10-CM

## 2019-04-02 DIAGNOSIS — B37.81 ESOPHAGEAL CANDIDIASIS (H): ICD-10-CM

## 2019-04-02 PROCEDURE — 99214 OFFICE O/P EST MOD 30 MIN: CPT | Performed by: FAMILY MEDICINE

## 2019-04-02 RX ORDER — OMEPRAZOLE 40 MG/1
20 CAPSULE, DELAYED RELEASE ORAL DAILY
COMMUNITY
Start: 2019-03-01 | End: 2020-01-27

## 2019-04-02 RX ORDER — ALPRAZOLAM 0.25 MG
0.25 TABLET ORAL 3 TIMES DAILY PRN
Qty: 15 TABLET | Refills: 0 | Status: SHIPPED | OUTPATIENT
Start: 2019-04-02 | End: 2019-04-02

## 2019-04-02 RX ORDER — LORAZEPAM 0.5 MG/1
TABLET ORAL
Qty: 20 TABLET | Refills: 0 | Status: SHIPPED | OUTPATIENT
Start: 2019-04-02 | End: 2019-05-13

## 2019-04-02 ASSESSMENT — MIFFLIN-ST. JEOR: SCORE: 880.1

## 2019-04-02 NOTE — PROGRESS NOTES
[facility]  [address] [place] [id number]  [phone]  Dept: [phone]    PRE-OP EVALUATION:  Today's date: 2019    Tari Wiley (: 1945) presents for pre-operative evaluation assessment as requested by Dr. Perez.  She requires evaluation and anesthesia risk assessment prior to undergoing surgery/procedure for treatment of throat problem .    Fax number for surgical facility: [facility] Chart  Primary Physician: Suzy Peters  Type of Anesthesia Anticipated: General    Patient has a Health Care Directive or Living Will:  NO    Preop Questions 2019   Who is doing your surgery? yao perez   What are you having done? upper endo with possible dialation   Date of Surgery/Procedure: [id number]   Facility or Hospital where procedure/surgery will be performed[facility]   1.  Do you have a history of Heart attack, stroke, stent, coronary bypass surgery, or other heart surgery? No   2.  Do you ever have any pain or discomfort in your chest? No   3.  Do you have a history of  Heart Failure? No   4.   Are you troubled by shortness of breath when:  walking on a level surface, or up a slight hill, or at night? YES - due COPD   5.  Do you currently have a cold, bronchitis or other respiratory infection? No   6.  Do you have a cough, shortness of breath, or wheezing? YES - COPD    7.  Do you sometimes get pains in the calves of your legs when you walk? No   8. Do you or anyone in your family have previous history of blood clots? No   9.  Do you or does anyone in your family have a serious bleeding problem such as prolonged bleeding following surgeries or cuts? No   10. Have you ever had problems with anemia or been told to take iron pills? No   11. Have you had any abnormal blood loss such as black, tarry or bloody stools, or abnormal vaginal bleeding? No   12. Have you ever had a blood transfusion? YES -    13. Have you or any of your relatives ever had problems with  anesthesia? No   14. Do you have sleep apnea, excessive snoring or daytime drowsiness? YES -    15. Do you have any prosthetic heart valves? No   16. Do you have prosthetic joints? No   17. Is there any chance that you may be pregnant? No         HPI:     HPI related to upcoming procedure: recent egd for esoph stenosis that was dilated and found to have candida infection this has been treated and now will have another scope to ensure dilation has worked and candida is resolved       COPD - Patient has a longstanding history of severe COPD oxygen dependent . Patient has been doing well overall noting SOB, RODRIGUES and HOARSENESS and continues on medication regimen consisting of mulitple inhalers  without adverse reactions or side effects.                                                                                                         .  DEPRESSION - Patient has a long history of Depression of moderate severity requiring medication for control with recent symptoms being gradually worsening..Current symptoms of depression include depressed mood, hopelessness, anxiety.                                                                                                                                                                                    .    MEDICAL HISTORY:     Patient Active Problem List    Diagnosis Date Noted     Respiratory failure, acute (H) 05/30/2012     Priority: High     Pneumonia 05/28/2012     Priority: High     Esophageal candidiasis (H) 04/02/2019     Priority: Medium     Esophageal stenosis 04/02/2019     Priority: Medium     Mammogram declined 02/28/2017     Priority: Medium     Health Care Home-Not active 09/08/2014     Priority: Medium     State Tier Level:    Status:  Unable to reach  Care Coordinator:  Talita Peña    See Letters for [facility] Care Plan  Date:  September 8, 2014           Gastroenteritis 05/28/2012     Priority: Medium     Advanced directives, counseling/discussion 02/10/2012      Priority: Medium     POLST form updated:  DNR/DNI, OK to treat in ED or admit, OK to treat with IV antibiotics, oral nutrition only.  2012  Anton Goins CNP (Ann), Palliative Care         Thrush, oral 2011     Priority: Medium     Anemia 2011     Priority: Medium     Generalized weakness 11/10/2011     Priority: Medium     Personal history of smoking 2010     Priority: Medium     Hyperlipidemia LDL goal <130 10/31/2010     Priority: Medium     Mild major depression (H) 06/10/2005     Priority: Medium     2007 doing well on the current dose of Zoloft.        COPD (chronic obstructive pulmonary disease) (HCC) 06/10/2005     Priority: Medium     COPD exacerbation (H) 06/10/2005     Priority: Medium     Generalized anxiety disorder 2011     Priority: Low     Diagnosis updated by automated process. Provider to review and confirm.        Past Medical History:   Diagnosis Date     ROE (generalised anxiety disorder)      Hyperlipidemia LDL goal < 130      Tobacco abuse      Past Surgical History:   Procedure Laterality Date     APPENDECTOMY       C/SECTION, LOW TRANSVERSE  1982    , Low Transverse     CATARACT IOL, RT/LT      right-2013     CHOLECYSTECTOMY, OPEN  1967     ESOPHAGOSCOPY, GASTROSCOPY, DUODENOSCOPY (EGD), COMBINED N/A 2019    Procedure: COMBINED ESOPHAGOSCOPY, GASTROSCOPY, DUODENOSCOPY (EGD);  Surgeon: Justin Davila MD;  Location: [facility]     ESOPHAGOSCOPY, GASTROSCOPY, DUODENOSCOPY (EGD), RESECT MUCOSA, COMBINED N/A 3/1/2019    Procedure: Upper Endoscopy With EMR, and brushings;  Surgeon: John Perez MD;  Location: [facility] OR     SURGICAL HISTORY OF -       stent to biliary tract     Current Outpatient Medications   Medication Sig Dispense Refill     acetaminophen (TYLENOL) 500 MG tablet Take 2 tablets by mouth every 6 hours as needed.       ADVAIR DISKUS 500-50 MCG/DOSE inhaler INHALE ONE DOSE BY MOUTH EVERY 12 HOURS 3 Inhaler 3     albuterol  (2.5 MG/3ML) 0.083% neb solution Take 1 vial (2.5 mg) by nebulization every 4 hours as needed for shortness of breath / dyspnea 1 Box 5     albuterol (PROAIR HFA/PROVENTIL HFA/VENTOLIN HFA) 108 (90 Base) MCG/ACT inhaler Inhale 2 puffs into the lungs every 6 hours (Patient taking differently: Inhale 2 puffs into the lungs every 6 hours as needed ) 4 Inhaler 3     buPROPion (WELLBUTRIN) 75 MG tablet TAKE TWO TABLETS BY MOUTH TWICE DAILY 360 tablet 3     diphenhydrAMINE-acetaminophen (TYLENOL PM)  MG tablet Take 1-2 tablets by mouth nightly as needed.       LORazepam (ATIVAN) 0.5 MG tablet TAKE 1 TABLET BY MOUTH EVERY 6 HOURS 20 tablet 0     nystatin (MYCOSTATIN) 250136 UNIT/ML suspension SWISH AND SPIT 5 ML BY MOUTH TWICE DAILY BEFORE MEAL(S) IF THRUSH NOT IMPROVING GO TO FOUR TIMES A DAY FOR 7 DAYS 60 mL 3     OXYGEN-HELIUM IN 2-3 liters       sertraline (ZOLOFT) 50 MG tablet TAKE TWO TABLETS BY MOUTH ONCE DAILY 180 tablet 3     SPIRIVA HANDIHALER 18 MCG inhaled capsule INHALE ONE DOSE BY MOUTH ONCE DAILY 90 capsule 3     omeprazole (PRILOSEC) 40 MG DR capsule Take 20 mg by mouth daily       OTC products: vit D, and multivit    Allergies   Allergen Reactions     Sulfa Drugs Swelling and Difficulty breathing      Latex Allergy: NO    Social History     Tobacco Use     Smoking status: Former Smoker     Packs/day: 4.00     Years: 36.00     Pack years: 144.00     Types: Cigarettes     Last attempt to quit: 1993     Years since quittin.3     Smokeless tobacco: Never Used     Tobacco comment: Started at age 12   Substance Use Topics     Alcohol use: No     History   Drug Use No       REVIEW OF SYSTEMS:   CONSTITUTIONAL: NEGATIVE for fever, chills, change in weight  INTEGUMENTARY/SKIN: NEGATIVE for worrisome rashes, moles or lesions  EYES: NEGATIVE for vision changes or irritation  ENT/MOUTH: NEGATIVE for ear, mouth and throat problems  BREAST: NEGATIVE for masses, tenderness or discharge  CV: NEGATIVE for  chest pain, palpitations or peripheral edema  GI: NEGATIVE for nausea, abdominal pain, heartburn, or change in bowel habits  : NEGATIVE for frequency, dysuria, or hematuria  MUSCULOSKELETAL: NEGATIVE for significant arthralgias or myalgia  NEURO: NEGATIVE for weakness, dizziness or paresthesias  ENDOCRINE: NEGATIVE for temperature intolerance, skin/hair changes  HEME: NEGATIVE for bleeding problems    EXAM:   /70 (BP Location: Right arm, Patient Position: Chair, Cuff Size: Adult Regular)   Pulse 126   Temp 98.2  F (36.8  C) (Tympanic)   Resp 24   Ht 1.524 m (5')   Wt 45.4 kg (100 lb)   LMP  (LMP Unknown)   SpO2 90%   BMI 19.53 kg/m      GENERAL APPEARANCE: healthy, alert and no distress     EYES: EOMI, PERRL     HENT: ear canals and TM's normal and nose and mouth without ulcers or lesions     NECK: no adenopathy, no asymmetry, masses, or scars and thyroid normal to palpation     RESP: lungs clear to auscultation - no rales, rhonchi or wheezes     CV: regular rates and rhythm, normal S1 S2, no S3 or S4 and no murmur, click or rub     ABDOMEN:  soft, nontender, no HSM or masses and bowel sounds normal     MS: extremities normal- no gross deformities noted, no evidence of inflammation in joints, FROM in all extremities.     SKIN: no suspicious lesions or rashes     NEURO: Normal strength and tone, sensory exam grossly normal, mentation intact and speech normal     PSYCH: mentation appears normal. and affect normal/bright     LYMPHATICS: No cervical adenopathy    DIAGNOSTICS:       Recent Labs   Lab Test 03/07/19  1523 02/26/19  1518   HGB 13.0 12.8   * 503*    141   POTASSIUM 4.8 4.5   CR 1.08* 0.99        IMPRESSION:   Reason for surgery/procedure: Esophageal stenosis with candidiasis     The proposed surgical procedure is considered INTERMEDIATE risk.    REVISED CARDIAC RISK INDEX  The patient has the following serious cardiovascular risks for perioperative complications such as (MI, PE,  VFib and 3  AV Block):  No serious cardiac risks  INTERPRETATION: 0 risks: Class I (very low risk - 0.4% complication rate)    The patient has the following additional risks for perioperative complications:  Oxygen dependent lung disease      ICD-10-CM    1. Preop general physical exam Z01.818    2. Esophageal stenosis K22.2    3. Esophageal candidiasis (H) B37.81    4. Generalized anxiety disorder F41.1 LORazepam (ATIVAN) 0.5 MG tablet     DISCONTINUED: ALPRAZolam (XANAX) 0.25 MG tablet   5. Mild major depression (H) F32.0    6. Chronic obstructive pulmonary disease, unspecified COPD type (H) J44.9        RECOMMENDATIONS:       Pulmonary Risk  Incentive spirometry post op  Respiratory Therapy (Respiratory Care IP Consult)  post op  Maximize COPD treatment current inhalers        --Patient is to take all scheduled medications on the day of surgery EXCEPT for modifications listed below.    APPROVAL GIVEN to proceed with proposed procedure, without further diagnostic evaluation     NOTE pt is FULL CODE new polst was completed today with patient and family     Signed Electronically by: Suzy Peters MD    Copy of this evaluation report is provided to requesting physician.    [facility] Preop Guidelines    Revised Cardiac Risk Index

## 2019-04-02 NOTE — PATIENT INSTRUCTIONS
Before Your Surgery      Call your surgeon if there is any change in your health. This includes signs of a cold or flu (such as a sore throat, runny nose, cough, rash or fever).    Do not smoke, drink alcohol or take over the counter medicine (unless your surgeon or primary care doctor tells you to) for the 24 hours before and after surgery.    If you take prescribed drugs: Follow your doctor s orders about which medicines to take and which to stop until after surgery.    Eating and drinking prior to surgery: follow the instructions from your surgeon    Take a shower or bath the night before surgery. Use the soap your surgeon gave you to gently clean your skin. If you do not have soap from your surgeon, use your regular soap. Do not shave or scrub the surgery site.  Wear clean pajamas and have clean sheets on your bed.       Increase sertraline to 150 mg daily (3 tabs)     Use the ativan for anxiety sooner do not wait

## 2019-04-03 ENCOUNTER — TELEPHONE (OUTPATIENT)
Dept: FAMILY MEDICINE | Facility: CLINIC | Age: 74
End: 2019-04-03

## 2019-04-03 NOTE — TELEPHONE ENCOUNTER
Saige has been approved to the [employer] assistance program for Advair Diskus & Ventolin HFA through December 2019.  She will receive this medication at no cost through the enrollment period.    A 90 day supply of ADVAIR DISKUS & VENTOLIN HFA will be delivered to Saige's home within 7-10 business days.  Saige has been informed of this approval.    Saige will contact my office for refills as we must work directly with the .  I will note EPIC as each refill is requested.    Thanks so much for your help!    Noy Pickens  Prescription

## 2019-04-08 NOTE — TELEPHONE ENCOUNTER
FUTURE VISIT INFORMATION      FUTURE VISIT INFORMATION:    Date: 5/3/19    Time: 12:20pm    Location: [facility]  REFERRAL INFORMATION:    Referring provider:  Dr. Justin Davila    Referring providers clinic:  [facility]    Reason for visit/diagnosis:  Dysphagia    NOTES STATUS DETAILS   OFFICE NOTE from referring provider  Internal N/A   OFFICE NOTE from other specialist   Internal 4/2/19, 1/18/19   DISCHARGE SUMMARY FROM HOSPITAL Internal 3/1/19, 1/28/19   DISCHARGE REPORT FROM ED N/A    OPERATIVE REPORT  N/A    PFC REPORT N/A    MEDICATION LIST Internal    LABS     FIT/STOOL TESTING N/A    PERTINENT LABS N/A    PATHOLOGY REPORTS RELATED TO DIAGNOSIS Internal 3/1/19   DIAGNOSTIC PROCEDURES     COLONOSCOPY N/A    ENDOSCOPY (EGD) Internal 3/1/19, 1/28/19   ERCP N/A    EUS N/A    FLEX SIGMOIDOSCOPY N/A    IMAGING & REPORT      CT, MRI, US, XR Internal

## 2019-04-11 ENCOUNTER — TELEPHONE (OUTPATIENT)
Dept: FAMILY MEDICINE | Facility: CLINIC | Age: 74
End: 2019-04-11

## 2019-04-11 ENCOUNTER — PATIENT OUTREACH (OUTPATIENT)
Dept: GASTROENTEROLOGY | Facility: CLINIC | Age: 74
End: 2019-04-11

## 2019-04-11 ENCOUNTER — ANESTHESIA EVENT (OUTPATIENT)
Dept: SURGERY | Facility: CLINIC | Age: 74
End: 2019-04-11
Payer: MEDICARE

## 2019-04-11 DIAGNOSIS — B37.0 THRUSH: ICD-10-CM

## 2019-04-11 DIAGNOSIS — J44.1 COPD EXACERBATION (H): ICD-10-CM

## 2019-04-11 RX ORDER — NYSTATIN 100000/ML
SUSPENSION, ORAL (FINAL DOSE FORM) ORAL
Qty: 60 ML | Refills: 3 | Status: SHIPPED | OUTPATIENT
Start: 2019-04-11 | End: 2019-10-28

## 2019-04-11 RX ORDER — AZITHROMYCIN 250 MG/1
TABLET, FILM COATED ORAL
Qty: 6 TABLET | Refills: 0 | Status: SHIPPED | OUTPATIENT
Start: 2019-04-11 | End: 2019-05-13

## 2019-04-11 RX ORDER — PREDNISONE 5 MG/1
40 TABLET ORAL DAILY
Qty: 40 TABLET | Refills: 0 | Status: SHIPPED | OUTPATIENT
Start: 2019-04-11 | End: 2019-05-13

## 2019-04-11 ASSESSMENT — COPD QUESTIONNAIRES
COPD: 1
CAT_SEVERITY: SEVERE

## 2019-04-11 ASSESSMENT — LIFESTYLE VARIABLES: TOBACCO_USE: 1

## 2019-04-11 NOTE — ANESTHESIA PREPROCEDURE EVALUATION
Anesthesia Pre-Procedure Evaluation    Patient: Tari Wiley   MRN:     [id number] Gender:   female   Age:    74 year old :      1945        Preoperative Diagnosis: Esophageal Stricture   Procedure(s):  Upper Endoscopy With Possible Dilation     Past Medical History:   Diagnosis Date     ROE (generalised anxiety disorder)      Hyperlipidemia LDL goal < 130      Tobacco abuse       Past Surgical History:   Procedure Laterality Date     APPENDECTOMY       C/SECTION, LOW TRANSVERSE  1982    , Low Transverse     CATARACT IOL, RT/LT      right-2013     CHOLECYSTECTOMY, OPEN       ESOPHAGOSCOPY, GASTROSCOPY, DUODENOSCOPY (EGD), COMBINED N/A 2019    Procedure: COMBINED ESOPHAGOSCOPY, GASTROSCOPY, DUODENOSCOPY (EGD);  Surgeon: Justin Davila MD;  Location: [facility]     ESOPHAGOSCOPY, GASTROSCOPY, DUODENOSCOPY (EGD), RESECT MUCOSA, COMBINED N/A 3/1/2019    Procedure: Upper Endoscopy With EMR, and brushings;  Surgeon: John Perez MD;  Location: [facility] OR     SURGICAL HISTORY OF -       stent to biliary tract          Anesthesia Evaluation     . Pt has had prior anesthetic. Type: MAC and General    No history of anesthetic complications          ROS/MED HX    ENT/Pulmonary: Comment: H/o acute respiratory failure    (+)tobacco use, Past use severe COPD, O2 dependent, during Both 2 liters/min,  , recent URI . .    Neurologic:  - neg neurologic ROS     Cardiovascular:  - neg cardiovascular ROS   (+) Dyslipidemia, ----. : . . . :. . Previous cardiac testing date:results:date: results:ECG reviewed date: results: date: results:          METS/Exercise Tolerance:  1 - Eating, dressing   Hematologic:  - neg hematologic  ROS       Musculoskeletal:  - neg musculoskeletal ROS       GI/Hepatic:     (+) Other GI/Hepatic Desphagia with esoph. stricture      Renal/Genitourinary:  - ROS Renal section negative       Endo:  - neg endo ROS       Psychiatric:     (+) psychiatric history depression and anxiety       Infectious Disease:  - neg infectious disease ROS       Malignancy:      - no malignancy   Other:    (+) C-spine cleared: No, no H/O Chronic Pain,other significant disability Other (comment)                       PHYSICAL EXAM:   Mental Status/Neuro: A/A/O   Airway:   Mallampati: II  Mouth/Opening: Full  TM distance: > 6 cm  Neck ROM: Full   Respiratory: Auscultation: Wheezing      CV: Rhythm: Regular  Rate: Age appropriate  Heart: Normal Sounds   Comments:      Dental:  Dental Comments: edentulous                Lab Results   Component Value Date    WBC 10.3 03/07/2019    HGB 13.0 03/07/2019    HCT 42.7 03/07/2019     (H) 03/07/2019    SED 15 02/20/2009     03/07/2019    POTASSIUM 4.8 03/07/2019    CHLORIDE 102 03/07/2019    CO2 32 03/07/2019    BUN 26 03/07/2019    CR 1.08 (H) 03/07/2019    GLC 99 03/07/2019    DENIZ 9.3 03/07/2019    PHOS 2.9 06/02/2012    MAG 1.8 06/02/2012    ALBUMIN 2.9 (L) 09/04/2014    PROTTOTAL 6.8 09/04/2014    ALT 14 09/04/2014    AST 15 09/04/2014    ALKPHOS 128 09/04/2014    BILITOTAL 0.3 09/04/2014    LIPASE 94 09/24/2006    AMYLASE 93 09/24/2006    PTT 28 10/21/2003    INR 0.99 10/21/2003    TSH 2.65 02/20/2009       Preop Vitals  BP Readings from Last 3 Encounters:   04/02/19 124/70   03/18/19 92/58   03/15/19 138/88    Pulse Readings from Last 3 Encounters:   04/02/19 126   03/18/19 98   03/15/19 86      Resp Readings from Last 3 Encounters:   04/02/19 24   03/18/19 14   03/15/19 22    SpO2 Readings from Last 3 Encounters:   04/02/19 90%   03/18/19 91%   03/15/19 90%      Temp Readings from Last 1 Encounters:   04/02/19 36.8  C (98.2  F) (Tympanic)    Ht Readings from Last 1 Encounters:   04/02/19 1.524 m (5')      Wt Readings from Last 1 Encounters:   04/02/19 45.4 kg (100 lb)    Estimated body mass index is 19.53 kg/m  as calculated from the following:    Height as of 4/2/19: 1.524 m (5').    Weight as of 4/2/19: 45.4 kg (100 lb).     LDA:            Assessment:    ASA SCORE: 4    NPO Status: > 2 hours since completed Clear Liquids; > 6 hours since completed Solid Foods   Documentation: H&P complete     Tobacco Use:  NO Active use of Tobacco/UNKNOWN Tobacco use status     Plan:   Anes. Type:  MAC   Pre-Induction: Midazolam IV   Induction:  IV (Standard)   Airway: Oral ETT   Access/Monitoring: PIV   Maintenance: Balanced   Emergence: Procedure Site   Logistics: Same Day Surgery     Postop Pain/Sedation Strategy:  Standard-Options: Opioids PRN     PONV Management:  Adult Risk Factors: Female, Non-Smoker, Postop Opioids  Prevention: Ondansetron     CONSENT: Direct conversation   Plan and risks discussed with: Patient   Blood Products: Consented (ALL Blood Products)       Comments for Plan/Consent:  Discussed with patient that he is at significantly increased risk for this procedure. He wishes to proceed despite the risks.                          Duane F. Szczepanski, MD

## 2019-04-11 NOTE — TELEPHONE ENCOUNTER
Has had thrush for 2 days , mouth is really sore wants refill Nystatin. They cancelled her surgery for tomorrow because of the thrush and she is really upset.     Also has thick green and beige sputum, would like rx for antibiotic and prednisone she usually gets    Saige says if she gets rid of the thrush and stays healthy they can do surgery in 10 days otherwise has to wait until May

## 2019-04-11 NOTE — PROGRESS NOTES
[facility] RN Care Coordination Note:    Received a note back from Dr. Perez that we will reschedule her procedure and ask that her she schedule a follow up with her PCP to further address her concerns of possible pneumonia and discuss alternative treatment for her thrush. She verbalized understanding and will await a call from scheduling to reschedule procedure.     Maria Guadalupe Lamb RN   Care Coordinator   [phone]

## 2019-04-11 NOTE — TELEPHONE ENCOUNTER
Saige is calling wanting a call back about her thrush.  She is scheduled for surgery tomorrow and Dr. Peters told her to call in if she had any problems.    [facility]  [facility]

## 2019-04-11 NOTE — PROGRESS NOTES
[facility] RN Care Coordination Note:    Patient called and stated that she has thrush that is ongoing and she feels is worse. She is currently being treated with nystatin and she is swishing and spitting. She also stated she is having issues with increased cough and concerned she could be coming down with pneumonia again. She reports she got pneumonia after her last procedure.     Informed her I would review this with Dr. Perez and get back to her if we need to reschedule. She verbalized understanding and will await my call.      Maria Guadalupe Lamb RN   Care Coordinator   [phone]

## 2019-04-12 ENCOUNTER — ANESTHESIA (OUTPATIENT)
Dept: SURGERY | Facility: CLINIC | Age: 74
End: 2019-04-12
Payer: MEDICARE

## 2019-04-16 ENCOUNTER — DOCUMENTATION ONLY (OUTPATIENT)
Dept: OTHER | Facility: CLINIC | Age: 74
End: 2019-04-16

## 2019-04-17 ENCOUNTER — TELEPHONE (OUTPATIENT)
Dept: FAMILY MEDICINE | Facility: CLINIC | Age: 74
End: 2019-04-17

## 2019-04-17 NOTE — TELEPHONE ENCOUNTER
Saige has been approved to the [employer] assistance program for Spiriva Handihaler through December 2019.  She will receive this medication at no cost through the enrollment period.    A 90 day supply of SPIRIVA HANDIHALER will be delivered to Saige's home within 7-10 business days.  Saige has been informed of this approval.    Saige will contact my office for refills as we must work directly with the .  I will note EPIC as each refill is requested.    Thanks so much for your help!    Noy Pickens  Prescription

## 2019-04-29 ENCOUNTER — TELEPHONE (OUTPATIENT)
Dept: GASTROENTEROLOGY | Facility: CLINIC | Age: 74
End: 2019-04-29

## 2019-04-29 NOTE — TELEPHONE ENCOUNTER
Spoke to patient reminding of appointment scheduled on 5/3/19 at 1220 with \[facility]"" [facility]. Patient request to cancel and she will call back to reschedule on another day.     EDWIN Trujillo

## 2019-05-03 ENCOUNTER — PRE VISIT (OUTPATIENT)
Dept: GASTROENTEROLOGY | Facility: CLINIC | Age: 74
End: 2019-05-03

## 2019-05-13 ENCOUNTER — ANCILLARY PROCEDURE (OUTPATIENT)
Dept: GENERAL RADIOLOGY | Facility: CLINIC | Age: 74
End: 2019-05-13
Attending: NURSE PRACTITIONER
Payer: MEDICARE

## 2019-05-13 ENCOUNTER — OFFICE VISIT (OUTPATIENT)
Dept: FAMILY MEDICINE | Facility: CLINIC | Age: 74
End: 2019-05-13
Payer: MEDICARE

## 2019-05-13 VITALS
BODY MASS INDEX: 20.42 KG/M2 | TEMPERATURE: 99.6 F | HEIGHT: 60 IN | OXYGEN SATURATION: 90 % | WEIGHT: 104 LBS | DIASTOLIC BLOOD PRESSURE: 62 MMHG | RESPIRATION RATE: 18 BRPM | SYSTOLIC BLOOD PRESSURE: 118 MMHG | HEART RATE: 121 BPM

## 2019-05-13 DIAGNOSIS — Z01.818 PREOP GENERAL PHYSICAL EXAM: Primary | ICD-10-CM

## 2019-05-13 DIAGNOSIS — J44.9 CHRONIC OBSTRUCTIVE PULMONARY DISEASE, UNSPECIFIED COPD TYPE (H): ICD-10-CM

## 2019-05-13 DIAGNOSIS — F32.0 MAJOR DEPRESSIVE DISORDER, SINGLE EPISODE, MILD (H): ICD-10-CM

## 2019-05-13 DIAGNOSIS — B37.81 ESOPHAGEAL CANDIDIASIS (H): ICD-10-CM

## 2019-05-13 DIAGNOSIS — F41.1 GENERALIZED ANXIETY DISORDER: ICD-10-CM

## 2019-05-13 DIAGNOSIS — Z01.818 PREOP GENERAL PHYSICAL EXAM: ICD-10-CM

## 2019-05-13 DIAGNOSIS — K22.2 ESOPHAGEAL STENOSIS: ICD-10-CM

## 2019-05-13 LAB
ERYTHROCYTE [DISTWIDTH] IN BLOOD BY AUTOMATED COUNT: 13.1 % (ref 10–15)
HCT VFR BLD AUTO: 44.3 % (ref 35–47)
HGB BLD-MCNC: 13.5 G/DL (ref 11.7–15.7)
MCH RBC QN AUTO: 28.5 PG (ref 26.5–33)
MCHC RBC AUTO-ENTMCNC: 30.5 G/DL (ref 31.5–36.5)
MCV RBC AUTO: 94 FL (ref 78–100)
PLATELET # BLD AUTO: 437 10E9/L (ref 150–450)
RBC # BLD AUTO: 4.74 10E12/L (ref 3.8–5.2)
WBC # BLD AUTO: 11.1 10E9/L (ref 4–11)

## 2019-05-13 PROCEDURE — 36415 COLL VENOUS BLD VENIPUNCTURE: CPT | Performed by: NURSE PRACTITIONER

## 2019-05-13 PROCEDURE — 85027 COMPLETE CBC AUTOMATED: CPT | Performed by: NURSE PRACTITIONER

## 2019-05-13 PROCEDURE — 99214 OFFICE O/P EST MOD 30 MIN: CPT | Performed by: NURSE PRACTITIONER

## 2019-05-13 PROCEDURE — 71046 X-RAY EXAM CHEST 2 VIEWS: CPT

## 2019-05-13 RX ORDER — LORAZEPAM 0.5 MG/1
0.5 TABLET ORAL EVERY 6 HOURS PRN
Qty: 20 TABLET | Refills: 3 | Status: SHIPPED | OUTPATIENT
Start: 2019-05-13 | End: 2019-05-29

## 2019-05-13 ASSESSMENT — MIFFLIN-ST. JEOR: SCORE: 898.24

## 2019-05-13 NOTE — PATIENT INSTRUCTIONS
Before Your Surgery      Call your surgeon if there is any change in your health. This includes signs of a cold or flu (such as a sore throat, runny nose, cough, rash or fever).    Do not smoke, drink alcohol or take over the counter medicine (unless your surgeon or primary care doctor tells you to) for the 24 hours before and after surg    caroline.    If you take prescribed drugs: Follow your doctor s orders about which medicines to take and which to stop until after surgery.    Eating and drinking prior to surgery: follow the instructions from your surgeon    Take a shower or bath the night before surgery. Use the soap your surgeon gave you to gently clean your skin. If you do not have soap from your surgeon, use your regular soap. Do not shave or scrub the surgery site.  Wear clean pajamas and have clean sheets on your bed.

## 2019-05-13 NOTE — PROGRESS NOTES
[facility]  [address] [place] [id number]  [phone]  Dept: [phone]    PRE-OP EVALUATION:  Today's date: 2019    Tari Wiley (: 1945) presents for pre-operative evaluation assessment as requested by Dr. Perez.  She requires evaluation and anesthesia risk assessment prior to undergoing surgery/procedure for treatment of throat problem .    Fax number for surgical facility: available electronically   Primary Physician: Suzy Peters  Type of Anesthesia Anticipated: General    Patient has a Health Care Directive or Living Will:  YES     Preop Questions 2019   Who is doing your surgery? Dr. Saige Wiley   What are you having done? gastro   Date of Surgery/Procedure: 2019   Facility or Hospital where procedure/surgery will be performed: [facility]   1.  Do you have a history of Heart attack, stroke, stent, coronary bypass surgery, or other heart surgery? No   2.  Do you ever have any pain or discomfort in your chest? No   3.  Do you have a history of  Heart Failure? No   4.   Are you troubled by shortness of breath when:  walking on a level surface, or up a slight hill, or at night? YES - due to COPD   5.  Do you currently have a cold, bronchitis or other respiratory infection? No   6.  Do you have a cough, shortness of breath, or wheezing? No   7.  Do you sometimes get pains in the calves of your legs when you walk? No   8. Do you or anyone in your family have previous history of blood clots? No   9.  Do you or does anyone in your family have a serious bleeding problem such as prolonged bleeding following surgeries or cuts? No   10. Have you ever had problems with anemia or been told to take iron pills? No   11. Have you had any abnormal blood loss such as black, tarry or bloody stools, or abnormal vaginal bleeding? No   12. Have you ever had a blood transfusion? No   13. Have you or any of your relatives ever had problems with anesthesia? No    14. Do you have sleep apnea, excessive snoring or daytime drowsiness? YES - snoring, daytime drowsiness   15. Do you have any prosthetic heart valves? No   16. Do you have prosthetic joints? No   17. Is there any chance that you may be pregnant? No       HPI:     HPI related to upcoming procedure: history of esophagus stenosis s/p dilation and significant candida infection now in need of repeat scope to ensure resolution of candida infection and dilation       COPD - Patient has a longstanding history of moderate-severe COPD . Patient has been doing well overall noting NO SYMPTOMS and continues on medication regimen consisting of multiple inhalers without adverse reactions or side effects.                                                                                                         .    MEDICAL HISTORY:     Patient Active Problem List    Diagnosis Date Noted     Respiratory failure, acute (H) 05/30/2012     Priority: High     Pneumonia 05/28/2012     Priority: High     Esophageal candidiasis (H) 04/02/2019     Priority: Medium     Esophageal stenosis 04/02/2019     Priority: Medium     Mammogram declined 02/28/2017     Priority: Medium     Health Care Home-Not active 09/08/2014     Priority: Medium     State Tier Level:    Status:  Unable to reach  Care Coordinator:  Talita Peña    See Letters for [facility] Care Plan  Date:  September 8, 2014           Gastroenteritis 05/28/2012     Priority: Medium     Thrush, oral 11/12/2011     Priority: Medium     Anemia 11/11/2011     Priority: Medium     Generalized weakness 11/10/2011     Priority: Medium     Personal history of smoking 12/08/2010     Priority: Medium     Hyperlipidemia LDL goal <130 10/31/2010     Priority: Medium     Mild major depression (H) 06/10/2005     Priority: Medium     5/28/2007 doing well on the current dose of Zoloft.        COPD (chronic obstructive pulmonary disease) (HCC) 06/10/2005     Priority: Medium     COPD exacerbation (H)  06/10/2005     Priority: Medium     Generalized anxiety disorder 2011     Priority: Low     Diagnosis updated by automated process. Provider to review and confirm.        Past Medical History:   Diagnosis Date     ROE (generalised anxiety disorder)      Hyperlipidemia LDL goal < 130      Tobacco abuse      Past Surgical History:   Procedure Laterality Date     APPENDECTOMY       C/SECTION, LOW TRANSVERSE  1982    , Low Transverse     CATARACT IOL, RT/LT  2013    right-2013     CHOLECYSTECTOMY, OPEN  1967     ESOPHAGOSCOPY, GASTROSCOPY, DUODENOSCOPY (EGD), COMBINED N/A 2019    Procedure: COMBINED ESOPHAGOSCOPY, GASTROSCOPY, DUODENOSCOPY (EGD);  Surgeon: Justin Davila MD;  Location: [facility]     ESOPHAGOSCOPY, GASTROSCOPY, DUODENOSCOPY (EGD), RESECT MUCOSA, COMBINED N/A 3/1/2019    Procedure: Upper Endoscopy With EMR, and brushings;  Surgeon: John Perez MD;  Location: [facility]     SURGICAL HISTORY OF -       stent to biliary tract     Current Outpatient Medications   Medication Sig Dispense Refill     acetaminophen (TYLENOL) 500 MG tablet Take 2 tablets by mouth every 6 hours as needed.       ADVAIR DISKUS 500-50 MCG/DOSE inhaler INHALE ONE DOSE BY MOUTH EVERY 12 HOURS 3 Inhaler 3     albuterol (PROAIR HFA/PROVENTIL HFA/VENTOLIN HFA) 108 (90 Base) MCG/ACT inhaler Inhale 2 puffs into the lungs every 6 hours (Patient taking differently: Inhale 2 puffs into the lungs every 6 hours as needed ) 4 Inhaler 3     buPROPion (WELLBUTRIN) 75 MG tablet TAKE TWO TABLETS BY MOUTH TWICE DAILY 360 tablet 3     diphenhydrAMINE-acetaminophen (TYLENOL PM)  MG tablet Take 1-2 tablets by mouth nightly as needed.       LORazepam (ATIVAN) 0.5 MG tablet Take 1 tablet (0.5 mg) by mouth every 6 hours as needed for anxiety TAKE 1 TABLET BY MOUTH EVERY 6 HOURS 20 tablet 3     nystatin (MYCOSTATIN) 381980 UNIT/ML suspension SWISH AND SPIT 5 ML BY MOUTH TWICE DAILY BEFORE MEAL(S) IF THRUSH NOT IMPROVING GO TO FOUR  TIMES A DAY FOR 7 DAYS 60 mL 3     omeprazole (PRILOSEC) 40 MG DR capsule Take 20 mg by mouth daily       OXYGEN-HELIUM IN 2-3 liters       sertraline (ZOLOFT) 50 MG tablet Take 3 tablets (150 mg) by mouth daily TAKE three TABLETS BY MOUTH ONCE DAILY 270 tablet 1     SPIRIVA HANDIHALER 18 MCG inhaled capsule INHALE ONE DOSE BY MOUTH ONCE DAILY 90 capsule 3     albuterol (2.5 MG/3ML) 0.083% neb solution Take 1 vial (2.5 mg) by nebulization every 4 hours as needed for shortness of breath / dyspnea (Patient not taking: Reported on 2019) 1 Box 5     OTC products: None, except as noted above    Allergies   Allergen Reactions     Sulfa Drugs Swelling and Difficulty breathing      Latex Allergy: NO    Social History     Tobacco Use     Smoking status: Former Smoker     Packs/day: 4.00     Years: 36.00     Pack years: 144.00     Types: Cigarettes     Last attempt to quit: 1993     Years since quittin.4     Smokeless tobacco: Never Used     Tobacco comment: Started at age 12   Substance Use Topics     Alcohol use: No     History   Drug Use No       REVIEW OF SYSTEMS:   CONSTITUTIONAL: NEGATIVE for fever, chills, change in weight  INTEGUMENTARY/SKIN: NEGATIVE for worrisome rashes, moles or lesions  EYES: NEGATIVE for vision changes or irritation  ENT/MOUTH: NEGATIVE for ear, mouth and throat problems  RESP: NEGATIVE for significant cough or SOB  BREAST: NEGATIVE for masses, tenderness or discharge  CV: NEGATIVE for chest pain, palpitations or peripheral edema  GI: NEGATIVE for nausea, abdominal pain, heartburn, or change in bowel habits  : NEGATIVE for frequency, dysuria, or hematuria  MUSCULOSKELETAL: NEGATIVE for significant arthralgias or myalgia  NEURO: NEGATIVE for weakness, dizziness or paresthesias  ENDOCRINE: NEGATIVE for temperature intolerance, skin/hair changes  HEME: NEGATIVE for bleeding problems  PSYCHIATRIC: NEGATIVE for changes in mood or affect    EXAM:   /62 (Cuff Size: Adult Regular)    Pulse 121   Temp 99.6  F (37.6  C) (Tympanic)   Resp 18   Ht 1.524 m (5')   Wt 47.2 kg (104 lb)   LMP  (LMP Unknown)   SpO2 90%   BMI 20.31 kg/m      GENERAL APPEARANCE: alert and no distress     EYES: EOMI, PERRL     HENT: ear canals and TM's normal and nose and mouth without ulcers or lesions     NECK: no adenopathy, no asymmetry, masses, or scars and thyroid normal to palpation     RESP: lungs clear to auscultation - no rales, rhonchi or wheezes     CV: regular rates and rhythm, normal S1 S2, no S3 or S4 and no murmur, click or rub     ABDOMEN:  soft, nontender, no HSM or masses and bowel sounds normal     MS: extremities normal- no gross deformities noted, no evidence of inflammation in joints, FROM in all extremities.     SKIN: no suspicious lesions or rashes     NEURO: Normal strength and tone, sensory exam grossly normal, mentation intact and speech normal     PSYCH: mentation appears normal. and affect normal/bright     LYMPHATICS: No cervical adenopathy    DIAGNOSTICS:   EKG: Not indicated due to non-vascular surgery and last ekg on 2/26/2019 (within 30 days for CAD history or last year for cardiac risk factors)    Results for orders placed or performed in visit on 05/13/19   CBC with platelets   Result Value Ref Range    WBC 11.1 (H) 4.0 - 11.0 10e9/L    RBC Count 4.74 3.8 - 5.2 10e12/L    Hemoglobin 13.5 11.7 - 15.7 g/dL    Hematocrit 44.3 35.0 - 47.0 %    MCV 94 78 - 100 fl    MCH 28.5 26.5 - 33.0 pg    MCHC 30.5 (L) 31.5 - 36.5 g/dL    RDW 13.1 10.0 - 15.0 %    Platelet Count 437 150 - 450 10e9/L     CHEST TWO VIEWS May 13, 2019 3:54 PM     HISTORY: Preoperative general physical exam. Chronic obstructive  pulmonary disease.    COMPARISON: 3/18/2019.      Impression     IMPRESSION: Hyperinflated lungs, no infiltrates, normal heart size and  pulmonary vascularity, no pleural effusion, no change.    ZARINA MANTILLA MD     IMPRESSION:   Reason for surgery/procedure: history of esophagus stenosis  s/p dilation and significant candida infection now in need of repeat scope to ensure resolution of candida infection and dilation     The proposed surgical procedure is considered INTERMDIATE risk.    REVISED CARDIAC RISK INDEX  The patient has the following serious cardiovascular risks for perioperative complications such as (MI, PE, VFib and 3  AV Block):  No serious cardiac risks  INTERPRETATION: 0 risks: Class I (very low risk - 0.4% complication rate)    The patient has the following additional risks for perioperative complications:  Oxygen dependent lung disease      ICD-10-CM    1. Preop general physical exam Z01.818 XR Chest 2 Views     CBC with platelets   2. Esophageal candidiasis (H) B37.81    3. Esophageal stenosis K22.2    4. Chronic obstructive pulmonary disease, unspecified COPD type (H) J44.9 XR Chest 2 Views     CBC with platelets   5. Major depressive disorder, single episode, mild (H) F32.0 sertraline (ZOLOFT) 50 MG tablet   6. Generalized anxiety disorder F41.1 LORazepam (ATIVAN) 0.5 MG tablet       RECOMMENDATIONS:     Pulmonary Risk  Incentive spirometry post op  Respiratory Therapy (Respiratory Care IP Consult)  post op  Maximize COPD treatment current inhalers       --Patient is to take all scheduled medications on the day of surgery EXCEPT for modifications listed below.    APPROVAL GIVEN to proceed with proposed procedure, without further diagnostic evaluation       Signed Electronically by: MARYJANE Klein CNP    Copy of this evaluation report is provided to requesting physician.    [facility] Preop Guidelines    Revised Cardiac Risk Index

## 2019-05-16 ENCOUNTER — HOSPITAL ENCOUNTER (OUTPATIENT)
Facility: CLINIC | Age: 74
Discharge: HOME | End: 2019-05-16
Attending: INTERNAL MEDICINE | Admitting: INTERNAL MEDICINE
Payer: MEDICARE

## 2019-05-16 VITALS
RESPIRATION RATE: 18 BRPM | WEIGHT: 102.95 LBS | BODY MASS INDEX: 20.21 KG/M2 | DIASTOLIC BLOOD PRESSURE: 43 MMHG | OXYGEN SATURATION: 98 % | HEIGHT: 60 IN | SYSTOLIC BLOOD PRESSURE: 101 MMHG | TEMPERATURE: 97.6 F | HEART RATE: 94 BPM

## 2019-05-16 LAB
ANION GAP SERPL CALCULATED.3IONS-SCNC: 8 MMOL/L (ref 3–14)
BUN SERPL-MCNC: 11 MG/DL (ref 7–30)
CALCIUM SERPL-MCNC: 9 MG/DL (ref 8.5–10.1)
CHLORIDE SERPL-SCNC: 105 MMOL/L (ref 94–109)
CO2 SERPL-SCNC: 27 MMOL/L (ref 20–32)
CREAT SERPL-MCNC: 0.96 MG/DL (ref 0.52–1.04)
GFR SERPL CREATININE-BSD FRML MDRD: 58 ML/MIN/{1.73_M2}
GLUCOSE BLDC GLUCOMTR-MCNC: 96 MG/DL (ref 70–99)
GLUCOSE SERPL-MCNC: 98 MG/DL (ref 70–99)
POTASSIUM SERPL-SCNC: 3.5 MMOL/L (ref 3.4–5.3)
SODIUM SERPL-SCNC: 140 MMOL/L (ref 133–144)
UPPER GI ENDOSCOPY: NORMAL

## 2019-05-16 PROCEDURE — 82962 GLUCOSE BLOOD TEST: CPT

## 2019-05-16 PROCEDURE — 37000009 ZZH ANESTHESIA TECHNICAL FEE, EACH ADDTL 15 MIN: Performed by: INTERNAL MEDICINE

## 2019-05-16 PROCEDURE — 25000132 ZZH RX MED GY IP 250 OP 250 PS 637: Mod: GY | Performed by: NURSE ANESTHETIST, CERTIFIED REGISTERED

## 2019-05-16 PROCEDURE — 36000053 ZZH SURGERY LEVEL 2 EA 15 ADDTL MIN - UMMC: Performed by: INTERNAL MEDICINE

## 2019-05-16 PROCEDURE — 37000008 ZZH ANESTHESIA TECHNICAL FEE, 1ST 30 MIN: Performed by: INTERNAL MEDICINE

## 2019-05-16 PROCEDURE — 27210794 ZZH OR GENERAL SUPPLY STERILE: Performed by: INTERNAL MEDICINE

## 2019-05-16 PROCEDURE — 40000170 ZZH STATISTIC PRE-PROCEDURE ASSESSMENT II: Performed by: INTERNAL MEDICINE

## 2019-05-16 PROCEDURE — 25800030 ZZH RX IP 258 OP 636: Performed by: NURSE ANESTHETIST, CERTIFIED REGISTERED

## 2019-05-16 PROCEDURE — 36415 COLL VENOUS BLD VENIPUNCTURE: CPT | Performed by: ANESTHESIOLOGY

## 2019-05-16 PROCEDURE — 93010 ELECTROCARDIOGRAM REPORT: CPT | Performed by: INTERNAL MEDICINE

## 2019-05-16 PROCEDURE — 25000125 ZZHC RX 250: Performed by: INTERNAL MEDICINE

## 2019-05-16 PROCEDURE — 25000125 ZZHC RX 250: Performed by: ANESTHESIOLOGY

## 2019-05-16 PROCEDURE — A9270 NON-COVERED ITEM OR SERVICE: HCPCS | Performed by: INTERNAL MEDICINE

## 2019-05-16 PROCEDURE — 36000055 ZZH SURGERY LEVEL 2 W FLUORO 1ST 30 MIN - UMMC: Performed by: INTERNAL MEDICINE

## 2019-05-16 PROCEDURE — 71000027 ZZH RECOVERY PHASE 2 EACH 15 MINS: Performed by: INTERNAL MEDICINE

## 2019-05-16 PROCEDURE — 25000128 H RX IP 250 OP 636: Performed by: NURSE ANESTHETIST, CERTIFIED REGISTERED

## 2019-05-16 PROCEDURE — A9270 NON-COVERED ITEM OR SERVICE: HCPCS | Mod: GY | Performed by: NURSE ANESTHETIST, CERTIFIED REGISTERED

## 2019-05-16 PROCEDURE — 25000125 ZZHC RX 250: Performed by: NURSE ANESTHETIST, CERTIFIED REGISTERED

## 2019-05-16 PROCEDURE — 80048 BASIC METABOLIC PNL TOTAL CA: CPT | Performed by: ANESTHESIOLOGY

## 2019-05-16 PROCEDURE — 40000065 ZZH STATISTIC EKG NON-CHARGEABLE

## 2019-05-16 RX ORDER — LIDOCAINE 40 MG/G
CREAM TOPICAL
Status: DISCONTINUED | OUTPATIENT
Start: 2019-05-16 | End: 2019-05-16 | Stop reason: HOSPADM

## 2019-05-16 RX ORDER — SODIUM CHLORIDE, SODIUM LACTATE, POTASSIUM CHLORIDE, CALCIUM CHLORIDE 600; 310; 30; 20 MG/100ML; MG/100ML; MG/100ML; MG/100ML
INJECTION, SOLUTION INTRAVENOUS CONTINUOUS
Status: DISCONTINUED | OUTPATIENT
Start: 2019-05-16 | End: 2019-05-16 | Stop reason: HOSPADM

## 2019-05-16 RX ORDER — SODIUM CHLORIDE, SODIUM LACTATE, POTASSIUM CHLORIDE, CALCIUM CHLORIDE 600; 310; 30; 20 MG/100ML; MG/100ML; MG/100ML; MG/100ML
INJECTION, SOLUTION INTRAVENOUS CONTINUOUS PRN
Status: DISCONTINUED | OUTPATIENT
Start: 2019-05-16 | End: 2019-05-16

## 2019-05-16 RX ORDER — ONDANSETRON 4 MG/1
4 TABLET, ORALLY DISINTEGRATING ORAL EVERY 6 HOURS PRN
Status: DISCONTINUED | OUTPATIENT
Start: 2019-05-16 | End: 2019-05-16 | Stop reason: HOSPADM

## 2019-05-16 RX ORDER — NALOXONE HYDROCHLORIDE 0.4 MG/ML
.1-.4 INJECTION, SOLUTION INTRAMUSCULAR; INTRAVENOUS; SUBCUTANEOUS
Status: DISCONTINUED | OUTPATIENT
Start: 2019-05-16 | End: 2019-05-16 | Stop reason: HOSPADM

## 2019-05-16 RX ORDER — ONDANSETRON 2 MG/ML
4 INJECTION INTRAMUSCULAR; INTRAVENOUS
Status: DISCONTINUED | OUTPATIENT
Start: 2019-05-16 | End: 2019-05-16 | Stop reason: HOSPADM

## 2019-05-16 RX ORDER — FLUMAZENIL 0.1 MG/ML
0.2 INJECTION, SOLUTION INTRAVENOUS
Status: DISCONTINUED | OUTPATIENT
Start: 2019-05-16 | End: 2019-05-16 | Stop reason: HOSPADM

## 2019-05-16 RX ORDER — PROPOFOL 10 MG/ML
INJECTION, EMULSION INTRAVENOUS CONTINUOUS PRN
Status: DISCONTINUED | OUTPATIENT
Start: 2019-05-16 | End: 2019-05-16

## 2019-05-16 RX ORDER — LIDOCAINE HYDROCHLORIDE 20 MG/ML
INJECTION, SOLUTION INFILTRATION; PERINEURAL PRN
Status: DISCONTINUED | OUTPATIENT
Start: 2019-05-16 | End: 2019-05-16

## 2019-05-16 RX ORDER — IPRATROPIUM BROMIDE AND ALBUTEROL SULFATE 2.5; .5 MG/3ML; MG/3ML
3 SOLUTION RESPIRATORY (INHALATION) ONCE
Status: COMPLETED | OUTPATIENT
Start: 2019-05-16 | End: 2019-05-16

## 2019-05-16 RX ORDER — ONDANSETRON 2 MG/ML
INJECTION INTRAMUSCULAR; INTRAVENOUS PRN
Status: DISCONTINUED | OUTPATIENT
Start: 2019-05-16 | End: 2019-05-16

## 2019-05-16 RX ORDER — IPRATROPIUM BROMIDE AND ALBUTEROL SULFATE 2.5; .5 MG/3ML; MG/3ML
3 SOLUTION RESPIRATORY (INHALATION) ONCE
Status: DISCONTINUED | OUTPATIENT
Start: 2019-05-16 | End: 2019-05-16

## 2019-05-16 RX ORDER — FENTANYL CITRATE 50 UG/ML
INJECTION, SOLUTION INTRAMUSCULAR; INTRAVENOUS PRN
Status: DISCONTINUED | OUTPATIENT
Start: 2019-05-16 | End: 2019-05-16

## 2019-05-16 RX ORDER — ONDANSETRON 2 MG/ML
4 INJECTION INTRAMUSCULAR; INTRAVENOUS EVERY 6 HOURS PRN
Status: DISCONTINUED | OUTPATIENT
Start: 2019-05-16 | End: 2019-05-16 | Stop reason: HOSPADM

## 2019-05-16 RX ADMIN — FENTANYL CITRATE 50 MCG: 50 INJECTION, SOLUTION INTRAMUSCULAR; INTRAVENOUS at 12:40

## 2019-05-16 RX ADMIN — IPRATROPIUM BROMIDE AND ALBUTEROL SULFATE 3 ML: .5; 3 SOLUTION RESPIRATORY (INHALATION) at 12:00

## 2019-05-16 RX ADMIN — MIDAZOLAM 0.5 MG: 1 INJECTION INTRAMUSCULAR; INTRAVENOUS at 12:30

## 2019-05-16 RX ADMIN — BENZOCAINE 1 APPLICATOR: 200 GEL ORAL at 12:30

## 2019-05-16 RX ADMIN — MIDAZOLAM 0.5 MG: 1 INJECTION INTRAMUSCULAR; INTRAVENOUS at 12:49

## 2019-05-16 RX ADMIN — SODIUM CHLORIDE, POTASSIUM CHLORIDE, SODIUM LACTATE AND CALCIUM CHLORIDE: 600; 310; 30; 20 INJECTION, SOLUTION INTRAVENOUS at 12:30

## 2019-05-16 RX ADMIN — LIDOCAINE HYDROCHLORIDE 30 MG: 20 INJECTION, SOLUTION INFILTRATION; PERINEURAL at 12:43

## 2019-05-16 RX ADMIN — PROPOFOL 100 MCG/KG/MIN: 10 INJECTION, EMULSION INTRAVENOUS at 12:43

## 2019-05-16 RX ADMIN — ONDANSETRON 4 MG: 2 INJECTION INTRAMUSCULAR; INTRAVENOUS at 13:00

## 2019-05-16 ASSESSMENT — MIFFLIN-ST. JEOR: SCORE: 893.5

## 2019-05-16 NOTE — ANESTHESIA POSTPROCEDURE EVALUATION
Anesthesia POST Procedure Evaluation    Patient: Tari Wiley   MRN:     [id number] Gender:   female   Age:    74 year old :      1945        Preoperative Diagnosis: Esophageal Stricture   Procedure(s):  Upper Endoscopy With Possible Dilation   Postop Comments: No value filed.       Anesthesia Type:  MAC  No value filed.    Reportable Event: NO     PAIN: Uncomplicated   Sign Out status: Comfortable, Well controlled pain     PONV: No PONV   Sign Out status:  No Nausea or Vomiting     Neuro/Psych: Uneventful perioperative course   Sign Out Status: Preoperative baseline; Age appropriate mentation     Airway/Resp.: Uneventful perioperative course   Sign Out Status: Non labored breathing, age appropriate RR; Resp. Status within EXPECTED Parameters     CV: Uneventful perioperative course   Sign Out status: Appropriate BP and perfusion indices; Appropriate HR/Rhythm     Disposition:   Sign Out in:  PACU  Disposition:  Phase II; Home  Recovery Course: Uneventful  Follow-Up: Not required           Last Anesthesia Record Vitals:  [facility] VITALS  2019 1240 - 2019 1334      2019             Resp Rate (observed):  18          Last PACU Vitals:  Vitals Value Taken Time   BP     Temp     Pulse     Resp     SpO2     Temp src Axillary 2019  1:00 PM   NIBP 115/61 2019  1:10 PM   Pulse 96 2019  1:10 PM   SpO2 94 % 2019  1:08 PM   Resp     Temp 36.3  C (97.3  F) 2019  1:02 PM   Ht Rate 100 2019  1:07 PM   Temp 2           Electronically Signed By: Duane F. Szczepanski, MD, May 16, 2019, 1:34 PM

## 2019-05-16 NOTE — ANESTHESIA CARE TRANSFER NOTE
Patient: Tari Wiley    Procedure(s):  Upper Endoscopy With Possible Dilation    Diagnosis: Esophageal Stricture  Diagnosis Additional Information: No value filed.    Anesthesia Type:   No value filed.     Note:  Airway :Nasal Cannula  Patient transferred to:Phase II  Comments: Pt states comfort now and throughout procedure. On 2LNC O2. Handoff Report: Identifed the Patient, Identified the Reponsible Provider, Reviewed the pertinent medical history, Discussed the surgical course, Reviewed Intra-OP anesthesia mangement and issues during anesthesia, Set expectations for post-procedure period and Allowed opportunity for questions and acknowledgement of understanding      Vitals: (Last set prior to Anesthesia Care Transfer)    CR[facility] VITALS  5/16/2019 1240 - 5/16/2019 1318      5/16/2019             Pulse:  103    SpO2:  94 %                Electronically Signed By: MARYJANE Richardson CRNA  May 16, 2019  1:18 PM

## 2019-05-16 NOTE — OP NOTE
Upper GI Endoscopy 05/16/2019 12:33 PM [facility], [address] [place] [address] [phone]     Endoscopy Department   _______________________________________________________________________________   Patient Name: Tari Wiley         Procedure Date: 5/16/2019 12:33 PM   MRN: [id number]                       Account Number: [id number]   YOB: 1945              Admit Type: Outpatient   Age: 74                                Gender: Female   Note Status: Finalized                Attending MD: John Perez MD   Pause for the Cause: time out performed Total Sedation Time:   _______________________________________________________________________________       Procedure:           Upper GI endoscopy   Indications:         Dysphagia; had candida esophagitis with esophageal                        stricturing s/p diflucan x 2 weeks, dysphagia has                        essentially resolved after treatment. Plan to repeat EGD                        to confirm erradication of candida and resolution of                        stricture.   Providers:           John Perez MD   Referring MD:           Requesting Provider: Justin Davila MD   Medicines:           Monitored Anesthesia Care   Complications:       No immediate complications. Estimated blood loss:                        Minimal.   _______________________________________________________________________________   Procedure:           Pre-Anesthesia Assessment:                        - Prior to the procedure, a History and Physical was                        performed, and patient medications and allergies were                        reviewed. The patient is competent. The risks and                        benefits of the procedure and the sedation options and                        risks were discussed with the patient. All questions                        were answered and informed consent was obtained.  Patient                        identification and proposed procedure were verified by                        the physician, the nurse, the anesthesiologist and the                        anesthetist in the procedure room. Mental Status                        Examination: alert and oriented. Airway Examination:                        normal oropharyngeal airway and neck mobility.                        Respiratory Examination: clear to auscultation. CV                        Examination: normal. Prophylactic Antibiotics: The                        patient does not require prophylactic antibiotics. Prior                        Anticoagulants: The patient has taken no previous                        anticoagulant or antiplatelet agents. ASA Grade                        Assessment: III - A patient with severe systemic                        disease. After reviewing the risks and benefits, the                        patient was deemed in satisfactory condition to undergo                        the procedure. The anesthesia plan was to use monitored                        anesthesia care (MAC). Immediately prior to                         administration of medications, the patient was                        re-assessed for adequacy to receive sedatives. The heart                        rate, respiratory rate, oxygen saturations, blood                        pressure, adequacy of pulmonary ventilation, and                        response to care were monitored throughout the                        procedure. The physical status of the patient was                        re-assessed after the procedure.                        After obtaining informed consent, the endoscope was                        passed under direct vision. Throughout the procedure,                        the patient's blood pressure, pulse, and oxygen                        saturations were monitored continuously. The Endoscope                        was  introduced through the mouth, and advanced to the                        second part of duodenum. The upper GI endoscopy was                        accomplished without difficulty. The patient tolerated                        the procedure well.                                                                                     Findings:        One benign-appearing, intrinsic stenosis was found in the entire        esophagus. This stenosis was mildly severe (non-circumferential        scarring) and measured 1.1 cm (inner diameter). The stenosis was        traversed. A guidewire was placed and the scope was withdrawn. Dilation        was performed with a Savary dilator with mild resistance at 12 mm and        moderate resistance at 12.8 mm and 14 mm. The dilation site was examined        following endoscope reinsertion and showed no perforation. Estimated        blood loss was minimal.        The stomach was normal.        The examined duodenum was normal.                                                                                     Impression:          - Benign appearing stricture throughout the esophagus                        likely due to prior candida esophagitis. Savary dilation                        from 12 mm to 14 mm with appropriate effect.                        - Normal stomach.                        - Normal examined duodenum.                        - No specimens collected.   Recommendation:      - Discharge patient to home (ambulatory).                        - Resume previous diet.                        - Observe patient's clinical course following today's                        procedure. Symptomatically she has improved just with                        candida treatment and dilation today was to decrease her                        risk of food impaction. 14 mm dilation may be enough for                        her clinically. If she has a recurrence of dysphagia,                        would  first try empiric treatment for candida                        esophagitis given her risk for recurrence of this. If                        dysphagia still persistent, she can then be referred                        back for repeat EGD with MAC.                                                                                       John Perez MD

## 2019-05-16 NOTE — DISCHARGE INSTRUCTIONS
[facility]  Same-Day Surgery   Adult Discharge Orders & Instructions     For 24 hours after surgery    1. Get plenty of rest.  A responsible adult must stay with you for at least 24 hours after you leave the hospital.   2. Do not drive or use heavy equipment.  If you have weakness or tingling, don't drive or use heavy equipment until this feeling goes away.  3. Do not drink alcohol.  4. Avoid strenuous or risky activities.  Ask for help when climbing stairs.   5. You may feel lightheaded.  IF so, sit for a few minutes before standing.  Have someone help you get up.   6. If you have nausea (feel sick to your stomach): Drink only clear liquids such as apple juice, ginger ale, broth or 7-Up.  Rest may also help.  Be sure to drink enough fluids.  Move to a regular diet as you feel able.  7. You may have a slight fever. Call the doctor if your fever is over 100 F (37.7 C) (taken under the tongue) or lasts longer than 24 hours.  8. You may have a dry mouth, a sore throat, muscle aches or trouble sleeping.  These should go away after 24 hours.  9. Do not make important or legal decisions.   Call your doctor for any of the followin.  Signs of infection (fever, growing tenderness at the surgery site, a large amount of drainage or bleeding, severe pain, foul-smelling drainage, redness, swelling).    2. It has been over 8 to 10 hours since surgery and you are still not able to urinate (pass water).    3.  Headache for over 24 hours.    To contact a doctor, call Dr. Perez's clinic at [phone] or:        [phone] and ask for the resident on call for Surgery/Gastroenterology (answered 24 hours a day)      Emergency Department:    [facility]: [phone]       (TTY for hearing impaired: [phone])

## 2019-05-17 LAB — INTERPRETATION ECG - MUSE: NORMAL

## 2019-05-29 DIAGNOSIS — F41.1 GENERALIZED ANXIETY DISORDER: ICD-10-CM

## 2019-05-29 RX ORDER — LORAZEPAM 0.5 MG/1
TABLET ORAL
Qty: 20 TABLET | Refills: 5 | Status: SHIPPED | OUTPATIENT
Start: 2019-05-29 | End: 2019-06-03

## 2019-05-29 NOTE — TELEPHONE ENCOUNTER
Controlled Substance Refill Request for LORazepam (ATIVAN) 0.5 MG tablet  Problem List Complete:  Yes   checked in past 3 months?  No, route to RN     Last Written Prescription Date:  5/13/19  Last Fill Quantity: 20,  # refills: 3   Last office visit: 5/13/2019 with prescribing provider:     Future Office Visit:

## 2019-05-29 NOTE — TELEPHONE ENCOUNTER
Pt called. States she brought the May 13 script from [facility] into the pharmacy and she states they told her they could not fill it. I called [facility] in [place] and they state they do not have the script nor do they have record of it. Last fill was in April, 19 2019 this is consistent with .  Can you provide another script? Kate Willard RN

## 2019-05-29 NOTE — TELEPHONE ENCOUNTER
Requested Prescriptions   Pending Prescriptions Disp Refills     LORazepam (ATIVAN) 0.5 MG tablet 20 tablet 5       There is no refill protocol information for this order            Last Written Prescription Date:  5/29/19  Last Fill Quantity: 20,   # refills: 5  Last Office Visit: 5/13/19  [facility]  Future Office visit:       Routing refill request to provider for review/approval because:    Drug not on the [facility], [facility] or [facility] refill protocol or controlled substance

## 2019-05-30 RX ORDER — LORAZEPAM 0.5 MG/1
TABLET ORAL
Qty: 20 TABLET | Refills: 5 | Status: CANCELLED | OUTPATIENT
Start: 2019-05-30

## 2019-06-03 ENCOUNTER — TELEPHONE (OUTPATIENT)
Dept: FAMILY MEDICINE | Facility: CLINIC | Age: 74
End: 2019-06-03

## 2019-06-03 DIAGNOSIS — F41.1 GENERALIZED ANXIETY DISORDER: ICD-10-CM

## 2019-06-03 RX ORDER — LORAZEPAM 0.5 MG/1
TABLET ORAL
Qty: 20 TABLET | Refills: 5 | Status: SHIPPED | OUTPATIENT
Start: 2019-06-03 | End: 2019-12-30

## 2019-06-03 NOTE — TELEPHONE ENCOUNTER
Plan limit the Lorazepam 0.5mg tablets to max of 3 per day.      Options:  1.) change rx to 1mg tablets and break in half.  2.) try a qty limit overide if she cannot break the tablets in half.  3.) change directions to 1 TID.

## 2019-07-01 ENCOUNTER — TELEPHONE (OUTPATIENT)
Dept: FAMILY MEDICINE | Facility: CLINIC | Age: 74
End: 2019-07-01

## 2019-07-01 DIAGNOSIS — J44.1 OBSTRUCTIVE CHRONIC BRONCHITIS WITH EXACERBATION (H): ICD-10-CM

## 2019-07-01 RX ORDER — AZITHROMYCIN 250 MG/1
TABLET, FILM COATED ORAL
Qty: 5 TABLET | Refills: 0 | Status: SHIPPED | OUTPATIENT
Start: 2019-07-01 | End: 2019-10-28

## 2019-07-01 RX ORDER — PREDNISONE 20 MG/1
TABLET ORAL
Qty: 20 TABLET | Refills: 0 | Status: SHIPPED | OUTPATIENT
Start: 2019-07-01 | End: 2019-09-06

## 2019-07-01 NOTE — TELEPHONE ENCOUNTER
"Reason for call:  Patient reporting a symptom    Symptom or request: Pt says she is wheezing and a dry hacking cough. She coughs up \"hard green junk\". No energy. She wonders if Dr Peters could order some antibiotics and Prednisone. No temp but say she never gets a temp with this.     Duration (how long have symptoms been present): One week.     Have you been treated for this before? Yes    Additional comments: [facility] in [place]    Phone Number patient can be reached at:  [phone]    Best Time:  anytime    Can we leave a detailed message on this number:  YES    Call taken on 7/1/2019 at 12:54 PM by Patrica King    "

## 2019-07-10 ENCOUNTER — TELEPHONE (OUTPATIENT)
Dept: FAMILY MEDICINE | Facility: CLINIC | Age: 74
End: 2019-07-10

## 2019-07-10 NOTE — TELEPHONE ENCOUNTER
FYI~ A refill has been made to the  assistance program for Spiriva Handihaler & Advair Diskus     A 90 day supply of Spiriva Handihaler & Advair Diskus  will be delivered to Saige's home within 7-10 business days.    Thank you,    Cheryl Pierre  Prescription Assistance

## 2019-07-29 ENCOUNTER — TELEPHONE (OUTPATIENT)
Dept: FAMILY MEDICINE | Facility: CLINIC | Age: 74
End: 2019-07-29

## 2019-07-29 NOTE — TELEPHONE ENCOUNTER
Panel Management Review      Patient has the following on her problem list: None      Composite cancer screening  Chart review shows that this patient is due/due soon for the following Colonoscopy  Summary:    Patient is due/failing the following:   COLONOSCOPY    Action needed:   Call to schedule - order is placed    Type of outreach:    Sent letter. with 10 reasons why    Questions for provider review:    None                                                                                                                                    Lyn Lamb MA

## 2019-09-06 ENCOUNTER — OFFICE VISIT (OUTPATIENT)
Dept: FAMILY MEDICINE | Facility: CLINIC | Age: 74
End: 2019-09-06
Payer: MEDICARE

## 2019-09-06 VITALS
BODY MASS INDEX: 19.2 KG/M2 | RESPIRATION RATE: 18 BRPM | SYSTOLIC BLOOD PRESSURE: 110 MMHG | WEIGHT: 97.8 LBS | OXYGEN SATURATION: 88 % | TEMPERATURE: 98.6 F | HEIGHT: 60 IN | HEART RATE: 110 BPM | DIASTOLIC BLOOD PRESSURE: 60 MMHG

## 2019-09-06 DIAGNOSIS — J44.9 CHRONIC OBSTRUCTIVE PULMONARY DISEASE, UNSPECIFIED COPD TYPE (H): Primary | ICD-10-CM

## 2019-09-06 PROCEDURE — 99213 OFFICE O/P EST LOW 20 MIN: CPT | Performed by: NURSE PRACTITIONER

## 2019-09-06 ASSESSMENT — ANXIETY QUESTIONNAIRES
1. FEELING NERVOUS, ANXIOUS, OR ON EDGE: SEVERAL DAYS
6. BECOMING EASILY ANNOYED OR IRRITABLE: SEVERAL DAYS
2. NOT BEING ABLE TO STOP OR CONTROL WORRYING: NEARLY EVERY DAY
3. WORRYING TOO MUCH ABOUT DIFFERENT THINGS: NEARLY EVERY DAY
GAD7 TOTAL SCORE: 9
7. FEELING AFRAID AS IF SOMETHING AWFUL MIGHT HAPPEN: SEVERAL DAYS
5. BEING SO RESTLESS THAT IT IS HARD TO SIT STILL: NOT AT ALL

## 2019-09-06 ASSESSMENT — PATIENT HEALTH QUESTIONNAIRE - PHQ9
SUM OF ALL RESPONSES TO PHQ QUESTIONS 1-9: 16
5. POOR APPETITE OR OVEREATING: NOT AT ALL

## 2019-09-06 ASSESSMENT — MIFFLIN-ST. JEOR: SCORE: 870.12

## 2019-09-06 NOTE — PROGRESS NOTES
Subjective     Tari Wiley is a 74 year old female who presents to clinic today for the following health issues:    HPI   COPD Follow-Up    Overall, how are your COPD symptoms since your last clinic visit?  Slightly worse    How much fatigue or shortness of breath do you have when you are walking?  More than usual    How much shortness of breath do you have when you are resting?  Same as usual    How often do you cough? All the time    Have you noticed any change in your sputum/phlegm?  No    Have you experienced a recent fever? No    Please describe how far you can walk without stopping to rest:  The length of 1-2 rooms    How many flights of stairs are you able to walk up without stopping?  None    Have you had any Emergency Room Visits, Urgent Care Visits, or Hospital Admissions because of your COPD since your last office visit?  No     Oxygen -  Resting on room air - 88%   Resting on 2 L of oxygen - 92%  Activity on room air - 82%  Activity on 2 L of oxygen - 87%   will use 3 L of O2 when breathing is worse     History   Smoking Status     Former Smoker     Packs/day: 4.00     Years: 36.00     Types: Cigarettes     Quit date: 1993   Smokeless Tobacco     Never Used     Comment: Started at age 12     No results found for: FEV1, [id number]    Patient Active Problem List   Diagnosis     Mild major depression (H)     Hyperlipidemia LDL goal <130     Personal history of smoking     Generalized anxiety disorder     Generalized weakness     Anemia     Thrush, oral     Pneumonia     Gastroenteritis     Respiratory failure, acute (H)     COPD (chronic obstructive pulmonary disease) (HCC)     COPD exacerbation (H)     Health Care Home-Not active     Mammogram declined     Esophageal candidiasis (H)     Esophageal stenosis     Past Surgical History:   Procedure Laterality Date     APPENDECTOMY       C/SECTION, LOW TRANSVERSE  1982    , Low Transverse     CATARACT IOL, RT/LT  2013    right-2013      CHOLECYSTECTOMY, OPEN       ESOPHAGOSCOPY, GASTROSCOPY, DUODENOSCOPY (EGD), COMBINED N/A 2019    Procedure: COMBINED ESOPHAGOSCOPY, GASTROSCOPY, DUODENOSCOPY (EGD);  Surgeon: Justin Davila MD;  Location: [facility]     ESOPHAGOSCOPY, GASTROSCOPY, DUODENOSCOPY (EGD), DILATATION, COMBINED N/A 2019    Procedure: Upper Endoscopy With Dilation;  Surgeon: John Perez MD;  Location: [facility]     ESOPHAGOSCOPY, GASTROSCOPY, DUODENOSCOPY (EGD), RESECT MUCOSA, COMBINED N/A 3/1/2019    Procedure: Upper Endoscopy With EMR, and brushings;  Surgeon: John Perez MD;  Location: [facility]     SURGICAL HISTORY OF -       stent to biliary tract       Social History     Tobacco Use     Smoking status: Former Smoker     Packs/day: 4.00     Years: 36.00     Pack years: 144.00     Types: Cigarettes     Last attempt to quit: 1993     Years since quittin.7     Smokeless tobacco: Never Used     Tobacco comment: Started at age 12   Substance Use Topics     Alcohol use: No     Family History   Problem Relation Age of Onset     Alcohol/Drug Father      Cancer Brother          Current Outpatient Medications   Medication Sig Dispense Refill     acetaminophen (TYLENOL) 500 MG tablet Take 2 tablets by mouth every 6 hours as needed.       ADVAIR DISKUS 500-50 MCG/DOSE inhaler INHALE ONE DOSE BY MOUTH EVERY 12 HOURS 3 Inhaler 3     albuterol (2.5 MG/3ML) 0.083% neb solution Take 1 vial (2.5 mg) by nebulization every 4 hours as needed for shortness of breath / dyspnea 1 Box 5     albuterol (PROAIR HFA/PROVENTIL HFA/VENTOLIN HFA) 108 (90 Base) MCG/ACT inhaler Inhale 2 puffs into the lungs every 6 hours (Patient taking differently: Inhale 2 puffs into the lungs every 6 hours as needed ) 4 Inhaler 3     azithromycin (ZITHROMAX) 250 MG tablet 2 TABLETS TODAY, THEN 1 TABLET DAILY THEREAFTER 5 tablet 0     buPROPion (WELLBUTRIN) 75 MG tablet TAKE TWO TABLETS BY MOUTH TWICE DAILY 360 tablet 3     diphenhydrAMINE-acetaminophen (TYLENOL  PM)  MG tablet Take 1-2 tablets by mouth nightly as needed.       LORazepam (ATIVAN) 0.5 MG tablet TAKE 1 TABLET BY MOUTH EVERY 8 HOURS 20 tablet 5     nystatin (MYCOSTATIN) 023744 UNIT/ML suspension SWISH AND SPIT 5 ML BY MOUTH TWICE DAILY BEFORE MEAL(S) IF THRUSH NOT IMPROVING GO TO FOUR TIMES A DAY FOR 7 DAYS 60 mL 3     omeprazole (PRILOSEC) 40 MG DR capsule Take 20 mg by mouth daily       order for DME Equipment being ordered: Oxygen  Expected life-long  Oxygen -  Resting on room air - 88%   Resting on 2 L of oxygen - 92%  Activity on room air - 82%  Activity on 2 L of oxygen - 87% 1 Device 11     OXYGEN-HELIUM IN 2-3 liters       sertraline (ZOLOFT) 50 MG tablet Take 3 tablets (150 mg) by mouth daily TAKE three TABLETS BY MOUTH ONCE DAILY 270 tablet 1     SPIRIVA HANDIHALER 18 MCG inhaled capsule INHALE ONE DOSE BY MOUTH ONCE DAILY 90 capsule 3     Allergies   Allergen Reactions     Sulfa Drugs Swelling and Difficulty breathing       Reviewed and updated as needed this visit by Provider  Tobacco  Allergies  Meds  Problems  Med Hx  Surg Hx  Fam Hx         Review of Systems   ROS COMP: Constitutional, HEENT, cardiovascular, pulmonary, gi and gu systems are negative, except as otherwise noted.      Objective    /60 (Cuff Size: Adult Regular)   Pulse 110   Temp 98.6  F (37  C) (Tympanic)   Resp 18   Ht 1.524 m (5')   Wt 44.4 kg (97 lb 12.8 oz)   LMP  (LMP Unknown)   SpO2 (!) 88%   BMI 19.10 kg/m    Body mass index is 19.1 kg/m .  Physical Exam   GENERAL: alert and no distress  NECK: no adenopathy  CV: regular rate and rhythm, normal S1 S2, no S3 or S4, no murmur, click or rub  PSYCH: mentation appears normal, affect normal/bright    Diagnostic Test Results:  none         Assessment & Plan     1. Chronic obstructive pulmonary disease, unspecified COPD type (H)  Stable. Order for continued oxygen use provided today  - order for DME; Equipment being ordered: Oxygen  Expected  life-long  Activity on 2 L of oxygen - 87%  Dispense: 1 Device; Refill: 11     Home care instructions were reviewed with the patient. The risks, benefits and treatment options of prescribed medications or other treatments have been discussed with the patient. The patient verbalized their understanding and should call or follow up if no improvement or if they develop further problems.      Return in about 1 week (around 9/13/2019), or if symptoms worsen or fail to improve.    MARYJANE Klein [facility]

## 2019-09-07 ASSESSMENT — ANXIETY QUESTIONNAIRES: GAD7 TOTAL SCORE: 9

## 2019-09-19 ENCOUNTER — TELEPHONE (OUTPATIENT)
Dept: FAMILY MEDICINE | Facility: CLINIC | Age: 74
End: 2019-09-19

## 2019-09-19 NOTE — TELEPHONE ENCOUNTER
FYI~ A refill has been made to the  assistance program for Advair Diskus.    A 90 day supply of ADVAIR DISKUS will be delivered to Sa[facility] Home within 7-10 business days.    Thank you,    Cheryl Pierre  Prescription Assistance

## 2019-09-27 ENCOUNTER — TELEPHONE (OUTPATIENT)
Dept: FAMILY MEDICINE | Facility: CLINIC | Age: 74
End: 2019-09-27

## 2019-09-27 DIAGNOSIS — J44.1 CHRONIC OBSTRUCTIVE PULMONARY DISEASE WITH ACUTE EXACERBATION (H): Primary | ICD-10-CM

## 2019-09-27 DIAGNOSIS — B37.0 THRUSH: ICD-10-CM

## 2019-09-27 RX ORDER — NYSTATIN 100000/ML
SUSPENSION, ORAL (FINAL DOSE FORM) ORAL
Qty: 100 ML | Refills: 3 | Status: SHIPPED | OUTPATIENT
Start: 2019-09-27 | End: 2020-04-28

## 2019-09-27 RX ORDER — PREDNISONE 20 MG/1
20 TABLET ORAL 2 TIMES DAILY
Qty: 10 TABLET | Refills: 0 | Status: SHIPPED | OUTPATIENT
Start: 2019-09-27 | End: 2019-10-28

## 2019-09-27 RX ORDER — AZITHROMYCIN 250 MG/1
TABLET, FILM COATED ORAL
Qty: 6 TABLET | Refills: 0 | Status: SHIPPED | OUTPATIENT
Start: 2019-09-27 | End: 2019-10-28

## 2019-09-27 NOTE — TELEPHONE ENCOUNTER
We can do that, please find out which pharmacy he would like to use.    Thanks,     MARYJANE Klein CNP

## 2019-09-27 NOTE — TELEPHONE ENCOUNTER
Saige is calling because she thinks she has pneumonia again.  Cough but no fever.  Please advise.    [facility] [place]

## 2019-10-07 ENCOUNTER — TELEPHONE (OUTPATIENT)
Dept: FAMILY MEDICINE | Facility: CLINIC | Age: 74
End: 2019-10-07

## 2019-10-28 ENCOUNTER — OFFICE VISIT (OUTPATIENT)
Dept: FAMILY MEDICINE | Facility: CLINIC | Age: 74
End: 2019-10-28
Payer: MEDICARE

## 2019-10-28 VITALS
HEIGHT: 60 IN | HEART RATE: 98 BPM | BODY MASS INDEX: 19.28 KG/M2 | WEIGHT: 98.2 LBS | DIASTOLIC BLOOD PRESSURE: 76 MMHG | TEMPERATURE: 99.2 F | OXYGEN SATURATION: 90 % | SYSTOLIC BLOOD PRESSURE: 128 MMHG

## 2019-10-28 DIAGNOSIS — J44.1 COPD EXACERBATION (H): Primary | ICD-10-CM

## 2019-10-28 DIAGNOSIS — Z23 NEED FOR PROPHYLACTIC VACCINATION AND INOCULATION AGAINST INFLUENZA: ICD-10-CM

## 2019-10-28 PROCEDURE — 90662 IIV NO PRSV INCREASED AG IM: CPT | Performed by: NURSE PRACTITIONER

## 2019-10-28 PROCEDURE — G0008 ADMIN INFLUENZA VIRUS VAC: HCPCS | Performed by: NURSE PRACTITIONER

## 2019-10-28 PROCEDURE — 99214 OFFICE O/P EST MOD 30 MIN: CPT | Mod: 25 | Performed by: NURSE PRACTITIONER

## 2019-10-28 RX ORDER — DOXYCYCLINE 100 MG/1
100 CAPSULE ORAL 2 TIMES DAILY
Qty: 14 CAPSULE | Refills: 0 | Status: SHIPPED | OUTPATIENT
Start: 2019-10-28 | End: 2019-11-04

## 2019-10-28 ASSESSMENT — MIFFLIN-ST. JEOR: SCORE: 871.93

## 2019-10-28 NOTE — PROGRESS NOTES
Subjective     Tari Wliey is a 74 year old female who presents to clinic today for the following health issues:    HPI   RESPIRATORY SYMPTOMS      Duration: 1 month    Description  SOB, wheezing, dry cough    Severity: moderate    Accompanying signs and symptoms: weak, fatigue     History (predisposing factors):  COPD    Precipitating or alleviating factors: None    Therapies tried and outcome:  Prednisone and z pack 2019       Patient Active Problem List   Diagnosis     Mild major depression (H)     Hyperlipidemia LDL goal <130     Personal history of smoking     Generalized anxiety disorder     Generalized weakness     Anemia     Thrush, oral     Pneumonia     Gastroenteritis     Respiratory failure, acute (H)     COPD (chronic obstructive pulmonary disease) (HCC)     COPD exacerbation (H)     Health Care Home-Not active     Mammogram declined     Esophageal candidiasis (H)     Esophageal stenosis     Past Surgical History:   Procedure Laterality Date     APPENDECTOMY       C/SECTION, LOW TRANSVERSE      , Low Transverse     CATARACT IOL, RT/LT      right-2013     CHOLECYSTECTOMY, OPEN       ESOPHAGOSCOPY, GASTROSCOPY, DUODENOSCOPY (EGD), COMBINED N/A 2019    Procedure: COMBINED ESOPHAGOSCOPY, GASTROSCOPY, DUODENOSCOPY (EGD);  Surgeon: Justin Davila MD;  Location: [facility]     ESOPHAGOSCOPY, GASTROSCOPY, DUODENOSCOPY (EGD), DILATATION, COMBINED N/A 2019    Procedure: Upper Endoscopy With Dilation;  Surgeon: John Perez MD;  [facility]: [facility]     ESOPHAGOSCOPY, GASTROSCOPY, DUODENOSCOPY (EGD), RESECT MUCOSA, COMBINED N/A 3/1/2019    Procedure: Upper Endoscopy With EMR, and brushings;  Surgeon: John Perez MD;  Location: [facility]     SURGICAL HISTORY OF -       stent to biliary tract       Social History     Tobacco Use     Smoking status: Former Smoker     Packs/day: 4.00     Years: 36.00     Pack years: 144.00     Types: Cigarettes     Last attempt to quit:  1993     Years since quittin.9     Smokeless tobacco: Never Used     Tobacco comment: Started at age 12   Substance Use Topics     Alcohol use: No     Family History   Problem Relation Age of Onset     Alcohol/Drug Father      Cancer Brother          Current Outpatient Medications   Medication Sig Dispense Refill     acetaminophen (TYLENOL) 500 MG tablet Take 2 tablets by mouth every 6 hours as needed.       ADVAIR DISKUS 500-50 MCG/DOSE inhaler INHALE ONE DOSE BY MOUTH EVERY 12 HOURS 3 Inhaler 3     albuterol (2.5 MG/3ML) 0.083% neb solution Take 1 vial (2.5 mg) by nebulization every 4 hours as needed for shortness of breath / dyspnea 1 Box 5     albuterol (PROAIR HFA/PROVENTIL HFA/VENTOLIN HFA) 108 (90 Base) MCG/ACT inhaler Inhale 2 puffs into the lungs every 6 hours (Patient taking differently: Inhale 2 puffs into the lungs every 6 hours as needed ) 4 Inhaler 3     buPROPion (WELLBUTRIN) 75 MG tablet TAKE TWO TABLETS BY MOUTH TWICE DAILY 360 tablet 3     diphenhydrAMINE-acetaminophen (TYLENOL PM)  MG tablet Take 1-2 tablets by mouth nightly as needed.       LORazepam (ATIVAN) 0.5 MG tablet TAKE 1 TABLET BY MOUTH EVERY 8 HOURS 20 tablet 5     nystatin (MYCOSTATIN) 598285 UNIT/ML suspension SWISH AND SPIT 5ML BY MOUTH TWICE DAILY BEFORE MEALS.  IF THRUSH DOES NOT IMPROVE INCREASE TO FOUR TIMES DAILY FOR 7 DAYS 100 mL 3     omeprazole (PRILOSEC) 40 MG DR capsule Take 20 mg by mouth daily       order for DME Equipment being ordered: Oxygen  Expected life-long  Oxygen -  Resting on room air - 88%   Resting on 2 L of oxygen - 92%  Activity on room air - 82%  Activity on 2 L of oxygen - 87% 1 Device 11     OXYGEN-HELIUM IN 2-3 liters       sertraline (ZOLOFT) 50 MG tablet Take 3 tablets (150 mg) by mouth daily TAKE three TABLETS BY MOUTH ONCE DAILY 270 tablet 1     SPIRIVA HANDIHALER 18 MCG inhaled capsule INHALE ONE DOSE BY MOUTH ONCE DAILY 90 capsule 3     Allergies   Allergen Reactions     Sulfa  Drugs Swelling and Difficulty breathing       Reviewed and updated as needed this visit by Provider  Tobacco  Allergies  Meds  Problems  Med Hx  Surg Hx  Fam Hx         Review of Systems   ROS COMP: Constitutional, HEENT, cardiovascular, pulmonary, gi and gu systems are negative, except as otherwise noted.      Objective    /76 (Cuff Size: Adult Small)   Pulse 98   Temp 99.2  F (37.3  C) (Tympanic)   Ht 1.524 m (5')   Wt 44.5 kg (98 lb 3.2 oz)   LMP  (LMP Unknown)   SpO2 90%   BMI 19.18 kg/m    Body mass index is 19.18 kg/m .  Physical Exam   GENERAL: alert and mild distress  HENT: ear canals and TM's normal, nose and mouth without ulcers or lesions  NECK: no adenopathy  RESP: decreased breath sounds throughout  CV: regular rate and rhythm, normal S1 S2, no S3 or S4, no murmur, click or rub  PSYCH: mentation appears normal, affect normal/bright    Diagnostic Test Results:  none         Assessment & Plan     1. COPD exacerbation (H)  Mild distress.  COPD with acute exacerbation, recommend chest x ray for further evaluation of symptoms however Ginger refused.  Will treat with doxycycline and Ginger will follow up if symptoms do not improve or any worsening symptoms.  Symptomatic care and follow up discussed.  - doxycycline monohydrate (MONODOX) 100 MG capsule; Take 1 capsule (100 mg) by mouth 2 times daily for 7 days  Dispense: 14 capsule; Refill: 0    2. Need for prophylactic vaccination and inoculation against influenza    - INFLUENZA (HIGH DOSE) 3 VALENT VACCINE [56920]  - ADMIN INFLUENZA (For MEDICARE Patients ONLY) []     Home care instructions were reviewed with the patient. The risks, benefits and treatment options of prescribed medications or other treatments have been discussed with the patient. The patient verbalized their understanding and should call or follow up if no improvement or if they develop further problems.    Return in about 1 week (around 11/4/2019), or if symptoms  worsen or fail to improve.    MARYJANE Klein [facility]

## 2019-10-28 NOTE — PATIENT INSTRUCTIONS
Doxycycline sent to the pharmacy for symptoms    Follow up if symptoms do not improve or worsen.

## 2019-12-20 ENCOUNTER — TELEPHONE (OUTPATIENT)
Dept: FAMILY MEDICINE | Facility: CLINIC | Age: 74
End: 2019-12-20

## 2019-12-20 DIAGNOSIS — J06.9 UPPER RESPIRATORY TRACT INFECTION, UNSPECIFIED TYPE: Primary | ICD-10-CM

## 2019-12-20 RX ORDER — AZITHROMYCIN 250 MG/1
TABLET, FILM COATED ORAL
Qty: 6 TABLET | Refills: 0 | Status: SHIPPED | OUTPATIENT
Start: 2019-12-20 | End: 2020-04-09

## 2019-12-20 NOTE — TELEPHONE ENCOUNTER
"Reason for call:  Patient reporting a symptom    Symptom or request: Saige says she saw Mercy Guillen 10/28 and she put her on doxycycline monohydrate (MONODOX) 100 MG capsule. She says it never helped and she never got better. She says she has a dry cough  And sometimes coughs up dark green stuff but it has been happening more lately. I asked her how her breathing was and she says \"Good because I don't get up out of my chair\" States she has breathing problems when she moves around. She wonders if Dr Peters would give her a Z-nevin because that usually works for her.         Have you been treated for this before? Yes    Additional comments: [facility]    Phone Number patient can be reached at:  Home number on file [phone] (home)    Best Time:  anytime    Can we leave a detailed message on this number:  YES    Call taken on 12/20/2019 at 2:05 PM by Patrica King    "

## 2019-12-27 ENCOUNTER — TELEPHONE (OUTPATIENT)
Dept: FAMILY MEDICINE | Facility: CLINIC | Age: 74
End: 2019-12-27

## 2019-12-27 DIAGNOSIS — F32.0 MAJOR DEPRESSIVE DISORDER, SINGLE EPISODE, MILD (H): ICD-10-CM

## 2019-12-27 DIAGNOSIS — F41.1 GENERALIZED ANXIETY DISORDER: ICD-10-CM

## 2019-12-27 NOTE — TELEPHONE ENCOUNTER
"Requested Prescriptions   Pending Prescriptions Disp Refills     sertraline (ZOLOFT) 50 MG tablet [Pharmacy Med Name: Sertraline HCl 50 MG Oral Tablet]  0     Sig: TAKE 3 TABLETS BY MOUTH ONCE DAILY       SSRIs Protocol Failed - 12/27/2019 11:10 AM        Failed - PHQ-9 score less than 5 in past 6 months     Please review last PHQ-9 score.           Passed - Medication is active on med list        Passed - Patient is age 18 or older        Passed - No active pregnancy on record        Passed - No positive pregnancy test in last 12 months        Passed - Recent (6 mo) or future (30 days) visit within the authorizing provider's specialty     Patient had office visit in the last 6 months or has a visit in the next 30 days with authorizing provider or within the authorizing provider's specialty.  See \"Patient Info\" tab in inbasket, or \"Choose Columns\" in Meds & Orders section of the refill encounter.          sertraline (ZOLOFT) 50 MG tablet  Last Written Prescription Date:  05/13/2019  Last Fill Quantity: 270 tablet,  # refills: 1   Last office visit: 10/28/2019 with prescribing provider:  ROGELIO Guillen   Future Office Visit:      PHQ-9 SCORE 11/27/2017 1/18/2019 9/6/2019   PHQ-9 Total Score - - -   PHQ-9 Total Score MyChart - 7 (Mild depression) -   PHQ-9 Total Score 5 7 16     ROE-7 SCORE 11/27/2017 1/18/2019 9/6/2019   Total Score - - -   Total Score - 6 (mild anxiety) -   Total Score 6 6 9       Kelley Santizo RT (R) (M)      "

## 2019-12-27 NOTE — TELEPHONE ENCOUNTER
Requested Prescriptions   Pending Prescriptions Disp Refills     LORazepam (ATIVAN) 0.5 MG tablet [Pharmacy Med Name: LORazepam 0.5 MG Oral Tablet]  0     Sig: TAKE 1 TABLET BY MOUTH EVERY 8 HOURS       There is no refill protocol information for this order        Last Written Prescription Date:  6/3/19  Last Fill Quantity: 20,  # refills: 5   Last office visit: 10/28/2019 with prescribing provider:     Future Office Visit:      Routing refill request to provider for review/approval because:  Drug not on the G, P or [facility] refill protocol or controlled substance

## 2019-12-27 NOTE — TELEPHONE ENCOUNTER
FYI~ A refill has been made to the  assistance program for Advair Diskus & Ventolin HFA.    A 90 day supply of Advair Diskus & Ventolin HFA will be delivered to Saige's home within 7-10 business days.    Thank you,    Cheryl Pierre  Prescription Assistance

## 2019-12-30 RX ORDER — LORAZEPAM 0.5 MG/1
TABLET ORAL
Qty: 30 TABLET | Refills: 0 | Status: SHIPPED | OUTPATIENT
Start: 2019-12-30 | End: 2020-01-27

## 2019-12-30 NOTE — TELEPHONE ENCOUNTER
RX [place] reviewed:  reviewed- no concerns    [place]  [email or web address]

## 2020-01-18 ENCOUNTER — APPOINTMENT (OUTPATIENT)
Dept: CT IMAGING | Facility: CLINIC | Age: 75
End: 2020-01-18
Attending: EMERGENCY MEDICINE
Payer: MEDICARE

## 2020-01-18 ENCOUNTER — HOSPITAL ENCOUNTER (EMERGENCY)
Facility: CLINIC | Age: 75
Discharge: HOME | End: 2020-01-19
Attending: EMERGENCY MEDICINE | Admitting: EMERGENCY MEDICINE
Payer: MEDICARE

## 2020-01-18 ENCOUNTER — APPOINTMENT (OUTPATIENT)
Dept: GENERAL RADIOLOGY | Facility: CLINIC | Age: 75
End: 2020-01-18
Attending: EMERGENCY MEDICINE
Payer: MEDICARE

## 2020-01-18 DIAGNOSIS — R07.89 CHEST WALL PAIN: ICD-10-CM

## 2020-01-18 DIAGNOSIS — Z87.09 HISTORY OF COPD: ICD-10-CM

## 2020-01-18 LAB
ANION GAP SERPL CALCULATED.3IONS-SCNC: 6 MMOL/L (ref 3–14)
BASE EXCESS BLDV CALC-SCNC: 3.4 MMOL/L
BASOPHILS # BLD AUTO: 0.2 10E9/L (ref 0–0.2)
BASOPHILS NFR BLD AUTO: 1.3 %
BUN SERPL-MCNC: 17 MG/DL (ref 7–30)
CALCIUM SERPL-MCNC: 9 MG/DL (ref 8.5–10.1)
CHLORIDE SERPL-SCNC: 106 MMOL/L (ref 94–109)
CO2 SERPL-SCNC: 29 MMOL/L (ref 20–32)
CREAT SERPL-MCNC: 1.02 MG/DL (ref 0.52–1.04)
D DIMER PPP FEU-MCNC: 0.5 UG/ML FEU (ref 0–0.5)
DIFFERENTIAL METHOD BLD: ABNORMAL
EOSINOPHIL # BLD AUTO: 0.4 10E9/L (ref 0–0.7)
EOSINOPHIL NFR BLD AUTO: 3.5 %
ERYTHROCYTE [DISTWIDTH] IN BLOOD BY AUTOMATED COUNT: 12.4 % (ref 10–15)
GFR SERPL CREATININE-BSD FRML MDRD: 54 ML/MIN/{1.73_M2}
GLUCOSE SERPL-MCNC: 103 MG/DL (ref 70–99)
HCO3 BLDV-SCNC: 30 MMOL/L (ref 21–28)
HCT VFR BLD AUTO: 39.5 % (ref 35–47)
HGB BLD-MCNC: 12.2 G/DL (ref 11.7–15.7)
IMM GRANULOCYTES # BLD: 0.1 10E9/L (ref 0–0.4)
IMM GRANULOCYTES NFR BLD: 1.2 %
LYMPHOCYTES # BLD AUTO: 2 10E9/L (ref 0.8–5.3)
LYMPHOCYTES NFR BLD AUTO: 18.2 %
MCH RBC QN AUTO: 28.5 PG (ref 26.5–33)
MCHC RBC AUTO-ENTMCNC: 30.9 G/DL (ref 31.5–36.5)
MCV RBC AUTO: 92 FL (ref 78–100)
MONOCYTES # BLD AUTO: 0.9 10E9/L (ref 0–1.3)
MONOCYTES NFR BLD AUTO: 7.7 %
NEUTROPHILS # BLD AUTO: 7.6 10E9/L (ref 1.6–8.3)
NEUTROPHILS NFR BLD AUTO: 68.1 %
NRBC # BLD AUTO: 0 10*3/UL
NRBC BLD AUTO-RTO: 0 /100
O2/TOTAL GAS SETTING VFR VENT: 3 %
PCO2 BLDV: 52 MM HG (ref 40–50)
PH BLDV: 7.37 PH (ref 7.32–7.43)
PLATELET # BLD AUTO: 331 10E9/L (ref 150–450)
PO2 BLDV: 56 MM HG (ref 25–47)
POTASSIUM SERPL-SCNC: 4.1 MMOL/L (ref 3.4–5.3)
RBC # BLD AUTO: 4.28 10E12/L (ref 3.8–5.2)
SODIUM SERPL-SCNC: 141 MMOL/L (ref 133–144)
WBC # BLD AUTO: 11.2 10E9/L (ref 4–11)

## 2020-01-18 PROCEDURE — 80048 BASIC METABOLIC PNL TOTAL CA: CPT | Performed by: EMERGENCY MEDICINE

## 2020-01-18 PROCEDURE — 99285 EMERGENCY DEPT VISIT HI MDM: CPT | Mod: 25

## 2020-01-18 PROCEDURE — 71046 X-RAY EXAM CHEST 2 VIEWS: CPT

## 2020-01-18 PROCEDURE — 96360 HYDRATION IV INFUSION INIT: CPT | Mod: 59

## 2020-01-18 PROCEDURE — 74177 CT ABD & PELVIS W/CONTRAST: CPT

## 2020-01-18 PROCEDURE — 85379 FIBRIN DEGRADATION QUANT: CPT | Performed by: EMERGENCY MEDICINE

## 2020-01-18 PROCEDURE — 85025 COMPLETE CBC W/AUTO DIFF WBC: CPT | Performed by: EMERGENCY MEDICINE

## 2020-01-18 PROCEDURE — 93010 ELECTROCARDIOGRAM REPORT: CPT | Mod: Z6 | Performed by: EMERGENCY MEDICINE

## 2020-01-18 PROCEDURE — 25800030 ZZH RX IP 258 OP 636: Performed by: EMERGENCY MEDICINE

## 2020-01-18 PROCEDURE — 82803 BLOOD GASES ANY COMBINATION: CPT | Performed by: EMERGENCY MEDICINE

## 2020-01-18 PROCEDURE — 96361 HYDRATE IV INFUSION ADD-ON: CPT

## 2020-01-18 PROCEDURE — 99285 EMERGENCY DEPT VISIT HI MDM: CPT | Mod: 25 | Performed by: EMERGENCY MEDICINE

## 2020-01-18 PROCEDURE — 93005 ELECTROCARDIOGRAM TRACING: CPT

## 2020-01-18 RX ORDER — IOPAMIDOL 755 MG/ML
48 INJECTION, SOLUTION INTRAVASCULAR ONCE
Status: COMPLETED | OUTPATIENT
Start: 2020-01-18 | End: 2020-01-19

## 2020-01-18 RX ADMIN — SODIUM CHLORIDE 250 ML: 9 INJECTION, SOLUTION INTRAVENOUS at 23:17

## 2020-01-18 ASSESSMENT — MIFFLIN-ST. JEOR: SCORE: 871.03

## 2020-01-18 ASSESSMENT — ENCOUNTER SYMPTOMS
NEUROLOGICAL NEGATIVE: 1
HEMATOLOGIC/LYMPHATIC NEGATIVE: 1
SHORTNESS OF BREATH: 1
GASTROINTESTINAL NEGATIVE: 1
ALLERGIC/IMMUNOLOGIC NEGATIVE: 1
ENDOCRINE NEGATIVE: 1
PSYCHIATRIC NEGATIVE: 1
CONSTITUTIONAL NEGATIVE: 1
EYES NEGATIVE: 1
MUSCULOSKELETAL NEGATIVE: 1

## 2020-01-18 NOTE — ED AVS SNAPSHOT
[facility]  [address] [address]  Phone:  [phone]  Fax:  [phone]                                    Tari Wiley   MRN: [id number]    Department:  [facility]   Date of Visit:  1/18/2020           After Visit Summary Signature Page    I have received my discharge instructions, and my questions have been answered. I have discussed any challenges I see with this plan with the nurse or doctor.    ..........................................................................................................................................  Patient/Patient Representative Signature      ..........................................................................................................................................  Patient Representative Print Name and Relationship to Patient    ..................................................               ................................................  Date                                   Time    ..........................................................................................................................................  Reviewed by Signature/Title    ...................................................              ..............................................  Date                                               Time          22EPIC Rev 08/18

## 2020-01-19 VITALS
HEART RATE: 105 BPM | OXYGEN SATURATION: 96 % | RESPIRATION RATE: 20 BRPM | HEIGHT: 60 IN | DIASTOLIC BLOOD PRESSURE: 66 MMHG | TEMPERATURE: 97.6 F | SYSTOLIC BLOOD PRESSURE: 140 MMHG | BODY MASS INDEX: 19.24 KG/M2 | WEIGHT: 98 LBS

## 2020-01-19 PROCEDURE — 25000125 ZZHC RX 250: Performed by: EMERGENCY MEDICINE

## 2020-01-19 PROCEDURE — 74177 CT ABD & PELVIS W/CONTRAST: CPT

## 2020-01-19 PROCEDURE — 25000128 H RX IP 250 OP 636: Performed by: EMERGENCY MEDICINE

## 2020-01-19 RX ADMIN — IOPAMIDOL 48 ML: 755 INJECTION, SOLUTION INTRAVENOUS at 00:28

## 2020-01-19 RX ADMIN — SODIUM CHLORIDE 52 ML: 9 INJECTION, SOLUTION INTRAVENOUS at 00:28

## 2020-01-19 NOTE — ED NOTES
Pt arrives to the room on oxygen. Pt shows 84% FIo2 on room air. She increases to 94% on Oxygen at 3 LPM. She states that she has had URQ or higher pain with deep breaths for the last month. She said it radiates to her back. She states it is always there with any deep breath. She has had a lot of coughing recently. No fevers at home. She has had a productive cough with green phlegm. She has had recent falls getting tangled in her Oxygen hose. Daughter states that there has been confusion intermittently within the last 2 weeks.

## 2020-01-19 NOTE — DISCHARGE INSTRUCTIONS
1) The cause of your pain and discomfort is reported and described since beginning before Belle in 2019 is not clear.  Your imaging time and evaluation department today does not suggest a heart attack, blood clot in the lungs, lung infection, collapsed lung, or infection in your abdomen.    2) we have agreed to allow you to go home to continue take Advil for pain apply heat to the area of discomfort and to follow-up for reevaluation next 3 to 5 days if your pain worsens.  If you have new concerns you should return to the department to be reexamined.

## 2020-01-19 NOTE — ED PROVIDER NOTES
History     Chief Complaint   Patient presents with     Shortness of Breath     soa, pain in ribs with breathing, pt on o2 due COPD     HPI  Tari Wiley is a 74 year old female with history of acute respiratory failure, depression, hyperlipidemia, COPD exacerbation, and esophageal candidiasis who presents to the emergency department with shortness of breath and right rib pain radiating into her back. Patient reports pain started a little prior to [place].  She notes symptoms worsened the last couple of days and is agravated with inspiration and movement. Patient reports difficulty ambulating. Patient reports she is on oxygen at home at 2L/min, but recently has increased it up to 3L/min. She reports she is allergic to Sulfa and gets itchy and swellings in hands when taken. Patient states she quit smoking 25 years ago. Patient is followed by Dr. Peters.     Social History: Patient lives alone in [place], [place]. She presents with Caren briggs, by private car.     Allergies:  Allergies   Allergen Reactions     Sulfa Drugs Swelling and Difficulty breathing       Problem List:    Patient Active Problem List    Diagnosis Date Noted     Respiratory failure, acute (H) 05/30/2012     Priority: High     Pneumonia 05/28/2012     Priority: High     Esophageal candidiasis (H) 04/02/2019     Priority: Medium     Esophageal stenosis 04/02/2019     Priority: Medium     Mammogram declined 02/28/2017     Priority: Medium     Health Care Home-Not active 09/08/2014     Priority: Medium     State Tier Level:    Status:  Unable to reach  Care Coordinator:  Talita Peña    See Letters for [facility] Care Plan  Date:  September 8, 2014           Gastroenteritis 05/28/2012     Priority: Medium     Thrush, oral 11/12/2011     Priority: Medium     Anemia 11/11/2011     Priority: Medium     Generalized weakness 11/10/2011     Priority: Medium     Personal history of smoking 12/08/2010     Priority: Medium     Hyperlipidemia LDL  goal <130 10/31/2010     Priority: Medium     Mild major depression (H) 06/10/2005     Priority: Medium     2007 doing well on the current dose of Zoloft.        COPD (chronic obstructive pulmonary disease) (HCC) 06/10/2005     Priority: Medium     COPD exacerbation (H) 06/10/2005     Priority: Medium     Generalized anxiety disorder 2011     Priority: Low     Diagnosis updated by automated process. Provider to review and confirm.          Past Medical History:    Past Medical History:   Diagnosis Date     ROE (generalised anxiety disorder)      Hyperlipidemia LDL goal < 130      Tobacco abuse        Past Surgical History:    Past Surgical History:   Procedure Laterality Date     APPENDECTOMY       C/SECTION, LOW TRANSVERSE  1982    , Low Transverse     CATARACT IOL, RT/LT      right-2013     CHOLECYSTECTOMY, OPEN       ESOPHAGOSCOPY, GASTROSCOPY, DUODENOSCOPY (EGD), COMBINED N/A 2019    Procedure: COMBINED ESOPHAGOSCOPY, GASTROSCOPY, DUODENOSCOPY (EGD);  Surgeon: Justin Davila MD;  Location: [facility]     ESOPHAGOSCOPY, GASTROSCOPY, DUODENOSCOPY (EGD), DILATATION, COMBINED N/A 2019    Procedure: Upper Endoscopy With Dilation;  Surgeon: John Perez MD;  Location: [facility]     ESOPHAGOSCOPY, GASTROSCOPY, DUODENOSCOPY (EGD), RESECT MUCOSA, COMBINED N/A 3/1/2019    Procedure: Upper Endoscopy With EMR, and brushings;  Surgeon: John Perez MD;  Location: [facility]     SURGICAL HISTORY OF -       stent to biliary tract       Family History:    Family History   Problem Relation Age of Onset     Alcohol/Drug Father      Cancer Brother        Social History:  Marital Status:   [4]  Social History     Tobacco Use     Smoking status: Former Smoker     Packs/day: 4.00     Years: 36.00     Pack years: 144.00     Types: Cigarettes     Last attempt to quit: 1993     Years since quittin.1     Smokeless tobacco: Never Used     Tobacco comment: Started at age 12   Substance  Use Topics     Alcohol use: No     Drug use: No        Medications:    acetaminophen (TYLENOL) 500 MG tablet  ADVAIR DISKUS 500-50 MCG/DOSE inhaler  albuterol (2.5 MG/3ML) 0.083% neb solution  albuterol (PROAIR HFA/PROVENTIL HFA/VENTOLIN HFA) 108 (90 Base) MCG/ACT inhaler  buPROPion (WELLBUTRIN) 75 MG tablet  diphenhydrAMINE-acetaminophen (TYLENOL PM)  MG tablet  LORazepam (ATIVAN) 0.5 MG tablet  nystatin (MYCOSTATIN) 823732 UNIT/ML suspension  omeprazole (PRILOSEC) 40 MG DR capsule  order for DME  OXYGEN-HELIUM IN  sertraline (ZOLOFT) 50 MG tablet  SPIRIVA HANDIHALER 18 MCG inhaled capsule          Review of Systems   Constitutional: Negative.    HENT: Negative.    Eyes: Negative.    Respiratory: Positive for shortness of breath.    Cardiovascular: Positive for chest pain.   Gastrointestinal: Negative.    Endocrine: Negative.    Genitourinary: Negative.    Musculoskeletal: Negative.    Skin: Negative.    Allergic/Immunologic: Negative.    Neurological: Negative.    Hematological: Negative.    Psychiatric/Behavioral: Negative.    All other systems reviewed and are negative.      Physical Exam   BP: (!) 147/62  Pulse: 110  Temp: 97.6  F (36.4  C)  Height: 152.4 cm (5')  Weight: 44.5 kg (98 lb)  SpO2: 91 %      Physical Exam  HENT:      Head: Normocephalic and atraumatic.   Neck:      Musculoskeletal: Normal range of motion and neck supple.      Thyroid: No thyromegaly.      Vascular: No hepatojugular reflux or JVD.      Trachea: No tracheal deviation.   Cardiovascular:      Rate and Rhythm: Normal rate and regular rhythm.  No extrasystoles are present.     Pulses: No decreased pulses.      Heart sounds: No murmur. No friction rub. No gallop.    Pulmonary:      Effort: Pulmonary effort is normal. No respiratory distress.      Breath sounds: No decreased breath sounds.   Chest:      Chest wall: No mass, deformity, tenderness, crepitus or edema. There is no dullness to percussion.       Musculoskeletal:      Right  lower leg: She exhibits no tenderness. No edema.      Left lower leg: She exhibits no tenderness. No edema.   Lymphadenopathy:      Cervical: No cervical adenopathy.   Skin:     Coloration: Skin is not cyanotic or pale.      Findings: No ecchymosis, erythema or rash.      Nails: There is no clubbing.     Neurological:      General: No focal deficit present.      Mental Status: She is alert. She is disoriented.      Cranial Nerves: No cranial nerve deficit.      Motor: No weakness.   Psychiatric:         Mood and Affect: Mood normal. Mood is not anxious.         Behavior: Behavior normal. Behavior is not agitated.         ED Course        Procedures               EKG Interpretation:      Interpreted by Pablo Cardenas MD  Time reviewed:21:55  Symptoms at time of EKG: Right subcostal chest wall pain  Rhythm: normal sinus   Rate: Normal  Axis: Normal  Ectopy: none  Conduction: normal  ST Segments/ T Waves: Non-specific ST-T wave changes  Q Waves: nonspecific  Comparison to prior: When compared with EKG from 2/26/2019 sinus tachycardia is resolved.    Clinical Impression: no acute changes, normal sinus rhythm.        Critical Care time:  none               ED medications:  Medications   0.9% sodium chloride BOLUS (0 mLs Intravenous Stopped 1/19/20 0109)   iopamidol (ISOVUE-370) solution 48 mL (48 mLs Intravenous Given 1/19/20 0028)   sodium chloride 0.9 % bag 500mL for CT scan flush use (52 mLs Intravenous Given 1/19/20 0028)       ED Vitals:  Vitals:    01/18/20 2035 01/18/20 2154   BP: (!) 147/62    Pulse: 110    Temp: 97.6  F (36.4  C)    TempSrc: Oral    SpO2: 91% 96%   Weight: 44.5 kg (98 lb)    Height: 1.524 m (5')      Prior or recent diagnostic imaging and work-up:  Complete echocardiogram 10/30 /2013  Interpretation Summary  The visual ejection fraction is estimated at 55-60%. The right ventricle is   normal in size and function. The aortic valve is trileaflet with aortic valve    sclerosis.  PatientHeight: 60 in  PatientWeight: 121 lbs  SystolicPressure: 137 mmHg  DiastolicPressure: 67 mmHg  HeartRate: 96 bpm  BSA 1.5 m^2        Left Ventricle  The visual ejection fraction is estimated at 55-60%.  Grade I left ventricular diastolic dysfunction is noted.     Right Ventricle  The right ventricle is normal in size and function.     Atria  Normal left atrial size.  Right atrial size is normal.     Mitral Valve  The mitral valve is normal in structure and function.  There is trace mitral regurgitation.     Tricuspid Valve  The tricuspid valve is not well visualized, but is grossly normal.  There is trace tricuspid regurgitation.     Aortic Valve  The aortic valve is trileaflet with aortic valve sclerosis.  There is trace aortic regurgitation.     Pulmonic Valve  The pulmonic valve is not well seen, but is grossly normal.  There is trace pulmonic valvular regurgitation.     Vessels  Normal size aorta.  The inferior vena cava is not dilated.     Pericardium  There is no pericardial effusion.     Procedure  Complete Echo Adult.    ED labs and imaging:  Results for orders placed or performed during the hospital encounter of 01/18/20 (from the past 24 hour(s))   CBC with platelets differential   Result Value Ref Range    WBC 11.2 (H) 4.0 - 11.0 10e9/L    RBC Count 4.28 3.8 - 5.2 10e12/L    Hemoglobin 12.2 11.7 - 15.7 g/dL    Hematocrit 39.5 35.0 - 47.0 %    MCV 92 78 - 100 fl    MCH 28.5 26.5 - 33.0 pg    MCHC 30.9 (L) 31.5 - 36.5 g/dL    RDW 12.4 10.0 - 15.0 %    Platelet Count 331 150 - 450 10e9/L    Diff Method Automated Method     % Neutrophils 68.1 %    % Lymphocytes 18.2 %    % Monocytes 7.7 %    % Eosinophils 3.5 %    % Basophils 1.3 %    % Immature Granulocytes 1.2 %    Nucleated RBCs 0 0 /100    Absolute Neutrophil 7.6 1.6 - 8.3 10e9/L    Absolute Lymphocytes 2.0 0.8 - 5.3 10e9/L    Absolute Monocytes 0.9 0.0 - 1.3 10e9/L    Absolute Eosinophils 0.4 0.0 - 0.7 10e9/L    Absolute Basophils  0.2 0.0 - 0.2 10e9/L    Abs Immature Granulocytes 0.1 0 - 0.4 10e9/L    Absolute Nucleated RBC 0.0    Basic metabolic panel   Result Value Ref Range    Sodium 141 133 - 144 mmol/L    Potassium 4.1 3.4 - 5.3 mmol/L    Chloride 106 94 - 109 mmol/L    Carbon Dioxide 29 20 - 32 mmol/L    Anion Gap 6 3 - 14 mmol/L    Glucose 103 (H) 70 - 99 mg/dL    Urea Nitrogen 17 7 - 30 mg/dL    Creatinine 1.02 0.52 - 1.04 mg/dL    GFR Estimate 54 (L) >60 mL/min/[1.73_m2]    GFR Estimate If Black 63 >60 mL/min/[1.73_m2]    Calcium 9.0 8.5 - 10.1 mg/dL   Blood gas venous   Result Value Ref Range    Ph Venous 7.37 7.32 - 7.43 pH    PCO2 Venous 52 (H) 40 - 50 mm Hg    PO2 Venous 56 (H) 25 - 47 mm Hg    Bicarbonate Venous 30 (H) 21 - 28 mmol/L    Base Excess Venous 3.4 mmol/L    FIO2 3    D dimer quantitative   Result Value Ref Range    D Dimer 0.5 0.0 - 0.50 ug/ml FEU   Chest XR,  PA & LAT    Narrative    CHEST TWO VIEWS  1/18/2020 9:48 PM     HISTORY:  Shortness of breath.    COMPARISON: 5/13/2019.      Impression    IMPRESSION: Hyperinflation both lungs. The lungs are otherwise clear.  No pneumothorax. Heart size and pulmonary vascularity are within  normal limits. Aortic calcification.    MARLENE WREN MD   CT Abdomen Pelvis w Contrast    Narrative    EXAM: CT ABDOMEN PELVIS W CONTRAST  LOCATION: [facility]  DATE/TIME: 1/19/2020 12:27 AM    INDICATION:  COMPARISON: None.  TECHNIQUE: CT scan of the abdomen and pelvis was performed following injection of IV contrast. Multiplanar reformats were obtained. Dose reduction techniques were used.  CONTRAST: 48 mL Isovue-370.    FINDINGS:   LOWER CHEST: Emphysematous change.    HEPATOBILIARY: Pneumobilia. Absent gallbladder.    PANCREAS: Normal.    SPLEEN: Normal.    ADRENAL GLANDS: Normal.    KIDNEYS/BLADDER: Simple right renal cyst. No follow-up required. No obstructing renal or ureteral stones. No hydroureteronephrosis. Punctate nonobstructing right renal stone.    BOWEL:  Moderate volume retained feces. Scattered diverticula. No obstruction, colitis, or diverticulitis. Appendectomy.    LYMPH NODES: Normal.    VASCULATURE: Unremarkable.    PELVIC ORGANS: Normal.    OTHER: None.    MUSCULOSKELETAL: Diffuse osteopenia.      Impression    IMPRESSION:   1.  Diverticulosis. No obstruction, colitis, or diverticulitis.  2.  Atherosclerotic vascular disease.  3.  Status post cholecystectomy with pneumobilia.  4.  Mild volume retained feces, correlate for constipation.  5.  Punctate nonobstructing right renal stone. No obstructing renal or ureteral stones. No hydroureteronephrosis.           Assessments & Plan (with Medical Decision Making)   Clinical impression: 74-year-old female who presented with subacute/chronic shortness of breath and pleuritic right subcostal chest pain/Right upper quadrant pain of unclear cause.  She is discharged home with close outpatient follow-up after unrevealing ED course of care.  She has multiple medical diagnoses including history of COPD- oxygen dependent history of esophageal candidiasis and stenosis, history of anxiety and prior care for pneumonia and  respiratory failure.  She arrived by private car with her granddaughter reporting that since prior to about Christmas of 2019 she is developed right-sided subcostal discomfort that radiates to her back.  She is typically on 2 L of oxygen  but has had to increase it to 3 L with activity and exertion.  Former smoker quit over 20 years ago.  On my exam she appeared older than her stated age but was in no obvious distress she was 98% on 3 L nasal cannula, reduced her oxygen support to 2 L ( baseline), patient remained without respiratory distress(94-96% on 2L NC).  Pain was reproducible with palpation about the right subcostal area.  No hemoptysis.  No palpitations.  Patient could not recall her medications but review of her medical records indicates she is on Ativan, Zoloft, Spiriva, Advair, omeprazole,  albuterol.  She had equal breath sounds no rhonchi or crackles no wheezing appreciated on exam.  Patient had no abdominal tenderness on my examination.  Abdomen soft nondistended no rebound or guarding.  Well-healed right subhepatic incision.      ED course and Plan:  I reviewed the patient's medical records including her echocardiogram from October 2013 see detailed interpretation in summary above.  We discussed her subacute/chronic symptoms with reproducible chest wall tenderness.  With underlying history of COPD I considered the possibility of pneumothorax, pneumonia, rib fracture, costochondral process and PE.  We discussed options for diagnostic testing and work-up. On arrival patient is not acidotic.  Her white count is 11.2, hemoglobin was 12.2.  Dimer 0.5 age-adjusted is normal.  X-ray imaging on my independent review did not show any focal process specifically no pneumomediastinum,  no pneumothorax or pneumonia. Changes consistent with underlying history of COPD see detail in the interpreting radiologist report above.    We discussed her symptoms that are chronic and unremarkable work-up. We discussed the benefit of additional r imaging.  Patient requested additional imaging.  CT imaging of the abdomen pelvis with contrast obtained given lower chest wall pain and right upper quadrant discomfort.  CT did not show any acute process see detailed interpreting radiologist report above.  Patient is comfortable going home understanding that the cause of her pain is not clear however her work-up does not suggest an acute process requiring emergent intervention.  She is asked to follow-up in primary care clinic for recheck within the week if her symptoms do not improve or if there are new concerns.  Both the patient and her granddaughter who was present during her entire ED course of care expressed comfort, understanding, agreement of plan of care.Disclaimer: This note consists of symbols derived from keyboarding,  dictation and/or voice recognition software. As a result, there may be errors in the script that have gone undetected. Please consider this when interpreting information found in this chart.  I have reviewed the nursing notes.    I have reviewed the findings, diagnosis, plan and need for follow up with the patient.       New Prescriptions    No medications on file       Final diagnoses:   Chest wall pain - Chronic since December 2019   History of COPD   This document serves as a record of the services and decisions personally performed and made by Pablo Cardenas. The HPI was created on his behalf by Tamara Sneed, a trained medical scribe. The creation of the HPI is based on the provider's statements to the medical scribe.     Tamara Sneed 9:34 PM January 18, 2020    Provider:   The information in the HPI created by the medical scribe for me, accurately reflects the services I personally performed and the decisions made by me. The documentation of the review of systems, physical exam, and medical decision making is written by Pablo Cardenas. I have reviewed and approved this document for accuracy prior to leaving the patient care area.   Pablo Cardenas MD 9:34 PM January 18, 2020 1/18/2020   [facility] EMERGENCY DEPARTMENT     Pablo Cardenas MD  01/19/20 0116

## 2020-01-19 NOTE — ED NOTES
Labs drawn and sent. IV infiltrated immediately after labs drawn. Refusing placement of second site until MD is into see pt.

## 2020-01-22 DIAGNOSIS — F32.0 MAJOR DEPRESSIVE DISORDER, SINGLE EPISODE, MILD (H): ICD-10-CM

## 2020-01-22 ASSESSMENT — PATIENT HEALTH QUESTIONNAIRE - PHQ9: SUM OF ALL RESPONSES TO PHQ QUESTIONS 1-9: 10

## 2020-01-22 NOTE — TELEPHONE ENCOUNTER
"Requested Prescriptions   Pending Prescriptions Disp Refills     sertraline (ZOLOFT) 50 MG tablet [Pharmacy Med Name: Sertraline HCl 50 MG Oral Tablet]  0     Sig: TAKE 2 TABLETS BY MOUTH ONCE DAILY       SSRIs Protocol Failed - 1/22/2020 11:42 AM        Failed - PHQ-9 score less than 5 in past 6 months     Please review last PHQ-9 score.           Passed - Medication is active on med list        Passed - Patient is age 18 or older        Passed - No active pregnancy on record        Passed - No positive pregnancy test in last 12 months        Passed - Recent (6 mo) or future (30 days) visit within the authorizing provider's specialty     Patient had office visit in the last 6 months or has a visit in the next 30 days with authorizing provider or within the authorizing provider's specialty.  See \"Patient Info\" tab in inbasket, or \"Choose Columns\" in Meds & Orders section of the refill encounter.            Last Written Prescription Date:  12/30/19  Last Fill Quantity: 270,  # refills: 0   Last office visit: 10/28/2019 with prescribing provider:     Future Office Visit:      "

## 2020-02-12 ENCOUNTER — TELEPHONE (OUTPATIENT)
Dept: FAMILY MEDICINE | Facility: CLINIC | Age: 75
End: 2020-02-12

## 2020-02-12 NOTE — TELEPHONE ENCOUNTER
FYI~ February 12, 2020 I spoke with Saige's Niece Taylor, Saige is in need of financial assistance for medication.     We reviewed the Pharmacy Assistance Fund $500, the Prescription Assistance Program for manfacturer brand name assistance programs, gross income, Rx insurance and Rx list.     She stated not needed at this time the Pharmacy Assistance Fund.  I will be completing  assistance re-enrollment applications for Advair Diskus, Ventolin HFA, and Spiriva Handihaler .    Thank you,    Cheryl Pierre  Prescription Assistance

## 2020-02-19 ENCOUNTER — TELEPHONE (OUTPATIENT)
Dept: FAMILY MEDICINE | Facility: CLINIC | Age: 75
End: 2020-02-19

## 2020-02-19 NOTE — TELEPHONE ENCOUNTER
[facility] Patient assistance program application form signed by Dr Peters and send back inner office mail to Cheryl Pierre in the Pt assistance department via inner office mail.

## 2020-02-25 DIAGNOSIS — F41.1 GENERALIZED ANXIETY DISORDER: ICD-10-CM

## 2020-02-25 NOTE — TELEPHONE ENCOUNTER
Requested Prescriptions   Pending Prescriptions Disp Refills     LORazepam (ATIVAN) 0.5 MG tablet [Pharmacy Med Name: LORazepam 0.5 MG Oral Tablet]  0     Sig: TAKE 1 TABLET BY MOUTH EVERY 8 HOURS       There is no refill protocol information for this order             Last Written Prescription Date:  1/27/20  Last Fill Quantity: 30,   # refills: 0  Last Office Visit: 10/28/19  LEVI  Future Office visit:       Routing refill request to provider for review/approval because:  Drug not on the G, P or [facility] refill protocol or controlled substance

## 2020-02-26 NOTE — TELEPHONE ENCOUNTER
RX [place] ([place] reviewed:  reviewed- no concerns    [place]:  [email or web address]    Fill Date ID Written Drug Qty Days Prescriber Rx # Pharmacy Refill Daily Dose * Pymt Type    01/27/2020  1   01/27/2020  Lorazepam 0.5 Mg Tablet  30.00 10 Pa Daren  [id number]  [id number]  0/0 1.50 [place]   12/30/2019  1   12/30/2019  Lorazepam 0.5 Mg Tablet  30.00 10 Pa Daren  [id number]  [id number]  0/0 1.50 [place]   11/24/2019  1   06/03/2019  Lorazepam 0.5 Mg Tablet  20.00 7 Pa Daren  [id number]  [facility] ([id number]  4/5 1.43 [place]   11/05/2019  1   06/03/2019  Lorazepam 0.5 Mg Tablet  20.00 7 Pa Daren  [id number]  [id number]  3/5 1.43 [place]   10/27/2019  1   06/03/2019  Lorazepam 0.5 Mg Tablet  20.00 7 Pa Daren  [id number]  [id number]  2/5 1.43 [place]   10/17/2019  1   06/03/2019  Lorazepam 0.5 Mg Tablet  20.00 7 Pa Daren  [id number]  [id number]  1/5 1.43 [place]   09/27/2019  1   06/03/2019  Lorazepam 0.5 Mg Tablet  20.00 7 Pa Daren  [id number]  [id number]  0/5 1.43 [place]

## 2020-02-27 RX ORDER — LORAZEPAM 0.5 MG/1
TABLET ORAL
Qty: 20 TABLET | Refills: 0 | Status: SHIPPED | OUTPATIENT
Start: 2020-02-27 | End: 2020-03-18

## 2020-03-05 ENCOUNTER — TELEPHONE (OUTPATIENT)
Dept: FAMILY MEDICINE | Facility: CLINIC | Age: 75
End: 2020-03-05

## 2020-03-05 DIAGNOSIS — F32.0 MILD MAJOR DEPRESSION (H): Primary | ICD-10-CM

## 2020-03-05 RX ORDER — BUPROPION HYDROCHLORIDE 150 MG/1
150 TABLET, EXTENDED RELEASE ORAL 2 TIMES DAILY
Qty: 180 TABLET | Refills: 1 | Status: SHIPPED | OUTPATIENT
Start: 2020-03-05 | End: 2020-06-01

## 2020-03-05 NOTE — TELEPHONE ENCOUNTER
[facility] in [place] asking if her Bupropion 75 mg taking  2 tabs 2x daily to Bupropion 150 mg taking 1 twice daily.     New Rx needed.    Wellbutrin 75 mg Last filled 1/27/20 for #120 and 3 refills  Last office visit 10/28/19  Mercy Guillen

## 2020-03-13 DIAGNOSIS — J44.9 CHRONIC OBSTRUCTIVE PULMONARY DISEASE, UNSPECIFIED COPD TYPE (H): ICD-10-CM

## 2020-03-13 NOTE — TELEPHONE ENCOUNTER
I am in process of applying to  assistance programs  These programs require hand signed, brand name, hard copy scripts be submitted with their application.    Please hand sign BRAND name, hard copy scripts for:      SPIRIVA HANDIHALER      ADVAIR DISKUS      VENTOLIN HFA    Please send the script to me via interoffice mail Noy Camejo or via US mail  at:      [facility]   Noy Pickens   [address] [place] [place]  [address]    Thanks so much for your help!    Noy Pickens  Prescription   Pharmacy Assistance  [phone]

## 2020-03-15 RX ORDER — ALBUTEROL SULFATE 90 UG/1
2 AEROSOL, METERED RESPIRATORY (INHALATION) EVERY 6 HOURS PRN
Qty: 3 INHALER | Refills: 3 | Status: SHIPPED | OUTPATIENT
Start: 2020-03-15 | End: 2021-03-03

## 2020-03-15 RX ORDER — TIOTROPIUM BROMIDE 18 UG/1
CAPSULE ORAL; RESPIRATORY (INHALATION)
Qty: 90 CAPSULE | Refills: 3 | Status: SHIPPED | OUTPATIENT
Start: 2020-03-15 | End: 2021-03-03

## 2020-03-18 DIAGNOSIS — F41.1 GENERALIZED ANXIETY DISORDER: ICD-10-CM

## 2020-03-18 RX ORDER — LORAZEPAM 0.5 MG/1
TABLET ORAL
Qty: 20 TABLET | Refills: 1 | Status: SHIPPED | OUTPATIENT
Start: 2020-03-18 | End: 2020-04-08

## 2020-03-18 NOTE — TELEPHONE ENCOUNTER
Patient states her granddaughter is at [facility] in [place] to  her medications right now. Would like this filled as well as she doesn't know when she will be able to get down there again.     Lynette Deleon-Station

## 2020-03-24 ENCOUNTER — TELEPHONE (OUTPATIENT)
Dept: FAMILY MEDICINE | Facility: CLINIC | Age: 75
End: 2020-03-24

## 2020-03-24 NOTE — TELEPHONE ENCOUNTER
Saige has been approved to the [employer] assistance program for Advair Diskus & Ventolin HFA through December 31, 2020. She will receive this medication at no cost through the enrollment period.    A 90 day supply of Advair Diskus & Ventolin HFA will be delivered to Saige's home within 7-10 business days. She has been informed of this approval.    She will contact my office for refills as we must work directly with the .  I will note EPIC as each refill is requested.    Thank you,    Cheryl Pierre  Prescription Assistance

## 2020-03-26 ENCOUNTER — TELEPHONE (OUTPATIENT)
Dept: FAMILY MEDICINE | Facility: CLINIC | Age: 75
End: 2020-03-26

## 2020-03-26 NOTE — TELEPHONE ENCOUNTER
Saige has been approved to the [facility] assistance program for Spiriva Hanidhaler through December 31,2020.  She will receive this medication at no cost through the enrollment period.    A 90 day supply of Spiriva Handihaler will be delivered to Saige's home within 7-10 business days. She has been informed of this approval.    She will contact my office for refills as we must work directly with the .  I will note EPIC as each refill is requested.    Thank you,    Cheryl Pierre  Prescription Assistance

## 2020-04-07 DIAGNOSIS — F41.1 GENERALIZED ANXIETY DISORDER: ICD-10-CM

## 2020-04-07 NOTE — TELEPHONE ENCOUNTER
Requested Prescriptions   Pending Prescriptions Disp Refills     LORazepam (ATIVAN) 0.5 MG tablet [Pharmacy Med Name: LORazepam 0.5 MG Oral Tablet] 20 tablet 0     Sig: TAKE 1 TABLET BY MOUTH EVERY 8 HOURS       There is no refill protocol information for this order        Last Written Prescription Date:  3/18/20  Last Fill Quantity: 20,  # refills: 1   Last office visit: 10/28/2019 with prescribing provider:  AUTUMN Guillen NP  Future Office Visit:

## 2020-04-08 RX ORDER — LORAZEPAM 0.5 MG/1
TABLET ORAL
Qty: 20 TABLET | Refills: 0 | Status: SHIPPED | OUTPATIENT
Start: 2020-04-08 | End: 2020-04-29

## 2020-04-08 NOTE — TELEPHONE ENCOUNTER
RX monitoring program ([place]NPMP) reviewed:  reviewed- no concerns    MNPMP profile:  [email or web address]  PRESCRIPTIONS  Total Prescriptions: 16   Total Private Pay: 6   Fill Date ID Written Drug Qty Days Prescriber Rx # Pharmacy Refill Daily Dose * Pymt Type    03/18/2020  1   03/18/2020  Lorazepam 0.5 Mg Tablet  20.00 7 Pa Daren  [id number]  [id number]  0[place]1 1.[place]   02/27/2020  1   02/27/2020  Lorazepam 0.5 Mg Tablet  20.00 7 Pa Daren  [id number]  [id number]  [place]   01/27/2020  1   01/27/2020  Lorazepam 0.5 Mg Tablet  30.00 10 Pa Daren  [id number]  [id number]  [place] 1[place]   12/30/2019  1   12/30/2019  Lorazepam 0.5 Mg Tablet  30.00 10 Pa Daren  [id number]  [id number]  [place]   11/24/2019  1   06/03/2019  Lorazepam 0.5 Mg Tablet  20.00 7 Pa D[facility]  [id number]  [id number]  [place]   11/05/2019  1   06/03/2019  Lorazepam 0.5 Mg Tablet  20.00 7 Pa Daren  [id number]  [id number]  [place]   10/27/2019  1   06/03/2019  Lorazepam 0.5 Mg Tablet  20.00 7 Pa Daren  [id number]  [id number]  [place]   10/17/2019  1   06/03/2019  Lorazepam 0.5 Mg Tablet  20.00 7 Pa Daren  [id number]  [id number]  [place]   09/27/2019  1   06/03/2019  Lorazepam 0.5 Mg Tablet  20.00 7 Pa Daren  [id number]  [id number]  [place]   09/08/2019  1   05/29/2019  Lorazepam 0.5 Mg Tablet  20.00 5 Pa D[facility]  [id number]  [id number]  5/5 2.00 [place]   08/21/2019  1   05/29/2019  Lorazepam 0.5 Mg Tablet  20.00 5 Pa D[facility]  [id number]  [id number]  4/5 2.[place]   07/29/2019  1   05/29/2019  Lorazepam 0.5 Mg Tablet  20.00 5 Pa D[facility]  [id number]  [id number]  3/5 2.[place]   07/15/2019  1   05/29/2019  Lorazepam 0.5 Mg Tablet  20.00 5 Pa D[facility]  [id number]  [id number]  2/5 2.[place]   06/27/2019  1   05/29/2019  Lorazepam 0.5 Mg Tablet  20.00 5 Pa D[facility]  [id number]  [id number]  1/5 2.[place]    05/30/2019  1   05/29/2019  Lorazepam 0.5 Mg Tablet  20.00 5 Pa Daren  [id number]  [id number]  [place] 2.[place]   04/19/2019  1   04/02/2019  Lorazepam 0.5 Mg Tablet  20.00 6 Pa Daren  [id number]  [id number]  [place] 1.[place]

## 2020-04-09 ENCOUNTER — TELEPHONE (OUTPATIENT)
Dept: FAMILY MEDICINE | Facility: CLINIC | Age: 75
End: 2020-04-09

## 2020-04-09 DIAGNOSIS — J44.1 CHRONIC OBSTRUCTIVE PULMONARY DISEASE WITH ACUTE EXACERBATION (H): ICD-10-CM

## 2020-04-09 DIAGNOSIS — J06.9 UPPER RESPIRATORY TRACT INFECTION, UNSPECIFIED TYPE: ICD-10-CM

## 2020-04-09 RX ORDER — AZITHROMYCIN 250 MG/1
TABLET, FILM COATED ORAL
Qty: 6 TABLET | Refills: 0 | Status: SHIPPED | OUTPATIENT
Start: 2020-04-09 | End: 2020-05-07

## 2020-04-09 RX ORDER — PREDNISONE 20 MG/1
20 TABLET ORAL 2 TIMES DAILY
Qty: 10 TABLET | Refills: 0 | Status: SHIPPED | OUTPATIENT
Start: 2020-04-09 | End: 2020-05-07

## 2020-04-09 NOTE — TELEPHONE ENCOUNTER
"Reason for call:  Patient reporting a symptom    Symptom or request: Patient is reporting that she has a cough, feels like she is getting pneumonia again.  She states that she never goes out and has not been around anyone since before Belle.  States has had the \"cough for a long time, 4 months\".  Patient would like a medication for this.  Does not have option to do [facility] and was very addiment about speaking with Dr. Peters about this.  No fever or nasal congestion.    Duration (how long have symptoms been present): Ongoing    Have you been treated for this before? No    Additional comments:     Phone Number patient can be reached at:  Home number on file [phone] (home)    Best Time:  Any    Can we leave a detailed message on this number:  YES    Call taken on 4/9/2020 at 12:19 PM by Rehana Fuentes  "

## 2020-04-09 NOTE — TELEPHONE ENCOUNTER
Saige says she is having her usual COPD flare up symptoms and wants to get started on medication before it gets worse.

## 2020-04-28 DIAGNOSIS — F32.0 MAJOR DEPRESSIVE DISORDER, SINGLE EPISODE, MILD (H): ICD-10-CM

## 2020-04-28 DIAGNOSIS — F41.1 GENERALIZED ANXIETY DISORDER: ICD-10-CM

## 2020-04-28 DIAGNOSIS — B37.0 THRUSH: ICD-10-CM

## 2020-04-28 DIAGNOSIS — K21.9 GASTROESOPHAGEAL REFLUX DISEASE, ESOPHAGITIS PRESENCE NOT SPECIFIED: ICD-10-CM

## 2020-04-28 RX ORDER — NYSTATIN 100000/ML
SUSPENSION, ORAL (FINAL DOSE FORM) ORAL
Qty: 100 ML | Refills: 3 | Status: SHIPPED | OUTPATIENT
Start: 2020-04-28 | End: 2021-01-21

## 2020-04-28 RX ORDER — OMEPRAZOLE 40 MG/1
CAPSULE, DELAYED RELEASE ORAL
Qty: 90 CAPSULE | Refills: 3 | Status: SHIPPED | OUTPATIENT
Start: 2020-04-28 | End: 2021-02-12

## 2020-04-28 NOTE — TELEPHONE ENCOUNTER
Pt's niece Taylor Devlin is helping Saige get her medications cheaper. She will be using [facility] for some of her medications and Taylor will call them to set up an account.         She says Saige has high anxiety and a pharmacist she has been talking to wondered if maybe the Bupropion could be causing this. This one is also the most expensive of her meds. She wondered if Dr Peters would want her to stop taking it or change to something else. She is also on Zoloft for depression.  If we can let Taylor know, She is the one that is helping her set up her pills.

## 2020-04-28 NOTE — TELEPHONE ENCOUNTER
Pt's niece says Saige is picking up her Lorazepam today. She asked if she could have Rx sent to [facility] in [place] to have on file.

## 2020-04-29 RX ORDER — LORAZEPAM 0.5 MG/1
0.5 TABLET ORAL EVERY 8 HOURS PRN
Qty: 20 TABLET | Refills: 0 | Status: SHIPPED | OUTPATIENT
Start: 2020-04-29 | End: 2020-06-01

## 2020-05-07 ENCOUNTER — VIRTUAL VISIT (OUTPATIENT)
Dept: FAMILY MEDICINE | Facility: CLINIC | Age: 75
End: 2020-05-07
Payer: MEDICARE

## 2020-05-07 DIAGNOSIS — F32.0 MAJOR DEPRESSIVE DISORDER, SINGLE EPISODE, MILD (H): ICD-10-CM

## 2020-05-07 PROCEDURE — 99443 ZZC PHYSICIAN TELEPHONE EVALUATION 21-30 MIN: CPT | Performed by: FAMILY MEDICINE

## 2020-05-07 RX ORDER — BUPROPION HYDROCHLORIDE 75 MG/1
TABLET ORAL
Qty: 120 TABLET | Refills: 0 | Status: SHIPPED | OUTPATIENT
Start: 2020-05-07 | End: 2020-06-01

## 2020-05-07 RX ORDER — BUPROPION HYDROCHLORIDE 75 MG/1
TABLET ORAL
Qty: 360 TABLET | Refills: 3 | Status: SHIPPED | OUTPATIENT
Start: 2020-05-07 | End: 2021-06-10

## 2020-05-07 ASSESSMENT — ANXIETY QUESTIONNAIRES
1. FEELING NERVOUS, ANXIOUS, OR ON EDGE: NEARLY EVERY DAY
5. BEING SO RESTLESS THAT IT IS HARD TO SIT STILL: NOT AT ALL
GAD7 TOTAL SCORE: 18
7. FEELING AFRAID AS IF SOMETHING AWFUL MIGHT HAPPEN: NEARLY EVERY DAY
6. BECOMING EASILY ANNOYED OR IRRITABLE: NEARLY EVERY DAY
3. WORRYING TOO MUCH ABOUT DIFFERENT THINGS: NEARLY EVERY DAY
2. NOT BEING ABLE TO STOP OR CONTROL WORRYING: NEARLY EVERY DAY

## 2020-05-07 ASSESSMENT — PATIENT HEALTH QUESTIONNAIRE - PHQ9
5. POOR APPETITE OR OVEREATING: NEARLY EVERY DAY
SUM OF ALL RESPONSES TO PHQ QUESTIONS 1-9: 14

## 2020-05-07 NOTE — PATIENT INSTRUCTIONS
Sent Rx for 75mg tabs taking 2 tabs twice a [facility] and [facility]     Will contact randall knowles for Rx help     Let me know if this dosing does not help

## 2020-05-07 NOTE — PROGRESS NOTES
"Tari Wiley is a 75 year old female who is being evaluated via a billable telephone visit.      The patient has been notified of following:     \"This telephone visit will be conducted via a call between you and your physician/provider. We have found that certain health care needs can be provided without the need for a physical exam.  This service lets us provide the care you need with a short phone conversation.  If a prescription is necessary we can send it directly to your pharmacy.  If lab work is needed we can place an order for that and you can then stop by our lab to have the test done at a later time.    Telephone visits are billed at different rates depending on your insurance coverage. During this emergency period, for some insurers they may be billed the same as an in-person visit.  Please reach out to your insurance provider with any questions.    If during the course of the call the physician/provider feels a telephone visit is not appropriate, you will not be charged for this service.\"    Patient has given verbal consent for Telephone visit?  Yes    What phone number would you like to be contacted at? [phone]     How would you like to obtain your AVS? Mail a copy    Subjective     Tari Wiley is a 75 year old female who presents to clinic today for the following health issues:    HPI     Pt can not swallow the Wellbutrin SR   She needs the 75mg tabs so she can crush them   She has had to take a lower dose for the past few days due to not having the RX and feels more anxiety and lower mood  She would like to go back to the 150mg bupropion bid but can not afford the cost       Depression and Anxiety Follow-Up    How are you doing with your depression since your last visit? No change    How are you doing with your anxiety since your last visit?  Worsened     Are you having other symptoms that might be associated with depression or anxiety? No    Have you had a significant life event? Grief " or Loss     Do you have any concerns with your use of alcohol or other drugs? No    Social History     Tobacco Use     Smoking status: Former Smoker     Packs/day: 4.00     Years: 36.00     Pack years: 144.00     Types: Cigarettes     Last attempt to quit: 1993     Years since quittin.4     Smokeless tobacco: Never Used     Tobacco comment: Started at age 12   Substance Use Topics     Alcohol use: No     Drug use: No     PHQ 2019   PHQ-9 Total Score 16 10 14   Q9: Thoughts of better off dead/self-harm past 2 weeks Several days Not at all Not at all     ROE-7 SCORE 2019   Total Score - - -   Total Score 6 (mild anxiety) - -   Total Score 6 9 18     Last PHQ-9 2020   1.  Little interest or pleasure in doing things 3   2.  Feeling down, depressed, or hopeless 2   3.  Trouble falling or staying asleep, or sleeping too much 0   4.  Feeling tired or having little energy 3   5.  Poor appetite or overeating 3   6.  Feeling bad about yourself 3   7.  Trouble concentrating 0   8.  Moving slowly or restless 0   Q9: Thoughts of better off dead/self-harm past 2 weeks 0   PHQ-9 Total Score 14   Difficulty at work, home, or with people -     ROE-7  2020   1. Feeling nervous, anxious, or on edge 3   2. Not being able to stop or control worrying 3   3. Worrying too much about different things 3   4. Trouble relaxing 3   5. Being so restless that it is hard to sit still 0   6. Becoming easily annoyed or irritable 3   7. Feeling afraid, as if something awful might happen 3   ROE-7 Total Score 18     In the past two weeks have you had thoughts of suicide or self-harm?  No.    Do you have concerns about your personal safety or the safety of others?   No    Suicide Assessment Five-step Evaluation and Treatment (SAFE-T)                       Reviewed and updated as needed this visit by Provider  Tobacco  Allergies  Meds  Problems  Med Hx  Surg Hx  Fam Hx          Review of Systems   ROS COMP: Constitutional, HEENT, cardiovascular, pulmonary, gi and gu systems are negative, except as otherwise noted.       Objective   Reported vitals:  LMP  (LMP Unknown)    healthy, alert and no distress  PSYCH: Alert and oriented times 3; coherent speech, normal   rate and volume, able to articulate logical thoughts, able   to abstract reason, no tangential thoughts, no hallucinations   or delusions  Her affect is normal  RESP: No cough, no audible wheezing, able to talk in full sentences  Remainder of exam unable to be completed due to telephone visits    Diagnostic Test Results:  Labs reviewed in Epic        Assessment/Plan:  1. Major depressive disorder, single episode, mild (H)  Will try and get some help for her with cost   The SR is cheaper but she can not swallow   Needs the 75 mg tabs to crush but this is too expensive she can not afford this   - buPROPion (WELLBUTRIN) 75 MG tablet; TAKE TWO TABLETS BY MOUTH TWICE DAILY  Dispense: 360 tablet; Refill: 3  - buPROPion (WELLBUTRIN) 75 MG tablet; TAKE TWO TABLETS BY MOUTH TWICE DAILY  Dispense: 120 tablet; Refill: 0    Return in about 3 months (around 8/7/2020).      Phone call duration:  25 minutes    Suzy Peters MD

## 2020-05-08 ASSESSMENT — ANXIETY QUESTIONNAIRES: GAD7 TOTAL SCORE: 18

## 2020-06-01 ENCOUNTER — TELEPHONE (OUTPATIENT)
Dept: FAMILY MEDICINE | Facility: CLINIC | Age: 75
End: 2020-06-01

## 2020-06-01 DIAGNOSIS — F32.0 MAJOR DEPRESSIVE DISORDER, SINGLE EPISODE, MILD (H): ICD-10-CM

## 2020-06-01 DIAGNOSIS — J06.9 URI (UPPER RESPIRATORY INFECTION): Primary | ICD-10-CM

## 2020-06-01 DIAGNOSIS — F41.1 GENERALIZED ANXIETY DISORDER: ICD-10-CM

## 2020-06-01 RX ORDER — PREDNISONE 20 MG/1
40 TABLET ORAL DAILY
Qty: 10 TABLET | Refills: 0 | Status: SHIPPED | OUTPATIENT
Start: 2020-06-01 | End: 2020-08-14

## 2020-06-01 RX ORDER — LORAZEPAM 0.5 MG/1
TABLET ORAL
Qty: 20 TABLET | Refills: 0 | Status: SHIPPED | OUTPATIENT
Start: 2020-06-01 | End: 2020-06-22

## 2020-06-01 RX ORDER — AZITHROMYCIN 250 MG/1
TABLET, FILM COATED ORAL
Qty: 6 TABLET | Refills: 0 | Status: SHIPPED | OUTPATIENT
Start: 2020-06-01 | End: 2020-06-06

## 2020-06-01 NOTE — TELEPHONE ENCOUNTER
RX monitoring program ([place]NPMP) reviewed:  reviewed- no concerns    MNPMP profile:  [email or web address]      Fill Date ID Written Drug Qty Days Prescriber Rx # Pharmacy Refill Daily Dose * Pymt Type    05/09/2020  1   04/29/2020  Lorazepam 0.5 Mg Tablet  20.00 7 Pa Daren  [id number]  [id number]  [place]   04/28/2020  1   04/08/2020  Lorazepam 0.5 Mg Tablet  20.00 7 Pa Daren  [id number]  [id number]  [place] 1.[place]   04/07/2020  1   03/18/2020  Lorazepam 0.5 Mg Tablet  20.00 7 Pa Daren  [id number]  [id number]  1/1 1.[place]   03/18/2020  1   03/18/2020  Lorazepam 0.5 Mg Tablet  20.00 7 Pa Daren  [id number]  [id number]  [place]   02/27/2020  1   02/27/2020  Lorazepam 0.5 Mg Tablet  20.00 7 Pa Daren  [id number]  [id number]  [place]   01/27/2020  1   01/27/2020  Lorazepam 0.5 Mg Tablet  30.00 10 Pa Daren  [id number]  [id number]  [place] 1[place]   12/30/2019  1   12/30/2019  Lorazepam 0.5 Mg Tablet  30.00 10 Pa Daren  [id number]  [id number]  [place]   11/24/2019  1   06/03/2019  Lorazepam 0.5 Mg Tablet  20.00 7 Pa Daren  [id number]  [id number]  4/5 1.[place]

## 2020-06-01 NOTE — TELEPHONE ENCOUNTER
Reason for call:  Patient reporting a symptom    Symptom or request: Cough, Headache, Sore Throat. These symptoms started a week ago. Pt thought it was her Alergies.  Pt said she usually just calls in and Dr. Peters will call in Antibiotic into the pharmacy. Please Advise    Phone Number patient can be reached at:  Home number on file [phone] (home)    Best Time:  Any Time      Can we leave a detailed message on this number:  YES    Call taken on 6/1/2020 at 12:00 PM by Samantha Cardenas

## 2020-06-01 NOTE — TELEPHONE ENCOUNTER
Pt called. States she has COPD, states she is coughing up a green sputum, headache, and sore throat. States she get these symptoms with COPD exacerbations. Offered and refused a virtual appointment states Dr Peters always treats her. States also she has been on Sertraline 150 mg for years but it was changed to 100 mg on 1/22/20. Pt has continued to take 150 mg she doesn't know why it has been changed. Kate Willard RN

## 2020-06-09 DIAGNOSIS — F32.0 MAJOR DEPRESSIVE DISORDER, SINGLE EPISODE, MILD (H): ICD-10-CM

## 2020-06-09 RX ORDER — BUPROPION HYDROCHLORIDE 75 MG/1
TABLET ORAL
Qty: 120 TABLET | Refills: 0 | OUTPATIENT
Start: 2020-06-09

## 2020-06-18 DIAGNOSIS — F41.1 GENERALIZED ANXIETY DISORDER: ICD-10-CM

## 2020-06-18 RX ORDER — LORAZEPAM 0.5 MG/1
TABLET ORAL
Qty: 20 TABLET | Refills: 0 | OUTPATIENT
Start: 2020-06-18

## 2020-06-18 NOTE — TELEPHONE ENCOUNTER
Routing refill request to provider for review/approval because:  Drug not on the [facility] refill protocol     Last Written Prescription Date:  6/1/2020  Last Fill Quantity: 20,  # refills: 0   Last office visit: 5/7/2020 with prescribing provider:  Dr Peters   Future Office Visit:      Claudia MCCOY RN, BSN

## 2020-06-18 NOTE — TELEPHONE ENCOUNTER
Saige says the reason she wanted it today is that her granddaughter works at [employer] and was working today and won't be back again until next Tuesday. She has 4 tablets left and doesn't need to take it every day so says she should be OK to wait for Dr Peters to return on Monday to send in the refill.

## 2020-06-22 ENCOUNTER — TELEPHONE (OUTPATIENT)
Dept: FAMILY MEDICINE | Facility: CLINIC | Age: 75
End: 2020-06-22

## 2020-06-22 DIAGNOSIS — F32.0 MAJOR DEPRESSIVE DISORDER, SINGLE EPISODE, MILD (H): ICD-10-CM

## 2020-06-22 DIAGNOSIS — F41.1 GENERALIZED ANXIETY DISORDER: ICD-10-CM

## 2020-06-22 RX ORDER — LORAZEPAM 0.5 MG/1
TABLET ORAL
Qty: 20 TABLET | Refills: 0 | Status: SHIPPED | OUTPATIENT
Start: 2020-06-22 | End: 2020-07-10

## 2020-06-22 NOTE — TELEPHONE ENCOUNTER
Reason for Call:  Other prescription    Detailed comments: Pt is calling on the following medication and wanting it to go to [facility].   Disp  Refills  Start  End  GATO    sertraline (ZOLOFT) 50 MG tablet  180 tablet  0  6/1/2020   No    Sig - Route: Take 3 tablets (150 mg) by mouth daily - Oral    Sent to pharmacy as: Sertraline HCl 50 MG Oral Tablet (ZOLOFT)    Class: E-Prescribe    Order: [id number]    E-Prescribing Status: Receipt confirmed by pharmacy (6/1/2020  3:22 PM CDT)    Printout Tracking     External Result Report    Pharmacy     [facility] - [place], [place] - [address]          Phone Number Patient can be reached at: Home number on file [phone] (home)    Best Time: any    Can we leave a detailed message on this number? YES    Call taken on 6/22/2020 at 3:48 PM by Maria Luz suresh

## 2020-06-23 RX ORDER — LORAZEPAM 0.5 MG/1
TABLET ORAL
Qty: 20 TABLET | Refills: 0 | OUTPATIENT
Start: 2020-06-23

## 2020-07-01 ENCOUNTER — TELEPHONE (OUTPATIENT)
Dept: FAMILY MEDICINE | Facility: CLINIC | Age: 75
End: 2020-07-01

## 2020-07-01 DIAGNOSIS — F32.0 MAJOR DEPRESSIVE DISORDER, SINGLE EPISODE, MILD (H): ICD-10-CM

## 2020-07-01 NOTE — TELEPHONE ENCOUNTER
FYI~ A refill has been made to the  assistance program for Advair Diskus 500/50 & Spiriva Handihaler.    A 90 day supply of Advair Diskus 500/50 & Spiriva Handihaler  will be delivered to Saige's home within 7-10 business days.    Thank you,    Cheryl Pierre  Prescription Assistance

## 2020-07-10 DIAGNOSIS — F41.1 GENERALIZED ANXIETY DISORDER: ICD-10-CM

## 2020-07-10 RX ORDER — LORAZEPAM 0.5 MG/1
TABLET ORAL
Qty: 20 TABLET | Refills: 0 | Status: SHIPPED | OUTPATIENT
Start: 2020-07-10 | End: 2020-07-31

## 2020-07-10 NOTE — TELEPHONE ENCOUNTER
Routing refill request to provider for review/approval because:  Drug not on the [facility] refill protocol     Marjorie Levine RN

## 2020-07-30 DIAGNOSIS — F41.1 GENERALIZED ANXIETY DISORDER: ICD-10-CM

## 2020-07-30 NOTE — TELEPHONE ENCOUNTER
Requested Prescriptions   Pending Prescriptions Disp Refills     LORazepam (ATIVAN) 0.5 MG tablet [Pharmacy Med Name: LORazepam 0.5 MG Oral Tablet] 20 tablet 0     Sig: TAKE 1 TABLET BY MOUTH EVERY 8 HOURS AS NEEDED FOR ANXIETY       There is no refill protocol information for this order        Last Written Prescription Date:  7/10/20  Last Fill Quantity: 20,  # refills: 0   Last office visit: 10/28/2019 with prescribing provider:     Future Office Visit:        RX monitoring program ([place]NPMP) reviewed:  reviewed- no concerns    MNPMP profile:  [email or web address]

## 2020-07-31 RX ORDER — LORAZEPAM 0.5 MG/1
TABLET ORAL
Qty: 20 TABLET | Refills: 0 | Status: SHIPPED | OUTPATIENT
Start: 2020-07-31 | End: 2020-08-20

## 2020-08-14 ENCOUNTER — TELEPHONE (OUTPATIENT)
Dept: FAMILY MEDICINE | Facility: CLINIC | Age: 75
End: 2020-08-14

## 2020-08-14 DIAGNOSIS — J44.0 CHRONIC OBSTRUCTIVE PULMONARY DISEASE WITH ACUTE LOWER RESPIRATORY INFECTION (H): Primary | ICD-10-CM

## 2020-08-14 DIAGNOSIS — J06.9 URI (UPPER RESPIRATORY INFECTION): ICD-10-CM

## 2020-08-14 RX ORDER — AZITHROMYCIN 250 MG/1
TABLET, FILM COATED ORAL
Qty: 6 TABLET | Refills: 0 | Status: SHIPPED | OUTPATIENT
Start: 2020-08-14 | End: 2020-10-14

## 2020-08-14 RX ORDER — PREDNISONE 20 MG/1
40 TABLET ORAL DAILY
Qty: 10 TABLET | Refills: 0 | Status: SHIPPED | OUTPATIENT
Start: 2020-08-14 | End: 2020-10-14

## 2020-08-14 NOTE — TELEPHONE ENCOUNTER
Reason for call:  Patient reporting a symptom    Symptom or request: Lungs coughing up Green Phlegm. Pt has COPD  Pt had a dry cough for a few weeks.    Phone Number patient can be reached at:  Home number on file [phone] (home)    Best Time: Any Time        Can we leave a detailed message on this number:  YES    Call taken on 8/14/2020 at 2:01 PM by Samantha Cardenas

## 2020-08-14 NOTE — TELEPHONE ENCOUNTER
Spoke to the patient, says she is not feeling well, dry coughing for 3 weeks and now has more green phlegm, says she makes a wheezing sound, normally is short of breath, says she has no temp. Reports she has no change in taste. Has not been out of her house since Belle, grand-daughter brings her food.     Advised ER but the patient declines.     Huddled with Dr. Peters, PCP says to send the encounter to her and she will review.    Called patient and told her Dr. Peters will review, did tell the patient again if becomes worse and shortness of breath worsens then needs to go to the ER then, patient says she will.      CECY Aponte

## 2020-08-19 DIAGNOSIS — F41.1 GENERALIZED ANXIETY DISORDER: ICD-10-CM

## 2020-08-20 RX ORDER — LORAZEPAM 0.5 MG/1
TABLET ORAL
Qty: 20 TABLET | Refills: 0 | Status: SHIPPED | OUTPATIENT
Start: 2020-08-20 | End: 2020-09-10

## 2020-08-20 NOTE — TELEPHONE ENCOUNTER
"Spoke with pt. She says she has a few left, \"I like to order them early because she's not always there.\"  She had a death in the family and \"I've been freaking out.\"  Asked if she would like to schedule a visit with Dr. Peters: \"No I'm fine.\" She has not left the house since Belle. \"Every little thing bothers me. I think it's just from being cooped up.\"   She says just going out to walk the dog \"I get nervous and freak out. I don't know why I have the attacks.\"  Her granddaughter comes by every day because her horses are there, so she is not alone.    Confirmed she is taking sertraline (ZOLOFT) 3 tablets (150 mg) by mouth daily and buPROPion (WELLBUTRIN) 75 MG tablet TWO TABLETS BY MOUTH TWICE DAILY.  Asked if when she takes the lorazepam if it helps: \"Yeah a little bit. It just calms me a little more.\"     Claudia MCCOY, RN, BSN      "

## 2020-08-20 NOTE — TELEPHONE ENCOUNTER
I did refill this for pt but please call and triage. Does she need virtual visit to review status? She did just have this refilled 2 weeks ago.

## 2020-09-08 DIAGNOSIS — F41.1 GENERALIZED ANXIETY DISORDER: ICD-10-CM

## 2020-09-09 NOTE — TELEPHONE ENCOUNTER
Requested Prescriptions   Pending Prescriptions Disp Refills     LORazepam (ATIVAN) 0.5 MG tablet [Pharmacy Med Name: LORazepam 0.5 MG Oral Tablet] 20 tablet 0     Sig: TAKE 1 TABLET BY MOUTH EVERY 8 HOURS AS NEEDED FOR ANXIETY       There is no refill protocol information for this order        Last Written Prescription Date:  8/20/20  Last Fill Quantity: 20,  # refills: 0   Last office visit: 10/28/2019 with prescribing provider:     Future Office Visit:          RX monitoring program ([place]NPMP) reviewed:     [place]:  [email or web address]  PRESCRIPTIONS  Total Prescriptions: 17   Total Private Pay: 0   Fill Date ID Written Drug Qty Days Prescriber Rx # Pharmacy Refill Daily Dose * Pymt Type    08/20/2020  1   08/20/2020  Lorazepam 0.5 Mg Tablet  20.00 7 Pa Daren  [id number]  [id number]  [place] 1.[place]   07/31/2020  1   07/31/2020  Lorazepam 0.5 Mg Tablet  20.00 7 Pa Daren  [id number]  [id number]  [place]   07/11/2020  1   07/10/2020  Lorazepam 0.5 Mg Tablet  20.00 7 Pa Daren  [id number]  [id number]  [place]   06/23/2020  1   06/22/2020  Lorazepam 0.5 Mg Tablet  20.00 7 Pa Daren  [id number]  [id number]  [place]   06/01/2020  1   06/01/2020  Lorazepam 0.5 Mg Tablet  20.00 7 Pa Daren  [id number]  [id number]  [place]   05/09/2020  1   04/29/2020  Lorazepam 0.5 Mg Tablet  20.00 7 Pa Daren  [id number]  [id number]  [place]   04/28/2020  1   04/08/2020  Lorazepam 0.5 Mg Tablet  20.00 7 Pa Daren  [id number]  [id number]  [place]   04/07/2020  1   03/18/2020  Lorazepam 0.5 Mg Tablet  20.00 7 Pa Daren  [id number]  [id number]  1/1 [place]   03/18/2020  1   03/18/2020  Lorazepam 0.5 Mg Tablet  20.00 7 Pa Daren  [id number]  [id number]  0/1 1.[place]   02/27/2020  1   02/27/2020  Lorazepam 0.5 Mg Tablet  20.00 7 Pa Daren  [id number]  [id number]  [place]   01/27/2020  1   01/27/2020  Lorazepam 0.5 Mg  Tablet  30.00 10 Pa Daren  [id number]  [id number]  [place] 1[place]   12/30/2019  1   12/30/2019  Lorazepam 0.5 Mg Tablet  30.00 10 Pa Daren  [id number]  [id number]  [place]   11/24/2019  1   06/03/2019  Lorazepam 0.5 Mg Tablet  20.00 7 Pa Daren  [id number]  [id number]  4/5 1.[place]   11/05/2019  1   06/03/2019  Lorazepam 0.5 Mg Tablet  20.00 7 Pa Adren  [id number]  [id number]  3/5 1.[place]   10/27/2019  1   06/03/2019  Lorazepam 0.5 Mg Tablet  20.00 7 Pa Daren  [id number]  [id number]  2/[place] 1.[place]   10/17/2019  1   06/03/2019  Lorazepam 0.5 Mg Tablet  20.00 7 Pa Daren  [id number]  [id number]  1/[place]   09/27/2019  1   06/03/2019  Lorazepam 0.5 Mg Tablet  20.00 7 Pa Daren  [id number]  [id number]  [place] 1.[place]

## 2020-09-10 RX ORDER — LORAZEPAM 0.5 MG/1
TABLET ORAL
Qty: 20 TABLET | Refills: 0 | Status: SHIPPED | OUTPATIENT
Start: 2020-09-10 | End: 2020-09-17

## 2020-09-10 NOTE — TELEPHONE ENCOUNTER
I did fill this but we do need to set up a video visit or phone visit to discuss this Rx and potentially other options for her anxiety

## 2020-09-17 ENCOUNTER — OFFICE VISIT (OUTPATIENT)
Dept: FAMILY MEDICINE | Facility: CLINIC | Age: 75
End: 2020-09-17
Payer: MEDICARE

## 2020-09-17 VITALS
HEART RATE: 134 BPM | OXYGEN SATURATION: 88 % | BODY MASS INDEX: 17.87 KG/M2 | RESPIRATION RATE: 16 BRPM | DIASTOLIC BLOOD PRESSURE: 76 MMHG | HEIGHT: 60 IN | SYSTOLIC BLOOD PRESSURE: 126 MMHG | TEMPERATURE: 98.1 F | WEIGHT: 91 LBS

## 2020-09-17 DIAGNOSIS — J42 CHRONIC BRONCHITIS, UNSPECIFIED CHRONIC BRONCHITIS TYPE (H): ICD-10-CM

## 2020-09-17 DIAGNOSIS — F41.1 GENERALIZED ANXIETY DISORDER: ICD-10-CM

## 2020-09-17 DIAGNOSIS — Z23 NEED FOR PROPHYLACTIC VACCINATION AND INOCULATION AGAINST INFLUENZA: ICD-10-CM

## 2020-09-17 DIAGNOSIS — F32.0 MAJOR DEPRESSIVE DISORDER, SINGLE EPISODE, MILD (H): Primary | ICD-10-CM

## 2020-09-17 PROCEDURE — G0008 ADMIN INFLUENZA VIRUS VAC: HCPCS | Performed by: NURSE PRACTITIONER

## 2020-09-17 PROCEDURE — 90662 IIV NO PRSV INCREASED AG IM: CPT | Performed by: NURSE PRACTITIONER

## 2020-09-17 PROCEDURE — 99214 OFFICE O/P EST MOD 30 MIN: CPT | Mod: 25 | Performed by: NURSE PRACTITIONER

## 2020-09-17 RX ORDER — LORAZEPAM 0.5 MG/1
0.5 TABLET ORAL EVERY 8 HOURS PRN
Qty: 30 TABLET | Refills: 5 | Status: SHIPPED | OUTPATIENT
Start: 2020-09-17 | End: 2021-03-29

## 2020-09-17 ASSESSMENT — MIFFLIN-ST. JEOR: SCORE: 834.27

## 2020-09-17 NOTE — PROGRESS NOTES
Subjective     aTri Wiley is a 75 year old female who presents to clinic today for the following health issues:    HPI       Chief Complaint   Patient presents with     Forms     form for oxygen      Flu Shot     Imm/Inj     Flu Shot       Depression and Anxiety Follow-Up    How are you doing with your depression since your last visit? Worsened     How are you doing with your anxiety since your last visit?  Worsened     Are you having other symptoms that might be associated with depression or anxiety? No    Have you had a significant life event? Grief or Loss     Do you have any concerns with your use of alcohol or other drugs? No     Multiple deaths in the family    COPD Follow-Up    Overall, how are your COPD symptoms since your last clinic visit?  No change    How much fatigue or shortness of breath do you have when you are walking?  Same as usual    How much shortness of breath do you have when you are resting?  Same as usual    How often do you cough? Sometimes    Have you noticed any change in your sputum/phlegm?  No    Have you experienced a recent fever? No    Please describe how far you can walk without stopping to rest:  The length of 1-2 rooms    How many flights of stairs are you able to walk up without stopping?  None    Have you had any Emergency Room Visits, Urgent Care Visits, or Hospital Admissions because of your COPD since your last office visit?  No     Needs oxygen order again  On 2L nasal cannula continuously   Sats on RA 87%  On room air with activity 85%  On oxygen with activity 89%    History   Smoking Status     Former Smoker     Packs/day: 4.00     Years: 36.00     Types: Cigarettes     Quit date: 12/16/1993   Smokeless Tobacco     Never Used     Comment: Started at age 12     No results found for: FEV1, DGF1LDF      Social History     Tobacco Use     Smoking status: Former Smoker     Packs/day: 4.00     Years: 36.00     Pack years: 144.00     Types: Cigarettes     Last attempt to  quit: 1993     Years since quittin.8     Smokeless tobacco: Never Used     Tobacco comment: Started at age 12   Substance Use Topics     Alcohol use: No     Drug use: No     PHQ 2019   PHQ-9 Total Score 16 10 14   Q9: Thoughts of better off dead/self-harm past 2 weeks Several days Not at all Not at all     ROE-7 SCORE 2019   Total Score - - -   Total Score 6 (mild anxiety) - -   Total Score 6 9 18       Review of Systems   Constitutional, HEENT, cardiovascular, pulmonary, gi and gu systems are negative, except as otherwise noted.      Objective    /76   Pulse 134   Temp 98.1  F (36.7  C) (Tympanic)   Resp 16   Ht 1.524 m (5')   Wt 41.3 kg (91 lb)   LMP  (LMP Unknown)   SpO2 (!) 88%   BMI 17.77 kg/m    Body mass index is 17.77 kg/m .  Physical Exam   GENERAL: alert and no distress  NECK: no adenopathy, no asymmetry, masses, or scars and thyroid normal to palpation  RESP: lungs clear to auscultation - no rales, rhonchi or wheezes, no rales  and decreased breath sounds throughout  CV: regular rate and rhythm, normal S1 S2, no S3 or S4, no murmur  PSYCH: mentation appears normal and affect flat          Assessment & Plan     Major depressive disorder, single episode, mild (H)  Worsened with situational events.  No changes today.  - sertraline (ZOLOFT) 50 MG tablet; Take 3 tablets (150 mg) by mouth daily    Generalized anxiety disorder  worsened per above.  Ativan as needed for anxiety.   - LORazepam (ATIVAN) 0.5 MG tablet; Take 1 tablet (0.5 mg) by mouth every 8 hours as needed for anxiety    Chronic bronchitis, unspecified chronic bronchitis type (H)  Stable. Order for oxygen renewed.  - Home Oxygen Order for DME - ONLY FOR DME    Need for prophylactic vaccination and inoculation against influenza    - FLUZONE HIGH DOSE 65+  [47299]  - ADMIN INFLUENZA (For MEDICARE Patients ONLY) []     Home care instructions were reviewed with the patient. The  risks, benefits and treatment options of prescribed medications or other treatments have been discussed with the patient. The patient verbalized their understanding and should call or follow up if no improvement or if they develop further problems.      Return in about 4 weeks (around 10/15/2020), or if symptoms worsen or fail to improve.    MARYJANE Vázquez [facility]

## 2020-10-14 ENCOUNTER — TELEPHONE (OUTPATIENT)
Dept: FAMILY MEDICINE | Facility: CLINIC | Age: 75
End: 2020-10-14

## 2020-10-14 DIAGNOSIS — J44.0 CHRONIC OBSTRUCTIVE PULMONARY DISEASE WITH ACUTE LOWER RESPIRATORY INFECTION (H): ICD-10-CM

## 2020-10-14 RX ORDER — AZITHROMYCIN 250 MG/1
TABLET, FILM COATED ORAL
Qty: 6 TABLET | Refills: 0 | Status: SHIPPED | OUTPATIENT
Start: 2020-10-14 | End: 2020-11-17

## 2020-10-14 RX ORDER — PREDNISONE 20 MG/1
40 TABLET ORAL DAILY
Qty: 10 TABLET | Refills: 0 | Status: SHIPPED | OUTPATIENT
Start: 2020-10-14 | End: 2020-11-17

## 2020-10-14 NOTE — TELEPHONE ENCOUNTER
Reason for Call:  Other     Detailed comments: Patient states she talked to a Patrica yesterday and did not get a call back - not finding a message - Patient's COPD ia acting up and wants a RX - Please call patient    Phone Number Patient can be reached at: Home number on file [phone] (home)    Best Time:     Can we leave a detailed message on this number? YES    Call taken on 10/14/2020 at 10:45 AM by Luciana Santiago

## 2020-11-17 ENCOUNTER — TELEPHONE (OUTPATIENT)
Dept: FAMILY MEDICINE | Facility: CLINIC | Age: 75
End: 2020-11-17

## 2020-11-17 DIAGNOSIS — J44.0 CHRONIC OBSTRUCTIVE PULMONARY DISEASE WITH ACUTE LOWER RESPIRATORY INFECTION (H): ICD-10-CM

## 2020-11-17 RX ORDER — AZITHROMYCIN 250 MG/1
TABLET, FILM COATED ORAL
Qty: 6 TABLET | Refills: 0 | Status: SHIPPED | OUTPATIENT
Start: 2020-11-17 | End: 2020-12-23

## 2020-11-17 RX ORDER — PREDNISONE 20 MG/1
40 TABLET ORAL DAILY
Qty: 10 TABLET | Refills: 0 | Status: SHIPPED | OUTPATIENT
Start: 2020-11-17 | End: 2020-12-23

## 2020-11-17 NOTE — TELEPHONE ENCOUNTER
Reason for call:  Patient reporting a symptom    Symptom or request: Pt says she is coughing green phlegm again and her breathing is OK when she sits in her chair but bad when she gets up to walk around. She wants to know if Dr Peters could prescribe the antibiotic and Prednisone again. She says she is always bad this time of year. Dr Peters did this for her on 10/14.    Have you been treated for this before? Yes    Additional comments: [facility]    Phone Number patient can be reached at:  Home number on file [phone] (home)    Best Time:  anytime    Can we leave a detailed message on this number:  YES    Call taken on 11/17/2020 at 12:05 PM by Patrica King

## 2020-11-22 DIAGNOSIS — J44.9 CHRONIC OBSTRUCTIVE PULMONARY DISEASE, UNSPECIFIED COPD TYPE (H): ICD-10-CM

## 2020-11-25 ENCOUNTER — TELEPHONE (OUTPATIENT)
Dept: FAMILY MEDICINE | Facility: CLINIC | Age: 75
End: 2020-11-25

## 2020-11-25 NOTE — TELEPHONE ENCOUNTER
FYI~ A refill has been made to the  assistance program for Spiriva Handihaler, Advair Diskus, and Ventolin HFA.    A 90 day supply of Spiriva Handihaler, Advair Diskus, and Ventolin HFA will be delivered to [facility] within 7-10 business days.    Thank you,    Cheryl Pierre  Prescription Assistance

## 2020-12-22 ENCOUNTER — TELEPHONE (OUTPATIENT)
Dept: FAMILY MEDICINE | Facility: CLINIC | Age: 75
End: 2020-12-22

## 2020-12-22 DIAGNOSIS — J44.0 CHRONIC OBSTRUCTIVE PULMONARY DISEASE WITH ACUTE LOWER RESPIRATORY INFECTION (H): ICD-10-CM

## 2020-12-22 NOTE — TELEPHONE ENCOUNTER
".Reason for Call: prescription    Detailed comments: Patient didn't give too many details, Patient stated\" just tell them I'm having the same problem and can they prescribe me some medicine\"  [facility]  - [place] [phone]    Phone Number Patient can be reached at: Home number on file [phone] (home)    Best Time:  anytime    Can we leave a detailed message on this number? NO    Call taken on 12/22/2020 at 3:37 PM by Crista Morales      "

## 2020-12-23 RX ORDER — PREDNISONE 20 MG/1
40 TABLET ORAL DAILY
Qty: 10 TABLET | Refills: 0 | Status: SHIPPED | OUTPATIENT
Start: 2020-12-23 | End: 2021-04-07

## 2020-12-23 RX ORDER — AZITHROMYCIN 250 MG/1
TABLET, FILM COATED ORAL
Qty: 6 TABLET | Refills: 0 | Status: SHIPPED | OUTPATIENT
Start: 2020-12-23 | End: 2021-04-07

## 2020-12-23 NOTE — TELEPHONE ENCOUNTER
Patient informed of prescriptions. No questions at this time. Елена Mckeon LPN........12/23/2020 2:41 PM

## 2020-12-23 NOTE — TELEPHONE ENCOUNTER
"S-(situation): I talked with Saige.  She tells me she \"has the same old thing again and wants meds\".  She tells me that she is coughing up greenish phlegm for 2 weeks.  Denies fever, chills, nausea, vomiting or diarrhea.  States wheezes and coughs more with exertion.  Denies shortness of breath.    States if lays in bed, no problems  When I talked with her I did not hear wheezing or coughing and she talked a lot.  Voice is raspy.    B-(background): has COPD.  Using Advair and Spiriva daily.  States does not use the Ventolin much, but past two weeks 1-3 times a day.    A-(assessment): cough with exertion    R-(recommendations): I told her would send message to Dr Peters and would call her back later  Petra Colbert RN      "

## 2021-01-20 DIAGNOSIS — B37.0 THRUSH: ICD-10-CM

## 2021-01-21 RX ORDER — NYSTATIN 100000/ML
SUSPENSION, ORAL (FINAL DOSE FORM) ORAL
Qty: 100 ML | Refills: 3 | Status: SHIPPED | OUTPATIENT
Start: 2021-01-21 | End: 2021-07-28

## 2021-01-21 NOTE — TELEPHONE ENCOUNTER
Requested Prescriptions   Pending Prescriptions Disp Refills     nystatin (MYCOSTATIN) [id number] UNIT/ML suspension [Pharmacy Med Name: NYSTATIN 021940 UNIT/ML Suspension] 100 mL 3     Sig: SWISH AND SPIT 5ML BY MOUTH TWICE DAILY BEFORE MEALS AS NEEDED. IF THRUSH DOES NOT IMPROVE INCREASE TO 4 TIMES DAILY X 7 DAY       There is no refill protocol information for this order        Claudia MCCOY RN, BSN

## 2021-01-25 DIAGNOSIS — F32.0 MAJOR DEPRESSIVE DISORDER, SINGLE EPISODE, MILD (H): ICD-10-CM

## 2021-02-04 ENCOUNTER — TELEPHONE (OUTPATIENT)
Dept: FAMILY MEDICINE | Facility: CLINIC | Age: 76
End: 2021-02-04

## 2021-02-04 NOTE — TELEPHONE ENCOUNTER
FYI~ February 4th, 2021 I spoke with [facility]quez is in need of financial assistance for medication.     We reviewed the Pharmacy Assistance Fund $500, the Prescription Assistance Program for manfacturer brand name assistance programs, gross income, Rx insurance and Rx list.     Not needed at this time for the Pharmacy Assistance Fund.  I will be completing  assistance re-enrollment applications for Advair Diskus, Ventolin HFA, Spiriva Handihaler.    Thank you,    Cheryl Pierre  Prescription Assistance

## 2021-02-10 ENCOUNTER — TELEPHONE (OUTPATIENT)
Dept: FAMILY MEDICINE | Facility: CLINIC | Age: 76
End: 2021-02-10

## 2021-02-10 DIAGNOSIS — J44.1 COPD EXACERBATION (H): Primary | ICD-10-CM

## 2021-02-10 RX ORDER — AZITHROMYCIN 250 MG/1
TABLET, FILM COATED ORAL
Qty: 6 TABLET | Refills: 0 | Status: SHIPPED | OUTPATIENT
Start: 2021-02-10 | End: 2021-04-07

## 2021-02-10 NOTE — TELEPHONE ENCOUNTER
She feels the same as she usually does when she gets this. She would like to try just the antibiotic at this point so she can get her Covid vaccine tomorrow

## 2021-02-10 NOTE — TELEPHONE ENCOUNTER
Reason for call:  Patient reporting a symptom    Symptom or request: Pt says she is having problems with her lungs and breathing. She's very short of breath. She is using her oxygen all the time. No temp. She is coughing up some green stuff and has a dry cough too.   (She has apt to get a Covid shot tomorrow in [place].) She wants to know if Dr Peters can prescribe something for this.     Duration (how long have symptoms been present): About 2 weeks    Have you been treated for this before? Yes    Additional comments: [facility]    Phone Number patient can be reached at:  Home number on file [phone] (home)    Best Time:  anytime    Can we leave a detailed message on this number:  YES    Call taken on 2/10/2021 at 12:54 PM by Patrica King

## 2021-02-12 DIAGNOSIS — K22.2 ESOPHAGEAL STENOSIS: Primary | ICD-10-CM

## 2021-02-12 DIAGNOSIS — F32.0 MAJOR DEPRESSIVE DISORDER, SINGLE EPISODE, MILD (H): ICD-10-CM

## 2021-02-12 RX ORDER — OMEPRAZOLE 40 MG/1
CAPSULE, DELAYED RELEASE ORAL
Qty: 30 CAPSULE | Refills: 0 | Status: SHIPPED | OUTPATIENT
Start: 2021-02-12 | End: 2021-02-18

## 2021-02-16 ENCOUNTER — TELEPHONE (OUTPATIENT)
Dept: FAMILY MEDICINE | Facility: CLINIC | Age: 76
End: 2021-02-16

## 2021-02-17 ENCOUNTER — MEDICAL CORRESPONDENCE (OUTPATIENT)
Dept: HEALTH INFORMATION MANAGEMENT | Facility: CLINIC | Age: 76
End: 2021-02-17

## 2021-02-18 DIAGNOSIS — K22.2 ESOPHAGEAL STENOSIS: ICD-10-CM

## 2021-02-18 RX ORDER — OMEPRAZOLE 40 MG/1
CAPSULE, DELAYED RELEASE ORAL
Qty: 90 CAPSULE | Refills: 1 | Status: SHIPPED | OUTPATIENT
Start: 2021-02-18 | End: 2021-03-11

## 2021-02-18 NOTE — TELEPHONE ENCOUNTER
"Requested Prescriptions   Pending Prescriptions Disp Refills     omeprazole (PRILOSEC) 40 MG DR capsule [Pharmacy Med Name: OMEPRAZOLE 40 MG Capsule Delayed Release] 90 capsule      Sig: TAKE 1 CAPSULE EVERY DAY       PPI Protocol Passed - 2/18/2021 12:03 AM        Passed - Not on Clopidogrel (unless Pantoprazole ordered)        Passed - No diagnosis of osteoporosis on record        Passed - Recent (12 mo) or future (30 days) visit within the authorizing provider's specialty     Patient has had an office visit with the authorizing provider or a provider within the authorizing providers department within the previous 12 mos or has a future within next 30 days. See \"Patient Info\" tab in inbasket, or \"Choose Columns\" in Meds & Orders section of the refill encounter.              Passed - Medication is active on med list        Passed - Patient is age 18 or older        Passed - No active pregnacy on record        Passed - No positive pregnancy test in past 12 months           Prescription approved per [facility] Refill Protocol.    Claudia MCCOY RN, BSN      "

## 2021-03-03 DIAGNOSIS — J44.9 CHRONIC OBSTRUCTIVE PULMONARY DISEASE, UNSPECIFIED COPD TYPE (H): ICD-10-CM

## 2021-03-03 RX ORDER — TIOTROPIUM BROMIDE 18 UG/1
CAPSULE ORAL; RESPIRATORY (INHALATION)
Qty: 90 CAPSULE | Refills: 3 | Status: SHIPPED | OUTPATIENT
Start: 2021-03-03 | End: 2021-07-28

## 2021-03-03 RX ORDER — ALBUTEROL SULFATE 90 UG/1
2 AEROSOL, METERED RESPIRATORY (INHALATION) EVERY 6 HOURS PRN
Qty: 3 INHALER | Refills: 3 | Status: SHIPPED | OUTPATIENT
Start: 2021-03-03 | End: 2022-01-01

## 2021-03-03 NOTE — TELEPHONE ENCOUNTER
I am in process of applying to  assistance program.  These programs require a hand signed, brand name, hard copy script be submitted with their application.    Please hand sign a BRAND name, hard copy scripts for:      SPIRIVA HANDIHALER      ADVAIR DISKUS 500/50      VENTOLIN HFA    Please send the HARD COPY scripts to me     via interoffice mail  FPS Noy Milligan     or via US mail  at:   [facility]  Noy Pickens  [address] [place]  [address]    Thanks so much for your help!    Noy Pickens  Prescription   Pharmacy Assistance  [phone]

## 2021-03-11 DIAGNOSIS — F32.0 MAJOR DEPRESSIVE DISORDER, SINGLE EPISODE, MILD (H): ICD-10-CM

## 2021-03-11 DIAGNOSIS — K22.2 ESOPHAGEAL STENOSIS: ICD-10-CM

## 2021-03-11 RX ORDER — OMEPRAZOLE 40 MG/1
CAPSULE, DELAYED RELEASE ORAL
Qty: 90 CAPSULE | Refills: 1 | Status: SHIPPED | OUTPATIENT
Start: 2021-03-11 | End: 2021-10-04

## 2021-03-24 ENCOUNTER — VIRTUAL VISIT (OUTPATIENT)
Dept: FAMILY MEDICINE | Facility: CLINIC | Age: 76
End: 2021-03-24
Payer: MEDICARE

## 2021-03-24 DIAGNOSIS — J44.1 COPD EXACERBATION (H): Primary | ICD-10-CM

## 2021-03-24 DIAGNOSIS — J42 CHRONIC BRONCHITIS, UNSPECIFIED CHRONIC BRONCHITIS TYPE (H): ICD-10-CM

## 2021-03-24 DIAGNOSIS — F32.0 MAJOR DEPRESSIVE DISORDER, SINGLE EPISODE, MILD (H): ICD-10-CM

## 2021-03-24 DIAGNOSIS — N18.30 STAGE 3 CHRONIC KIDNEY DISEASE, UNSPECIFIED WHETHER STAGE 3A OR 3B CKD (H): ICD-10-CM

## 2021-03-24 DIAGNOSIS — F41.1 GENERALIZED ANXIETY DISORDER: ICD-10-CM

## 2021-03-24 PROCEDURE — 99443 PR PHYSICIAN TELEPHONE EVALUATION 21-30 MIN: CPT | Mod: 95 | Performed by: FAMILY MEDICINE

## 2021-03-24 ASSESSMENT — ANXIETY QUESTIONNAIRES
1. FEELING NERVOUS, ANXIOUS, OR ON EDGE: NEARLY EVERY DAY
2. NOT BEING ABLE TO STOP OR CONTROL WORRYING: NEARLY EVERY DAY
GAD7 TOTAL SCORE: 19
7. FEELING AFRAID AS IF SOMETHING AWFUL MIGHT HAPPEN: NEARLY EVERY DAY
3. WORRYING TOO MUCH ABOUT DIFFERENT THINGS: NEARLY EVERY DAY
6. BECOMING EASILY ANNOYED OR IRRITABLE: NEARLY EVERY DAY
5. BEING SO RESTLESS THAT IT IS HARD TO SIT STILL: SEVERAL DAYS

## 2021-03-24 ASSESSMENT — PATIENT HEALTH QUESTIONNAIRE - PHQ9
SUM OF ALL RESPONSES TO PHQ QUESTIONS 1-9: 16
5. POOR APPETITE OR OVEREATING: NEARLY EVERY DAY

## 2021-03-24 NOTE — PROGRESS NOTES
Saige is a 76 year old who is being evaluated via a billable telephone visit.      What phone number would you like to be contacted at? [phone]  How would you like to obtain your AVS? MyChart    Assessment & Plan     COPD exacerbation (H)    Chronic bronchitis, unspecified chronic bronchitis type (H)  Will try more Oxygen with activity and take prednisone     Stage 3 chronic kidney disease, unspecified whether stage 3a or 3b CKD  Stable no change in treatment plan.     Major depressive disorder, single episode, mild (H)  Not well controlled due to anxiety and worsening COPD    Generalized anxiety disorder  Not well controlled   Will try scheduled ativan and see if this works     She needs serious illness discussion as she is really not doing well now                Depression Screening Follow Up    PHQ 3/24/2021   PHQ-9 Total Score 16   Q9: Thoughts of better off dead/self-harm past 2 weeks Several days                 Follow Up    Follow Up Actions Taken  Patient declined referral.    Discussed the following ways the patient can remain in a safe environment:  be around others      Return in about 2 weeks (around 4/7/2021) for Follow up, using an in person visit, with me.    Suzy Peters MD  [facility]    Subjective   Saige is a 76 year old who presents for the following health issues  accompanied by her grand daughter        HPI     COPD Follow-Up    Overall, how are your COPD symptoms since your last clinic visit?  Much worse    How much fatigue or shortness of breath do you have when you are walking?  A lot more than usual    How much shortness of breath do you have when you are resting?  Same as usual    How often do you cough? Sometimes    Have you noticed any change in your sputum/phlegm?  No    Have you experienced a recent fever? No    Please describe how far you can walk without stopping to rest:  The length of 1-2 rooms    How many flights of stairs are you able  to walk up without stopping?  None    Have you had any Emergency Room Visits, Urgent Care Visits, or Hospital Admissions because of your COPD since your last office visit?  No  Gets anxious when O2 drops, hasn't had any falls    Having anxiety attacks all of the time  History   Smoking Status     Former Smoker     Packs/day: 4.00     Years: 36.00     Types: Cigarettes     Quit date: 1993   Smokeless Tobacco     Never Used     Comment: Started at age 12     No results found for: FEV1, HUB2UXS        Anxiety and depression Follow-Up    How are you doing with your anxiety since your last visit? Worsened has been in her house for 2 years    Are you having other symptoms that might be associated with anxiety? No    Have you had a significant life event? No     Are you feeling depressed? Yes:  currently on medications    Do you have any concerns with your use of alcohol or other drugs? No    Social History     Tobacco Use     Smoking status: Former Smoker     Packs/day: 4.00     Years: 36.00     Pack years: 144.00     Types: Cigarettes     Quit date: 1993     Years since quittin.2     Smokeless tobacco: Never Used     Tobacco comment: Started at age 12   Substance Use Topics     Alcohol use: No     Drug use: No     ROE-7 SCORE 2019 2020 3/24/2021   Total Score - - -   Total Score - - -   Total Score 9 18 19     PHQ 2020 2020 3/24/2021   PHQ-9 Total Score 10 14 16   Q9: Thoughts of better off dead/self-harm past 2 weeks Not at all Not at all Several days             Review of Systems   Constitutional, HEENT, cardiovascular, pulmonary, gi and gu systems are negative, except as otherwise noted.      Objective           Vitals:  No vitals were obtained today due to virtual visit.    Physical Exam   alert, no distress, fatigued and soa and anxious   PSYCH: Alert and oriented times 3; coherent speech, normal   rate and volume, able to articulate logical thoughts, able   to abstract reason, no  tangential thoughts, no hallucinations   or delusions  Her affect is anxious  RESP: No cough, no audible wheezing, able to talk in full sentences  Remainder of exam unable to be completed due to telephone visits                Phone call duration: 25 minutes

## 2021-03-25 ASSESSMENT — ANXIETY QUESTIONNAIRES: GAD7 TOTAL SCORE: 19

## 2021-03-29 DIAGNOSIS — F41.1 GENERALIZED ANXIETY DISORDER: ICD-10-CM

## 2021-03-29 RX ORDER — LORAZEPAM 0.5 MG/1
TABLET ORAL
Qty: 30 TABLET | Refills: 0 | Status: SHIPPED | OUTPATIENT
Start: 2021-03-29 | End: 2021-04-19

## 2021-04-02 ENCOUNTER — TELEPHONE (OUTPATIENT)
Dept: FAMILY MEDICINE | Facility: CLINIC | Age: 76
End: 2021-04-02

## 2021-04-02 DIAGNOSIS — J44.9 CHRONIC OBSTRUCTIVE PULMONARY DISEASE, UNSPECIFIED COPD TYPE (H): ICD-10-CM

## 2021-04-02 RX ORDER — ALBUTEROL SULFATE 90 UG/1
2 AEROSOL, METERED RESPIRATORY (INHALATION) EVERY 6 HOURS PRN
Qty: 18 G | Refills: 11 | Status: CANCELLED | OUTPATIENT
Start: 2021-04-02

## 2021-04-02 RX ORDER — TIOTROPIUM BROMIDE 18 UG/1
CAPSULE ORAL; RESPIRATORY (INHALATION)
Qty: 90 CAPSULE | Refills: 3 | Status: CANCELLED | OUTPATIENT
Start: 2021-04-02

## 2021-04-02 NOTE — TELEPHONE ENCOUNTER
March 11, 2021 I received the Spiriva, Advair & Ventolin HFA scripts I requested for [facility]'s assistance applications.    Unfortunately the scripts were not signed by , so, I sent them back to her for signature.  They have not yet been returned to me.    Please hand sign a BRAND name, hard copy scripts for:      SPIRIVA HANDIHALER      ADVAIR DISKUS      VENTOLIN HFA    Please send the HARD COPY scripts to me     via interoffice mail  FPS Noy Milligan     or via US mail  at:   [facility]  Noy Pickens  [address] [place]  [address]    Thanks so much for your help!    Noy Pickens  Prescription   Pharmacy Assistance  [phone]

## 2021-04-02 NOTE — TELEPHONE ENCOUNTER
Per Dr. Linn Villafuerte   I signed those and put them in interoffice mail on Tuesday or Wednesday of this week   Suzy Walton, RN

## 2021-04-06 ENCOUNTER — TELEPHONE (OUTPATIENT)
Dept: FAMILY MEDICINE | Facility: CLINIC | Age: 76
End: 2021-04-06

## 2021-04-06 NOTE — TELEPHONE ENCOUNTER
Saige has been approved to the [facility] assistance program for Spiriva Handihaler, Advair Diskus, and Ventolin HFA  Through December 31, 2021. She  will receive this medication at no cost through the enrollment period.    A 90 day supply of Spiriva Handihaler, Advair Diskus, and Ventolin HFA will be delivered to [facility] within 7-10 business days.  has been informed of this approval.    She will contact my office for refills as we must work directly with the .  I will note EPIC as each refill is requested.    Thank you,    Cheryl Pierre  Prescription Assistance

## 2021-04-07 ENCOUNTER — OFFICE VISIT (OUTPATIENT)
Dept: FAMILY MEDICINE | Facility: CLINIC | Age: 76
End: 2021-04-07
Payer: MEDICARE

## 2021-04-07 VITALS
OXYGEN SATURATION: 96 % | DIASTOLIC BLOOD PRESSURE: 62 MMHG | SYSTOLIC BLOOD PRESSURE: 104 MMHG | WEIGHT: 93 LBS | HEIGHT: 60 IN | HEART RATE: 125 BPM | RESPIRATION RATE: 22 BRPM | BODY MASS INDEX: 18.26 KG/M2 | TEMPERATURE: 98.4 F

## 2021-04-07 DIAGNOSIS — J44.9 CHRONIC OBSTRUCTIVE PULMONARY DISEASE, UNSPECIFIED COPD TYPE (H): Primary | ICD-10-CM

## 2021-04-07 DIAGNOSIS — F41.1 GENERALIZED ANXIETY DISORDER: ICD-10-CM

## 2021-04-07 LAB
ALBUMIN SERPL-MCNC: 3.4 G/DL (ref 3.4–5)
ALP SERPL-CCNC: 111 U/L (ref 40–150)
ALT SERPL W P-5'-P-CCNC: 18 U/L (ref 0–50)
ANION GAP SERPL CALCULATED.3IONS-SCNC: 2 MMOL/L (ref 3–14)
AST SERPL W P-5'-P-CCNC: 20 U/L (ref 0–45)
BASOPHILS # BLD AUTO: 0.1 10E9/L (ref 0–0.2)
BASOPHILS NFR BLD AUTO: 0.6 %
BILIRUB SERPL-MCNC: 0.2 MG/DL (ref 0.2–1.3)
BUN SERPL-MCNC: 15 MG/DL (ref 7–30)
CALCIUM SERPL-MCNC: 9.3 MG/DL (ref 8.5–10.1)
CHLORIDE SERPL-SCNC: 105 MMOL/L (ref 94–109)
CO2 SERPL-SCNC: 32 MMOL/L (ref 20–32)
CREAT SERPL-MCNC: 0.91 MG/DL (ref 0.52–1.04)
DIFFERENTIAL METHOD BLD: ABNORMAL
EOSINOPHIL # BLD AUTO: 0.4 10E9/L (ref 0–0.7)
EOSINOPHIL NFR BLD AUTO: 3.2 %
ERYTHROCYTE [DISTWIDTH] IN BLOOD BY AUTOMATED COUNT: 13.3 % (ref 10–15)
GFR SERPL CREATININE-BSD FRML MDRD: 62 ML/MIN/{1.73_M2}
GLUCOSE SERPL-MCNC: 89 MG/DL (ref 70–99)
HCT VFR BLD AUTO: 40.4 % (ref 35–47)
HGB BLD-MCNC: 12.3 G/DL (ref 11.7–15.7)
LYMPHOCYTES # BLD AUTO: 1.8 10E9/L (ref 0.8–5.3)
LYMPHOCYTES NFR BLD AUTO: 15.5 %
MCH RBC QN AUTO: 27.3 PG (ref 26.5–33)
MCHC RBC AUTO-ENTMCNC: 30.4 G/DL (ref 31.5–36.5)
MCV RBC AUTO: 90 FL (ref 78–100)
MONOCYTES # BLD AUTO: 0.9 10E9/L (ref 0–1.3)
MONOCYTES NFR BLD AUTO: 7.5 %
NEUTROPHILS # BLD AUTO: 8.3 10E9/L (ref 1.6–8.3)
NEUTROPHILS NFR BLD AUTO: 73.2 %
PLATELET # BLD AUTO: 275 10E9/L (ref 150–450)
POTASSIUM SERPL-SCNC: 4.1 MMOL/L (ref 3.4–5.3)
PROT SERPL-MCNC: 7.8 G/DL (ref 6.8–8.8)
RBC # BLD AUTO: 4.51 10E12/L (ref 3.8–5.2)
SODIUM SERPL-SCNC: 139 MMOL/L (ref 133–144)
WBC # BLD AUTO: 11.4 10E9/L (ref 4–11)

## 2021-04-07 PROCEDURE — 80053 COMPREHEN METABOLIC PANEL: CPT | Performed by: FAMILY MEDICINE

## 2021-04-07 PROCEDURE — 99214 OFFICE O/P EST MOD 30 MIN: CPT | Performed by: FAMILY MEDICINE

## 2021-04-07 PROCEDURE — 36415 COLL VENOUS BLD VENIPUNCTURE: CPT | Performed by: FAMILY MEDICINE

## 2021-04-07 PROCEDURE — 85025 COMPLETE CBC W/AUTO DIFF WBC: CPT | Performed by: FAMILY MEDICINE

## 2021-04-07 ASSESSMENT — MIFFLIN-ST. JEOR: SCORE: 838.35

## 2021-04-07 ASSESSMENT — ANXIETY QUESTIONNAIRES
GAD7 TOTAL SCORE: 14
7. FEELING AFRAID AS IF SOMETHING AWFUL MIGHT HAPPEN: MORE THAN HALF THE DAYS
5. BEING SO RESTLESS THAT IT IS HARD TO SIT STILL: NOT AT ALL
3. WORRYING TOO MUCH ABOUT DIFFERENT THINGS: NEARLY EVERY DAY
2. NOT BEING ABLE TO STOP OR CONTROL WORRYING: NEARLY EVERY DAY
1. FEELING NERVOUS, ANXIOUS, OR ON EDGE: NEARLY EVERY DAY
IF YOU CHECKED OFF ANY PROBLEMS ON THIS QUESTIONNAIRE, HOW DIFFICULT HAVE THESE PROBLEMS MADE IT FOR YOU TO DO YOUR WORK, TAKE CARE OF THINGS AT HOME, OR GET ALONG WITH OTHER PEOPLE: SOMEWHAT DIFFICULT
6. BECOMING EASILY ANNOYED OR IRRITABLE: NEARLY EVERY DAY

## 2021-04-07 ASSESSMENT — PAIN SCALES - GENERAL: PAINLEVEL: NO PAIN (0)

## 2021-04-07 ASSESSMENT — PATIENT HEALTH QUESTIONNAIRE - PHQ9
5. POOR APPETITE OR OVEREATING: NOT AT ALL
SUM OF ALL RESPONSES TO PHQ QUESTIONS 1-9: 18

## 2021-04-07 NOTE — PATIENT INSTRUCTIONS
Increase nebs to 3 times per day     Stay on current inhalers     Increase protein in diet to three ensure daily   Start the egg daily     Use the lorazepam more as needed     Get dressed every day     Telephone visit with me in 3 weeks

## 2021-04-07 NOTE — NURSING NOTE
Chief Complaint   Patient presents with     COPD       Initial LMP  (LMP Unknown)  Estimated body mass index is 17.77 kg/m  as calculated from the following:    Height as of 9/17/20: 1.524 m (5').    Weight as of 9/17/20: 41.3 kg (91 lb).    Patient presents to the clinic using Wheel Chair  Oxygen    Health Maintenance that is potentially due pending provider review:  PHQ9 and GAD7        Is there anyone who you would like to be able to receive your results? Yes  If yes have patient fill out [facility]  Petra Basurto CMA

## 2021-04-07 NOTE — PROGRESS NOTES
Assessment & Plan     Chronic obstructive pulmonary disease, unspecified COPD type (H)  Fair control   Will schedule her albuterol   She will treat anxiety more aggressively   - Comprehensive metabolic panel  - CBC with platelets differential    Generalized anxiety disorder  Not well controlled                Patient Instructions   Increase nebs to 3 times per day     Stay on current inhalers     Increase protein in diet to three ensure daily   Start the egg daily     Use the lorazepam more as needed     Get dressed every day     Telephone visit with me in 3 weeks           Return in about 3 weeks (around 4/28/2021) for using a telephone visit, with me.    Suzy Peters MD  [facility]    Nia Moulton is a 76 year old who presents for the following health issues  accompanied by her grand daughter:    HPI     COPD Follow-Up    Overall, how are your COPD symptoms since your last clinic visit?  Slightly worse. 02- #1- 84-89, #2 91-93, #- 96.    How much fatigue or shortness of breath do you have when you are walking?  More than usual    How much shortness of breath do you have when you are resting?  Same as usual    How often do you cough? THE LAST 3 DAYS- DRY COUGH, no fever    Have you noticed any change in your sputum/phlegm?  No    Have you experienced a recent fever? No    Please describe how far you can walk without stopping to rest:  Less than 10 feet    How many flights of stairs are you able to walk up without stopping?  Walks up 5 steps to the house    Have you had any Emergency Room Visits, Urgent Care Visits, or Hospital Admissions because of your COPD since your last office visit?  No    History   Smoking Status     Former Smoker     Packs/day: 4.00     Years: 36.00     Types: Cigarettes     Quit date: 12/16/1993   Smokeless Tobacco     Never Used     Comment: Started at age 12     No results found for: FEV1, SJG3VIB      How many servings of fruits and  vegetables do you eat daily?  2-3    On average, how many sweetened beverages do you drink each day (Examples: soda, juice, sweet tea, etc.  Do NOT count diet or artificially sweetened beverages)?   Drinks mountain dew most of the day    How many days per week do you exercise enough to make your heart beat faster? 3 or less    How many minutes a day do you exercise enough to make your heart beat faster? 9 or less    How many days per week do you miss taking your medication? 0  States cries a lot and feels isolated.   PHQ 2020 3/24/2021 2021   PHQ-9 Total Score 14 16 18   Q9: Thoughts of better off dead/self-harm past 2 weeks Not at all Several days Not at all       Anxiety Follow-Up    How are you doing with your anxiety since your last visit? Worsened due to breathing or vice versa    Are you having other symptoms that might be associated with anxiety? Yes:  soa     Have you had a significant life event? No     Are you feeling depressed? Yes:  daily     Do you have any concerns with your use of alcohol or other drugs? No    Social History     Tobacco Use     Smoking status: Former Smoker     Packs/day: 4.00     Years: 36.00     Pack years: 144.00     Types: Cigarettes     Quit date: 1993     Years since quittin.3     Smokeless tobacco: Never Used     Tobacco comment: Started at age 12   Substance Use Topics     Alcohol use: No     Drug use: No     ROE-7 SCORE 2020 3/24/2021 2021   Total Score - - -   Total Score - - -   Total Score 18 19 14     PHQ 2020 3/24/2021 2021   PHQ-9 Total Score 14 16 18   Q9: Thoughts of better off dead/self-harm past 2 weeks Not at all Several days Not at all                 Review of Systems   Constitutional, HEENT, cardiovascular, pulmonary, gi and gu systems are negative, except as otherwise noted.      Objective    /62 (BP Location: Right arm, Patient Position: Chair, Cuff Size: Adult Regular)   Pulse 125   Temp 98.4  F (36.9  C) (Tympanic)    Resp 22   Ht 1.524 m (5')   Wt 42.2 kg (93 lb)   LMP  (LMP Unknown)   SpO2 96%   Breastfeeding No   BMI 18.16 kg/m    Body mass index is 18.16 kg/m .  Physical Exam   GENERAL APPEARANCE: healthy, alert, mild distress and Nourishment underweight   RESP: prolonged expiratory phase  CV: regular rates and rhythm, normal S1 S2, no S3 or S4 and no murmur, click or rub  MS: extremities normal- no gross deformities noted  PSYCH: mentation appears normal and affect normal/bright

## 2021-04-08 ASSESSMENT — ANXIETY QUESTIONNAIRES: GAD7 TOTAL SCORE: 14

## 2021-04-18 DIAGNOSIS — F41.1 GENERALIZED ANXIETY DISORDER: ICD-10-CM

## 2021-04-19 RX ORDER — LORAZEPAM 0.5 MG/1
TABLET ORAL
Qty: 30 TABLET | Refills: 0 | Status: SHIPPED | OUTPATIENT
Start: 2021-04-19 | End: 2021-04-28

## 2021-04-26 ENCOUNTER — TELEPHONE (OUTPATIENT)
Dept: FAMILY MEDICINE | Facility: CLINIC | Age: 76
End: 2021-04-26

## 2021-04-26 DIAGNOSIS — J44.1 COPD EXACERBATION (H): ICD-10-CM

## 2021-04-26 RX ORDER — AZITHROMYCIN 250 MG/1
TABLET, FILM COATED ORAL
Qty: 6 TABLET | Refills: 0 | Status: SHIPPED | OUTPATIENT
Start: 2021-04-26 | End: 2021-05-19

## 2021-04-26 RX ORDER — PREDNISONE 10 MG/1
TABLET ORAL
Qty: 65 TABLET | Refills: 0 | Status: SHIPPED | OUTPATIENT
Start: 2021-04-26 | End: 2021-05-19

## 2021-04-26 NOTE — TELEPHONE ENCOUNTER
Patient states she wants to get started on something today and normally Dr Peters will do that for her. She is not able to come over to  here today. Her daughter can pick this up for her as she works in [employer].    Ronit Shin RN

## 2021-04-26 NOTE — TELEPHONE ENCOUNTER
"Received call from patient asking to see Dr Peters today.    Patient states she is concerned \"pneumonia is setting in\".  Patient is scheduled to see Dr Peters on 4/28 but would like to get started on prednisone and antibiotics today.  Patient reports coughing up thick green mucous, occasional wheezing.  Afebrile.  Denies feeling increased SOA, baseline SOA.  Denies chest pain. More fatigued.  Patient states she is drinking fluids, prefers mountain dew over water.  Urine is darker yellow than baseline.  Advised increase water intake if able.  No appointments available at [facility] today.  Will forward to [facility].    Advised patient to have  to ED if SOA worsens, if she continues to wheeze, fever or chest pain. Patient states understanding and agrees.  Edelmira Calvert RN     "

## 2021-04-26 NOTE — TELEPHONE ENCOUNTER
Patient notified. She will keep her virtual appt for 4/28/21 and will go to the ER if she needs to.    Ronit Shin RN

## 2021-04-28 ENCOUNTER — VIRTUAL VISIT (OUTPATIENT)
Dept: FAMILY MEDICINE | Facility: CLINIC | Age: 76
End: 2021-04-28
Payer: MEDICARE

## 2021-04-28 DIAGNOSIS — J44.9 CHRONIC OBSTRUCTIVE PULMONARY DISEASE, UNSPECIFIED COPD TYPE (H): Primary | ICD-10-CM

## 2021-04-28 DIAGNOSIS — F41.1 GENERALIZED ANXIETY DISORDER: ICD-10-CM

## 2021-04-28 PROCEDURE — 99443 PR PHYSICIAN TELEPHONE EVALUATION 21-30 MIN: CPT | Mod: 95 | Performed by: FAMILY MEDICINE

## 2021-04-28 RX ORDER — LORAZEPAM 0.5 MG/1
0.5 TABLET ORAL 2 TIMES DAILY
Qty: 60 TABLET | Refills: 3 | Status: SHIPPED | OUTPATIENT
Start: 2021-04-28 | End: 2021-07-28

## 2021-04-28 NOTE — PROGRESS NOTES
Saige is a 76 year old who is being evaluated via a billable telephone visit.      What phone number would you like to be contacted at? [phone]  How would you like to obtain your AVS? Mail a copy    Assessment & Plan     Chronic obstructive pulmonary disease, unspecified COPD type (H)  Slight improvement     Generalized anxiety disorder  Slight improvement   - LORazepam (ATIVAN) 0.5 MG tablet; Take 1 tablet (0.5 mg) by mouth 2 times daily             Patient Instructions   Nebs 3 times per day     Ativan twice a day     Complete antibiotic and prednisone       Return in about 4 weeks (around 2021) for using a telephone visit, with me.    Suzy Peters MD  [facility]    Subjective   Saige is a 76 year old who presents for the following health issues  accompanied by her granddaugher:    HPI     Anxiety Follow-Up    How are you doing with your anxiety since your last visit? No change    Are you having other symptoms that might be associated with anxiety? No    Have you had a significant life event? No     Are you feeling depressed? Yes:  Patient states that she has been the same since last visit    Do you have any concerns with your use of alcohol or other drugs? No     Actually granddaughter feels like scheduling the ativan has helped     She is less anxious   Breathing is easier     Social History     Tobacco Use     Smoking status: Former Smoker     Packs/day: 4.00     Years: 36.00     Pack years: 144.00     Types: Cigarettes     Quit date: 1993     Years since quittin.3     Smokeless tobacco: Never Used     Tobacco comment: Started at age 12   Substance Use Topics     Alcohol use: No     Drug use: No     ROE-7 SCORE 2020 3/24/2021 2021   Total Score - - -   Total Score - - -   Total Score 18 19 14     PHQ 2020 3/24/2021 2021   PHQ-9 Total Score 14 16 18   Q9: Thoughts of better off dead/self-harm past 2 weeks Not at all Several days Not at  all     Last PHQ-9 4/7/2021   1.  Little interest or pleasure in doing things 3   2.  Feeling down, depressed, or hopeless 3   3.  Trouble falling or staying asleep, or sleeping too much 3   4.  Feeling tired or having little energy 2   5.  Poor appetite or overeating 3   6.  Feeling bad about yourself 3   7.  Trouble concentrating 0   8.  Moving slowly or restless 1   Q9: Thoughts of better off dead/self-harm past 2 weeks 0   PHQ-9 Total Score 18   Difficulty at work, home, or with people Somewhat difficult     ROE-7  4/7/2021   1. Feeling nervous, anxious, or on edge 3   2. Not being able to stop or control worrying 3   3. Worrying too much about different things 3   4. Trouble relaxing 0   5. Being so restless that it is hard to sit still 0   6. Becoming easily annoyed or irritable 3   7. Feeling afraid, as if something awful might happen 2   ROE-7 Total Score 14   If you checked any problems, how difficult have they made it for you to do your work, take care of things at home, or get along with other people? Somewhat difficult       COPD Follow-Up    Overall, how are your COPD symptoms since your last clinic visit?  Better    How much fatigue or shortness of breath do you have when you are walking?  Less than usual    How much shortness of breath do you have when you are resting?  None    How often do you cough? All the time    Have you noticed any change in your sputum/phlegm?  Yes- Green    Have you experienced a recent fever? No    Please describe how far you can walk without stopping to rest:  The length of 1-2 rooms    How many flights of stairs are you able to walk up without stopping?  None    Have you had any Emergency Room Visits, Urgent Care Visits, or Hospital Admissions because of your COPD since your last office visit?  No    History   Smoking Status     Former Smoker     Packs/day: 4.00     Years: 36.00     Types: Cigarettes     Quit date: 12/16/1993   Smokeless Tobacco     Never Used     Comment:  Started at age 12     No results found for: FEV1, [id number]      How many servings of fruits and vegetables do you eat daily?  2-3    On average, how many sweetened beverages do you drink each day (Examples: soda, juice, sweet tea, etc.  Do NOT count diet or artificially sweetened beverages)?   4    How many days per week do you exercise enough to make your heart beat faster? 3 or less    How many minutes a day do you exercise enough to make your heart beat faster? 9 or less    How many days per week do you miss taking your medication? 0    COPD Follow-Up    Overall, how are your COPD symptoms since your last clinic visit?  Better    How much fatigue or shortness of breath do you have when you are walking?  Same as usual    How much shortness of breath do you have when you are resting?  None    How often do you cough? Sometimes    Have you noticed any change in your sputum/phlegm?  Yes- green    Have you experienced a recent fever? No    Please describe how far you can walk without stopping to rest:  The length of 1-2 rooms    How many flights of stairs are you able to walk up without stopping?  None    Have you had any Emergency Room Visits, Urgent Care Visits, or Hospital Admissions because of your COPD since your last office visit?  No    History   Smoking Status     Former Smoker     Packs/day: 4.00     Years: 36.00     Types: Cigarettes     Quit date: 12/16/1993   Smokeless Tobacco     Never Used     Comment: Started at age 12     No results found for: FEV1, [id number]            Review of Systems   Constitutional, HEENT, cardiovascular, pulmonary, gi and gu systems are negative, except as otherwise noted.      Objective           Vitals:  No vitals were obtained today due to virtual visit.    Physical Exam   healthy, alert and no distress  PSYCH: Alert and oriented times 3; coherent speech, normal   rate and volume, able to articulate logical thoughts, able   to abstract reason, no tangential thoughts, no  hallucinations   or delusions  Her affect is normal  RESP: No cough, no audible wheezing, able to talk in full sentences  Remainder of exam unable to be completed due to telephone visits                Phone call duration: 25 minutes

## 2021-05-11 ENCOUNTER — TELEPHONE (OUTPATIENT)
Dept: FAMILY MEDICINE | Facility: CLINIC | Age: 76
End: 2021-05-11

## 2021-05-11 NOTE — TELEPHONE ENCOUNTER
CC: well woman exam, lower extremity edema    History:  Nuvia Greenberg is a 59 y.o. female who presents today for follow-up for evaluation of the above:  Patient presents today for annual well woman exam. She does c/o abnormal vaginal discharge and odor. No PMB or pelvic pain. She reports she could have possibly been exposed to an STI.  She also reports bilateral lower extremity edema. She states this has been present for months but worsening over the past couple months. No redness or pain. No SOB or chest pain. Patient reports complaince with  blood pressure medications without adverse side effects.   Patient has been compliant with medications and tolerating them without symptoms of hyperglycemia or hypoglycemia.     ROS:  Review of Systems   Constitutional: Negative for fatigue and unexpected weight change.   HENT: Negative.    Eyes: Negative.    Respiratory: Negative.    Cardiovascular: Positive for leg swelling.   Gastrointestinal: Negative for abdominal pain, constipation and diarrhea.   Endocrine: Negative.    Genitourinary: Positive for vaginal discharge. Negative for difficulty urinating, dyspareunia, genital sores, menstrual problem, pelvic pain, vaginal bleeding and vaginal pain.        Vaginal odor   Musculoskeletal: Negative.    Skin: Negative.    Neurological: Negative.    Psychiatric/Behavioral: Negative.        Ms. Greenberg  reports that she has been smoking.  She has a 5.00 pack-year smoking history. She has never used smokeless tobacco. She reports that she drinks alcohol. She reports that she does not use drugs.      Current Outpatient Medications:   •  atorvastatin (LIPITOR) 20 MG tablet, Take 1 tablet by mouth Daily., Disp: 90 tablet, Rfl: 3  •  azelastine (ASTELIN) 0.1 % nasal spray, 2 sprays into each nostril Daily. Use in each nostril as directed, Disp: 30 mL, Rfl: 11  •  carvedilol (COREG) 6.25 MG tablet, Take 1 tablet by mouth 2 (Two) Times a Day With Meals., Disp: 60 tablet, Rfl: 5  •  cetirizine  Taylor Juárez, Saige's niece called us and states Saige told her she was prescribed a new medication that's expensive.    I don't see any new meds on her list or any reference to a new med.    Am I missing something?    Thanks,  Noy   "(EQ ALLERGY RELIEF, CETIRIZINE,) 10 MG tablet, Take 1 tablet by mouth Daily., Disp: 90 tablet, Rfl: 3  •  cyclobenzaprine (FLEXERIL) 10 MG tablet, Take 1 tablet by mouth 2 (Two) Times a Day As Needed for Muscle Spasms., Disp: 60 tablet, Rfl: 1  •  glucose blood test strip, Test BID  E 11.9  Per Insurance, Disp: 100 each, Rfl: 12  •  lisinopril-hydrochlorothiazide (PRINZIDE,ZESTORETIC) 10-12.5 MG per tablet, Take 1 tablet by mouth Daily., Disp: 30 tablet, Rfl: 5  •  meloxicam (MOBIC) 7.5 MG tablet, Take 1 tablet by mouth Daily., Disp: 90 tablet, Rfl: 1  •  metFORMIN (GLUCOPHAGE) 500 MG tablet, Take 1 tablet by mouth Daily With Breakfast., Disp: 90 tablet, Rfl: 2  •  omeprazole OTC (PRILOSEC OTC) 20 MG EC tablet, Take 1 tablet by mouth Daily., Disp: 30 tablet, Rfl: 0  •  potassium chloride (K-DUR,KLOR-CON) 20 MEQ CR tablet, TAKE 1 TABLET BY MOUTH TWICE DAILY, Disp: 180 tablet, Rfl: 2  •  furosemide (LASIX) 20 MG tablet, Take 1 tablet by mouth Daily., Disp: 3 tablet, Rfl: 0      OBJECTIVE:  /86 (BP Location: Left arm, Patient Position: Sitting, Cuff Size: Large Adult)   Pulse 75   Temp 98.5 °F (36.9 °C) (Oral)   Resp 16   Ht 165.1 cm (65\")   Wt 115 kg (254 lb)   SpO2 98%   BMI 42.27 kg/m²    Physical Exam   Constitutional: She is oriented to person, place, and time. She appears well-developed and well-nourished.   HENT:   Head: Normocephalic and atraumatic.   Eyes: Conjunctivae, EOM and lids are normal. Pupils are equal, round, and reactive to light.   Neck: Normal range of motion. Neck supple. No thyromegaly present.   Cardiovascular: Normal rate, regular rhythm and normal heart sounds.   Pulmonary/Chest: Effort normal and breath sounds normal.   Abdominal: Soft. Bowel sounds are normal.   Genitourinary: Rectum normal. Rectal exam shows no external hemorrhoid and no tenderness. Pelvic exam was performed with patient supine. No labial fusion. There is no rash, tenderness, lesion or injury on the right " labia. There is no rash, tenderness, lesion or injury on the left labia. Uterus is not deviated, not enlarged, not fixed and not tender. Cervix exhibits no motion tenderness, no discharge and no friability. Right adnexum displays no mass, no tenderness and no fullness. Left adnexum displays no mass, no tenderness and no fullness. No erythema, tenderness or bleeding in the vagina. No foreign body in the vagina. No signs of injury around the vagina. Vaginal discharge found.   Musculoskeletal: Normal range of motion. She exhibits edema (2+ BLL).   Neurological: She is alert and oriented to person, place, and time.   Skin: Skin is warm and dry.   Psychiatric: She has a normal mood and affect.       Assessment/Plan    Nuvia was seen today for gynecologic exam and imaging only.    Diagnoses and all orders for this visit:    Annual visit for general adult medical examination with abnormal findings  Immunizations:      - Tetanus: Unknown or >10 years ago. Recommend to have at pharmacy or on injury.      - Influenza: recommended annually      - Pneumovax:once after age 65      - Prevnar: Once after age 65      - Zostavax: Once after age 60  Colonoscopy: Due at 50  Mammogram: ordered today  PAP: done today  DEXA: DEXA scan at 65    Possible exposure to STD  -     Liquid-based Pap Smear, Screening; Future  Adding STD panel to pap collected today.     Vaginal discharge  -     Liquid-based Pap Smear, Screening; Future  Vaginal odor  -     Liquid-based Pap Smear, Screening; Future  Screening for cervical cancer  -     Liquid-based Pap Smear, Screening; Future  Screening for malignant neoplasm of breast  -     Mammo screening bilateral w CAD; Future    Swelling of lower extremity  -     furosemide (LASIX) 20 MG tablet; Take 1 tablet by mouth Daily.  Will do three days of Lasix.     Essential hypertension  Well controlled, BP goal for age is <140/90 per JNC 8 guidelines and continue current medications    Class 3 severe obesity  due to excess calories with serious comorbidity and body mass index (BMI) of 40.0 to 44.9 in adult (CMS/[facility])  Recommended attention to portion control and being careful about the types and timing of meals for the purpose of weight management.    Type 2 diabetes mellitus without complication, without long-term current use of insulin (CMS/[facility])  A1c is 6.6%. Continue current therapy. She is on ACE-I and statin.      An After Visit Summary was printed and given to the patient at discharge.  Return in about 3 months (around 9/12/2019). Sooner if problems arise.         Regina Walsh APRN. 6/12/2019

## 2021-05-12 NOTE — TELEPHONE ENCOUNTER
Hm. Not sure what she's referring to.  We took care of the Spiriva for her already.    We'll let Taylor know, nothing we haven't already done for Saige    Thank you!    Noy

## 2021-05-19 ENCOUNTER — VIRTUAL VISIT (OUTPATIENT)
Dept: FAMILY MEDICINE | Facility: CLINIC | Age: 76
End: 2021-05-19
Payer: MEDICARE

## 2021-05-19 DIAGNOSIS — F41.1 GENERALIZED ANXIETY DISORDER: ICD-10-CM

## 2021-05-19 DIAGNOSIS — J44.9 CHRONIC OBSTRUCTIVE PULMONARY DISEASE, UNSPECIFIED COPD TYPE (H): Primary | ICD-10-CM

## 2021-05-19 PROCEDURE — 99443 PR PHYSICIAN TELEPHONE EVALUATION 21-30 MIN: CPT | Mod: 95 | Performed by: FAMILY MEDICINE

## 2021-05-19 ASSESSMENT — ANXIETY QUESTIONNAIRES
2. NOT BEING ABLE TO STOP OR CONTROL WORRYING: SEVERAL DAYS
6. BECOMING EASILY ANNOYED OR IRRITABLE: MORE THAN HALF THE DAYS
5. BEING SO RESTLESS THAT IT IS HARD TO SIT STILL: NOT AT ALL
GAD7 TOTAL SCORE: 7
7. FEELING AFRAID AS IF SOMETHING AWFUL MIGHT HAPPEN: NOT AT ALL
1. FEELING NERVOUS, ANXIOUS, OR ON EDGE: MORE THAN HALF THE DAYS
3. WORRYING TOO MUCH ABOUT DIFFERENT THINGS: MORE THAN HALF THE DAYS

## 2021-05-19 ASSESSMENT — PATIENT HEALTH QUESTIONNAIRE - PHQ9
SUM OF ALL RESPONSES TO PHQ QUESTIONS 1-9: 4
5. POOR APPETITE OR OVEREATING: NOT AT ALL

## 2021-05-19 NOTE — PROGRESS NOTES
Saige is a 76 year old who is being evaluated via a billable telephone visit.      What phone number would you like to be contacted at? [phone]  How would you like to obtain your AVS? Mail a copy    Assessment & Plan     Chronic obstructive pulmonary disease, unspecified COPD type (H)  Improved with scheduled nebs  Doing well     Generalized anxiety disorder  Improved with scheduled ativan                No med changes     Return in about 10 weeks (around 7/28/2021) for using a telephone visit, with me.    Suzy Peters MD  [facility]    Subjective   Saige is a 76 year old who presents for the following health issues     HPI     COPD Follow-Up - Ativan has really helped with breathing    Overall, how are your COPD symptoms since your last clinic visit?  Better    How much fatigue or shortness of breath do you have when you are walking?  Same as usual    How much shortness of breath do you have when you are resting?  None    How often do you cough? Sometimes    Have you noticed any change in your sputum/phlegm?  No    Have you experienced a recent fever? No    Please describe how far you can walk without stopping to rest:  The length of 3-5 rooms    How many flights of stairs are you able to walk up without stopping?  None    Have you had any Emergency Room Visits, Urgent Care Visits, or Hospital Admissions because of your COPD since your last office visit?  No    History   Smoking Status     Former Smoker     Packs/day: 4.00     Years: 36.00     Types: Cigarettes     Quit date: 12/16/1993   Smokeless Tobacco     Never Used     Comment: Started at age 12     No results found for: FEV1, [id number]      Anxiety Follow-Up    How are you doing with your anxiety since your last visit? Improved got out of the house last weekend    Are you having other symptoms that might be associated with anxiety? No    Have you had a significant life event? No     Are you feeling depressed? No    Do  you have any concerns with your use of alcohol or other drugs? No    Social History     Tobacco Use     Smoking status: Former Smoker     Packs/day: 4.00     Years: 36.00     Pack years: 144.00     Types: Cigarettes     Quit date: 1993     Years since quittin.4     Smokeless tobacco: Never Used     Tobacco comment: Started at age 12   Substance Use Topics     Alcohol use: No     Drug use: No     ROE-7 SCORE 3/24/2021 2021 2021   Total Score - - -   Total Score - - -   Total Score 19 14 7     PHQ 3/24/2021 2021 2021   PHQ-9 Total Score 16 18 4   Q9: Thoughts of better off dead/self-harm past 2 weeks Several days Not at all Not at all           Review of Systems   Constitutional, HEENT, cardiovascular, pulmonary, gi and gu systems are negative, except as otherwise noted.      Objective           Vitals:  No vitals were obtained today due to virtual visit.    Physical Exam   healthy, alert and no distress  PSYCH: Alert and oriented times 3; coherent speech, normal   rate and volume, able to articulate logical thoughts, able   to abstract reason, no tangential thoughts, no hallucinations   or delusions  Her affect is normal  RESP: No cough, no audible wheezing, able to talk in full sentences  Remainder of exam unable to be completed due to telephone visits                Phone call duration: 23 minutes

## 2021-05-20 ASSESSMENT — ANXIETY QUESTIONNAIRES: GAD7 TOTAL SCORE: 7

## 2021-06-10 DIAGNOSIS — F32.0 MAJOR DEPRESSIVE DISORDER, SINGLE EPISODE, MILD (H): ICD-10-CM

## 2021-06-10 RX ORDER — BUPROPION HYDROCHLORIDE 75 MG/1
TABLET ORAL
Qty: 360 TABLET | Refills: 1 | Status: SHIPPED | OUTPATIENT
Start: 2021-06-10 | End: 2021-08-17

## 2021-07-09 ENCOUNTER — TELEPHONE (OUTPATIENT)
Dept: FAMILY MEDICINE | Facility: CLINIC | Age: 76
End: 2021-07-09

## 2021-07-09 NOTE — TELEPHONE ENCOUNTER
FYI~ A refill has been made to the  assistance program for Spiriva Handihaler & Advair Diskus.    A 90 day supply of Spiriva Handihaler & Advair Diskus  will be delivered to Saige's Home within 7-10 business days.    Thank you,    Cheryl Pierre  Prescription Assistance

## 2021-07-13 ENCOUNTER — HOSPITAL ENCOUNTER (EMERGENCY)
Facility: CLINIC | Age: 76
Discharge: ANOTHER HOSPITAL | End: 2021-07-14
Attending: FAMILY MEDICINE | Admitting: FAMILY MEDICINE
Payer: MEDICARE

## 2021-07-13 ENCOUNTER — APPOINTMENT (OUTPATIENT)
Dept: GENERAL RADIOLOGY | Facility: CLINIC | Age: 76
End: 2021-07-13
Attending: FAMILY MEDICINE
Payer: MEDICARE

## 2021-07-13 ENCOUNTER — APPOINTMENT (OUTPATIENT)
Dept: GENERAL RADIOLOGY | Facility: CLINIC | Age: 76
End: 2021-07-13
Attending: EMERGENCY MEDICINE
Payer: MEDICARE

## 2021-07-13 DIAGNOSIS — S72.142A CLOSED INTERTROCHANTERIC FRACTURE OF HIP, LEFT, INITIAL ENCOUNTER (H): ICD-10-CM

## 2021-07-13 LAB
ALBUMIN SERPL-MCNC: 2.5 G/DL (ref 3.4–5)
ALP SERPL-CCNC: 128 U/L (ref 40–150)
ALT SERPL W P-5'-P-CCNC: 13 U/L (ref 0–50)
ANION GAP SERPL CALCULATED.3IONS-SCNC: 4 MMOL/L (ref 3–14)
AST SERPL W P-5'-P-CCNC: 15 U/L (ref 0–45)
BASOPHILS # BLD AUTO: 0.1 10E3/UL (ref 0–0.2)
BASOPHILS NFR BLD AUTO: 1 %
BILIRUB SERPL-MCNC: 0.3 MG/DL (ref 0.2–1.3)
BUN SERPL-MCNC: 16 MG/DL (ref 7–30)
CALCIUM SERPL-MCNC: 8.3 MG/DL (ref 8.5–10.1)
CHLORIDE BLD-SCNC: 106 MMOL/L (ref 94–109)
CO2 SERPL-SCNC: 32 MMOL/L (ref 20–32)
CREAT SERPL-MCNC: 0.83 MG/DL (ref 0.52–1.04)
EOSINOPHIL # BLD AUTO: 0.2 10E3/UL (ref 0–0.7)
EOSINOPHIL NFR BLD AUTO: 1 %
ERYTHROCYTE [DISTWIDTH] IN BLOOD BY AUTOMATED COUNT: 13.5 % (ref 10–15)
GFR SERPL CREATININE-BSD FRML MDRD: 69 ML/MIN/1.73M2
GLUCOSE BLD-MCNC: 99 MG/DL (ref 70–99)
HCT VFR BLD AUTO: 36.6 % (ref 35–47)
HGB BLD-MCNC: 10.9 G/DL (ref 11.7–15.7)
IMM GRANULOCYTES # BLD: 0.1 10E3/UL
IMM GRANULOCYTES NFR BLD: 1 %
INR PPP: 1 (ref 0.85–1.15)
LYMPHOCYTES # BLD AUTO: 1 10E3/UL (ref 0.8–5.3)
LYMPHOCYTES NFR BLD AUTO: 5 %
MCH RBC QN AUTO: 26.7 PG (ref 26.5–33)
MCHC RBC AUTO-ENTMCNC: 29.8 G/DL (ref 31.5–36.5)
MCV RBC AUTO: 90 FL (ref 78–100)
MONOCYTES # BLD AUTO: 0.9 10E3/UL (ref 0–1.3)
MONOCYTES NFR BLD AUTO: 5 %
NEUTROPHILS # BLD AUTO: 16.1 10E3/UL (ref 1.6–8.3)
NEUTROPHILS NFR BLD AUTO: 87 %
NRBC # BLD AUTO: 0 10E3/UL
NRBC BLD AUTO-RTO: 0 /100
PLATELET # BLD AUTO: 432 10E3/UL (ref 150–450)
POTASSIUM BLD-SCNC: 3.6 MMOL/L (ref 3.4–5.3)
PROT SERPL-MCNC: 6.4 G/DL (ref 6.8–8.8)
RBC # BLD AUTO: 4.08 10E6/UL (ref 3.8–5.2)
SODIUM SERPL-SCNC: 142 MMOL/L (ref 133–144)
WBC # BLD AUTO: 18.4 10E3/UL (ref 4–11)

## 2021-07-13 PROCEDURE — 258N000003 HC RX IP 258 OP 636: Performed by: FAMILY MEDICINE

## 2021-07-13 PROCEDURE — 36415 COLL VENOUS BLD VENIPUNCTURE: CPT | Performed by: FAMILY MEDICINE

## 2021-07-13 PROCEDURE — 73502 X-RAY EXAM HIP UNI 2-3 VIEWS: CPT

## 2021-07-13 PROCEDURE — 85610 PROTHROMBIN TIME: CPT | Performed by: FAMILY MEDICINE

## 2021-07-13 PROCEDURE — 99285 EMERGENCY DEPT VISIT HI MDM: CPT | Mod: 25 | Performed by: FAMILY MEDICINE

## 2021-07-13 PROCEDURE — 71045 X-RAY EXAM CHEST 1 VIEW: CPT

## 2021-07-13 PROCEDURE — 99285 EMERGENCY DEPT VISIT HI MDM: CPT | Performed by: FAMILY MEDICINE

## 2021-07-13 PROCEDURE — 82040 ASSAY OF SERUM ALBUMIN: CPT | Performed by: FAMILY MEDICINE

## 2021-07-13 PROCEDURE — 96376 TX/PRO/DX INJ SAME DRUG ADON: CPT | Performed by: FAMILY MEDICINE

## 2021-07-13 PROCEDURE — 96374 THER/PROPH/DIAG INJ IV PUSH: CPT | Performed by: FAMILY MEDICINE

## 2021-07-13 PROCEDURE — 250N000011 HC RX IP 250 OP 636: Performed by: FAMILY MEDICINE

## 2021-07-13 PROCEDURE — 85025 COMPLETE CBC W/AUTO DIFF WBC: CPT | Performed by: FAMILY MEDICINE

## 2021-07-13 PROCEDURE — 96361 HYDRATE IV INFUSION ADD-ON: CPT | Performed by: FAMILY MEDICINE

## 2021-07-13 PROCEDURE — 36592 COLLECT BLOOD FROM PICC: CPT | Performed by: FAMILY MEDICINE

## 2021-07-13 PROCEDURE — 250N000011 HC RX IP 250 OP 636: Performed by: EMERGENCY MEDICINE

## 2021-07-13 PROCEDURE — C9803 HOPD COVID-19 SPEC COLLECT: HCPCS | Performed by: FAMILY MEDICINE

## 2021-07-13 PROCEDURE — 96375 TX/PRO/DX INJ NEW DRUG ADDON: CPT | Performed by: FAMILY MEDICINE

## 2021-07-13 RX ORDER — ONDANSETRON 2 MG/ML
4 INJECTION INTRAMUSCULAR; INTRAVENOUS EVERY 30 MIN PRN
Status: DISCONTINUED | OUTPATIENT
Start: 2021-07-13 | End: 2021-07-14 | Stop reason: HOSPADM

## 2021-07-13 RX ORDER — HYDROMORPHONE HYDROCHLORIDE 1 MG/ML
0.5 INJECTION, SOLUTION INTRAMUSCULAR; INTRAVENOUS; SUBCUTANEOUS ONCE
Status: COMPLETED | OUTPATIENT
Start: 2021-07-13 | End: 2021-07-13

## 2021-07-13 RX ORDER — HYDROMORPHONE HYDROCHLORIDE 1 MG/ML
0.2 INJECTION, SOLUTION INTRAMUSCULAR; INTRAVENOUS; SUBCUTANEOUS
Status: COMPLETED | OUTPATIENT
Start: 2021-07-13 | End: 2021-07-14

## 2021-07-13 RX ADMIN — SODIUM CHLORIDE, POTASSIUM CHLORIDE, SODIUM LACTATE AND CALCIUM CHLORIDE 500 ML: 600; 310; 30; 20 INJECTION, SOLUTION INTRAVENOUS at 22:36

## 2021-07-13 RX ADMIN — HYDROMORPHONE HYDROCHLORIDE 0.2 MG: 1 INJECTION, SOLUTION INTRAMUSCULAR; INTRAVENOUS; SUBCUTANEOUS at 22:33

## 2021-07-13 RX ADMIN — ONDANSETRON 4 MG: 2 INJECTION INTRAMUSCULAR; INTRAVENOUS at 22:29

## 2021-07-13 RX ADMIN — HYDROMORPHONE HYDROCHLORIDE 0.5 MG: 1 INJECTION, SOLUTION INTRAMUSCULAR; INTRAVENOUS; SUBCUTANEOUS at 22:11

## 2021-07-13 ASSESSMENT — MIFFLIN-ST. JEOR: SCORE: 861.03

## 2021-07-14 ENCOUNTER — TRANSFERRED RECORDS (OUTPATIENT)
Dept: HEALTH INFORMATION MANAGEMENT | Facility: CLINIC | Age: 76
End: 2021-07-14

## 2021-07-14 VITALS
BODY MASS INDEX: 19.24 KG/M2 | SYSTOLIC BLOOD PRESSURE: 134 MMHG | TEMPERATURE: 98 F | OXYGEN SATURATION: 94 % | RESPIRATION RATE: 18 BRPM | WEIGHT: 98 LBS | HEIGHT: 60 IN | DIASTOLIC BLOOD PRESSURE: 68 MMHG | HEART RATE: 100 BPM

## 2021-07-14 LAB — SARS-COV-2 RNA RESP QL NAA+PROBE: NEGATIVE

## 2021-07-14 PROCEDURE — 250N000011 HC RX IP 250 OP 636: Performed by: FAMILY MEDICINE

## 2021-07-14 PROCEDURE — 87635 SARS-COV-2 COVID-19 AMP PRB: CPT | Performed by: FAMILY MEDICINE

## 2021-07-14 RX ADMIN — HYDROMORPHONE HYDROCHLORIDE 0.2 MG: 1 INJECTION, SOLUTION INTRAMUSCULAR; INTRAVENOUS; SUBCUTANEOUS at 03:26

## 2021-07-14 RX ADMIN — HYDROMORPHONE HYDROCHLORIDE 0.2 MG: 1 INJECTION, SOLUTION INTRAMUSCULAR; INTRAVENOUS; SUBCUTANEOUS at 01:30

## 2021-07-14 NOTE — ED TRIAGE NOTES
Pt here with left hip pain after having a mechanical fall at home trying to make it to the bathroom.  Received 50 mcg Fentanyl and 4 mg zofran from EMS.

## 2021-07-14 NOTE — ED NOTES
Pt arrives with stool - states she fell about an hour ago - c/o left hip and pelvis pain - inability to bear weight. Patient cleaned on arrival of stool - tolerated well. Patient denies any loss of consciousness - states lost her footing and fell. Patient is NOT on anticoagulants. Daughter at bedside.

## 2021-07-14 NOTE — ED PROVIDER NOTES
History     Chief Complaint   Patient presents with     Hip Pain     HPI  Tari Wiley is a 76 year old female, past medical history is significant for pneumonia, stage III kidney disease, esophageal candidiasis, esophageal stenosis, gastroenteritis, oral thrush, anemia, generalized weakness, smoker, hyperlipidemia, depression, COPD, generalized anxiety disorder, presents the emergency department with concerns of left hip pain after a slip and fall occurring about an hour prior to presentation.  History is obtained from the patient who presents with her granddaughter by EMS.  She was in her usual state of good health oxygen dependent COPD 2-3 L nasal cannula, she was taking out the garbage when she turned after putting the garbage in a garbage receptacle and fell landing on her left side with immediate pain in her left hip area.  She was unable to get up tried herself across the floor to room and had a phone and called 911.  She states that she hit her head but there was no loss of consciousness and she has no complaints of a headache or neck pain at this time.  Did not traumatize her bilateral upper extremities there was no chest or abdominal pain and there is no back pain.        Allergies:  Allergies   Allergen Reactions     Sulfa Drugs Swelling and Difficulty breathing       Problem List:    Patient Active Problem List    Diagnosis Date Noted     Pneumonia 05/28/2012     Priority: High     Chronic kidney disease, stage 3 03/24/2021     Priority: Medium     Esophageal candidiasis (H) 04/02/2019     Priority: Medium     Esophageal stenosis 04/02/2019     Priority: Medium     Mammogram declined 02/28/2017     Priority: Medium     Health Care Home-Not active 09/08/2014     Priority: Medium     State Tier Level:    Status:  Unable to reach  Care Coordinator:  Talita Peña    See Letters for [facility] Care Plan  Date:  September 8, 2014           Gastroenteritis 05/28/2012     Priority: Medium     Thrush, oral  2011     Priority: Medium     Anemia 2011     Priority: Medium     Generalized weakness 11/10/2011     Priority: Medium     Personal history of smoking 2010     Priority: Medium     Hyperlipidemia LDL goal <130 10/31/2010     Priority: Medium     Mild major depression (H) 06/10/2005     Priority: Medium     2007 doing well on the current dose of Zoloft.        COPD (chronic obstructive pulmonary disease) (HCC) 06/10/2005     Priority: Medium     COPD exacerbation (H) 06/10/2005     Priority: Medium     Generalized anxiety disorder 2011     Priority: Low     Diagnosis updated by automated process. Provider to review and confirm.          Past Medical History:    Past Medical History:   Diagnosis Date     ROE (generalised anxiety disorder)      Hyperlipidemia LDL goal < 130      Tobacco abuse        Past Surgical History:    Past Surgical History:   Procedure Laterality Date     APPENDECTOMY       C/SECTION, LOW TRANSVERSE  1982    , Low Transverse     CATARACT IOL, RT/LT      right-2013     CHOLECYSTECTOMY, OPEN       ESOPHAGOSCOPY, GASTROSCOPY, DUODENOSCOPY (EGD), COMBINED N/A 2019    Procedure: COMBINED ESOPHAGOSCOPY, GASTROSCOPY, DUODENOSCOPY (EGD);  Surgeon: Justin Davila MD;  Location: [facility]     ESOPHAGOSCOPY, GASTROSCOPY, DUODENOSCOPY (EGD), DILATATION, COMBINED N/A 2019    Procedure: Upper Endoscopy With Dilation;  Surgeon: John Perez MD;  Location: [facility]     ESOPHAGOSCOPY, GASTROSCOPY, DUODENOSCOPY (EGD), RESECT MUCOSA, COMBINED N/A 3/1/2019    Procedure: Upper Endoscopy With EMR, and brushings;  Surgeon: John Perez MD;  Location: [facility]     SURGICAL HISTORY OF -       stent to biliary tract       Family History:    Family History   Problem Relation Age of Onset     Alcohol/Drug Father      Cancer Brother        Social History:  Marital Status:   [4]  Social History     Tobacco Use     Smoking status: Former Smoker     Packs/day:  4.00     Years: 36.00     Pack years: 144.00     Types: Cigarettes     Quit date: 1993     Years since quittin.5     Smokeless tobacco: Never Used     Tobacco comment: Started at age 12   Substance Use Topics     Alcohol use: No     Drug use: No        Medications:    acetaminophen (TYLENOL) 500 MG tablet  ADVAIR DISKUS 500-50 MCG/DOSE inhaler  albuterol (PROVENTIL) (2.5 MG/3ML) 0.083% neb solution  buPROPion (WELLBUTRIN) 75 MG tablet  diphenhydrAMINE-acetaminophen (TYLENOL PM)  MG tablet  LORazepam (ATIVAN) 0.5 MG tablet  nystatin (MYCOSTATIN) 888521 UNIT/ML suspension  omeprazole (PRILOSEC) 40 MG DR capsule  order for DME  OXYGEN-HELIUM IN  sertraline (ZOLOFT) 50 MG tablet  SPIRIVA HANDIHALER 18 MCG inhaled capsule  VENTOLIN  (90 Base) MCG/ACT inhaler          Review of Systems   All other systems reviewed and are negative.      Physical Exam   BP: 101/76  Pulse: 100  Temp: 98  F (36.7  C)  Resp: 18  Height: 152.4 cm (5')  Weight: 44.5 kg (98 lb)  SpO2: 95 %      Physical Exam  Vitals and nursing note reviewed.   Constitutional:       Appearance: Normal appearance. She is normal weight.   HENT:      Head: Normocephalic and atraumatic.      Right Ear: Tympanic membrane, ear canal and external ear normal.      Left Ear: Tympanic membrane, ear canal and external ear normal.      Nose: Nose normal.      Mouth/Throat:      Mouth: Mucous membranes are dry.      Pharynx: Oropharynx is clear.   Eyes:      Extraocular Movements: Extraocular movements intact.      Conjunctiva/sclera: Conjunctivae normal.      Pupils: Pupils are equal, round, and reactive to light.   Cardiovascular:      Rate and Rhythm: Normal rate and regular rhythm.      Pulses: Normal pulses.      Heart sounds: Normal heart sounds.   Pulmonary:      Effort: Pulmonary effort is normal.      Breath sounds: Normal breath sounds.   Abdominal:      General: Bowel sounds are normal.      Palpations: Abdomen is soft.   Musculoskeletal:       Cervical back: Normal range of motion and neck supple.        Legs:    Skin:     General: Skin is warm and dry.      Capillary Refill: Capillary refill takes less than 2 seconds.   Neurological:      General: No focal deficit present.      Mental Status: She is alert and oriented to person, place, and time.   Psychiatric:         Mood and Affect: Mood normal.         Behavior: Behavior normal.         ED Course        Procedures              Critical Care time:  none               Results for orders placed or performed during the hospital encounter of 07/13/21 (from the past 24 hour(s))   Pelvis XR w/ unilateral hip left    Narrative    EXAM: XR PELVIS AND HIP LEFT 1 VIEW  LOCATION: [facility]  DATE/TIME: 7/13/2021 8:51 PM    INDICATION: Left-sided hip pain after a fall.  COMPARISON: None.      Impression    IMPRESSION:  1.  Oblique mildly displaced intertrochanteric fracture of the left femur.  2.  No joint malalignment.  3.  Mild bilateral hip degenerative arthrosis.  4.  Lower lumbar facet arthrosis.  5.  Bone demineralization.  6.  Wire material projecting over the right pelvis.   CBC with platelets differential    Narrative    The following orders were created for panel order CBC with platelets differential.  Procedure                               Abnormality         Status                     ---------                               -----------         ------                     CBC with platelets and d...[[id number]]  Abnormal            Final result                 Please view results for these tests on the individual orders.   INR   Result Value Ref Range    INR 1.00 0.85 - 1.15   Comprehensive metabolic panel   Result Value Ref Range    Sodium 142 133 - 144 mmol/L    Potassium 3.6 3.4 - 5.3 mmol/L    Chloride 106 94 - 109 mmol/L    Carbon Dioxide (CO2) 32 20 - 32 mmol/L    Anion Gap 4 3 - 14 mmol/L    Urea Nitrogen 16 7 - 30 mg/dL    Creatinine 0.83 0.52 - 1.04 mg/dL    Calcium 8.3 (L)  8.5 - 10.1 mg/dL    Glucose 99 70 - 99 mg/dL    Alkaline Phosphatase 128 40 - 150 U/L    AST 15 0 - 45 U/L    ALT 13 0 - 50 U/L    Protein Total 6.4 (L) 6.8 - 8.8 g/dL    Albumin 2.5 (L) 3.4 - 5.0 g/dL    Bilirubin Total 0.3 0.2 - 1.3 mg/dL    GFR Estimate 69 >60 mL/min/1.73m2   CBC with platelets and differential   Result Value Ref Range    WBC Count 18.4 (H) 4.0 - 11.0 10e3/uL    RBC Count 4.08 3.80 - 5.20 10e6/uL    Hemoglobin 10.9 (L) 11.7 - 15.7 g/dL    Hematocrit 36.6 35.0 - 47.0 %    MCV 90 78 - 100 fL    MCH 26.7 26.5 - 33.0 pg    MCHC 29.8 (L) 31.5 - 36.5 g/dL    RDW 13.5 10.0 - 15.0 %    Platelet Count 432 150 - 450 10e3/uL    % Neutrophils 87 %    % Lymphocytes 5 %    % Monocytes 5 %    % Eosinophils 1 %    % Basophils 1 %    % Immature Granulocytes 1 %    NRBCs per 100 WBC 0 <1 /100    Absolute Neutrophils 16.1 (H) 1.6 - 8.3 10e3/uL    Absolute Lymphocytes 1.0 0.8 - 5.3 10e3/uL    Absolute Monocytes 0.9 0.0 - 1.3 10e3/uL    Absolute Eosinophils 0.2 0.0 - 0.7 10e3/uL    Absolute Basophils 0.1 0.0 - 0.2 10e3/uL    Absolute Immature Granulocytes 0.1 (H) <=0.0 10e3/uL    Absolute NRBCs 0.0 10e3/uL   XR Chest Port 1 View    Narrative    EXAM: XR CHEST PORT 1 VIEW  LOCATION: [facility]  DATE/TIME: 7/13/2021 11:19 PM    INDICATION: Preop left hip fracture  COMPARISON: 05/13/2019      Impression    IMPRESSION: Emphysematous change. Normal heart size. Small amount of scarring within the right upper lobe. Pleural thickening right base. Postsurgical changes in the right upper abdomen.   Asymptomatic COVID-19 Virus (Coronavirus) by PCR Nasopharyngeal    Specimen: Nasopharyngeal; Swab    Narrative    The following orders were created for panel order Asymptomatic COVID-19 Virus (Coronavirus) by PCR Nasopharyngeal.  Procedure                               Abnormality         Status                     ---------                               -----------         ------                     SARS-COV2  (COVID-19) Vir...[[id number]]                      In process                   Please view results for these tests on the individual orders.       Medications   ondansetron (ZOFRAN) injection 4 mg (4 mg Intravenous Given 7/13/21 2229)   HYDROmorphone (PF) (DILAUDID) injection 0.2 mg (0.2 mg Intravenous Given 7/13/21 2233)   HYDROmorphone (PF) (DILAUDID) injection 0.5 mg (0.5 mg Intravenous Given 7/13/21 2211)   lactated ringers BOLUS 500 mL (500 mLs Intravenous New Bag 7/13/21 2236)   12:19 AM  There are no beds available at our facility, universal bed shortage throughout the [facility] area, I was able to locate a bed for this patient for admission at [facility] and spoke with hospitalist Dr. Gonzalez.  He was willing to accept the care of the patient in transfer but asked that I speak with the orthopedic surgeon on-call first.  1:29 AM  I spoke with Dr. Hampton for orthopedics at [facility] who confirmed that they should be able to get her this afternoon for definitive repair.  Will be transferred as soon as we have bed confirmation.    Assessments & Plan (with Medical Decision Making)   76-year-old female past medical history reviewed as above who presents to the emergency department with concerns of left hip pain after a slip and fall occurring about an hour prior to presentation. Isolated trauma to the left hip, thinks that she probably hit her head but no loss of consciousness and she was actually able to pull herself into the next room and phone for help as discussed in the HPI. On exam she is alert and in no acute distress with focal findings concerning for the possibility of hip fracture. X-ray reveals oblique mildly displaced intertrochanteric fracture of the left femur. No joint malalignment, mild bilateral hip degenerative arthrosis. Lab diagnostics are reviewed as above preop chest x-ray and EKG are also obtained and reviewed. Unfortunately we had no beds available at the  time of this patient's presentation and so a bed had to be found for her an alternate facility, at the present time beds are extremely tight throughout the [place] area and we ultimately located a bed for this patient at [facility] admission to the hospital service with orthopedics consulting and I spoke with both of them as noted at the time stamps above. Transferred in stable condition.    Disclaimer: This note consists of symbols derived from keyboarding, dictation and/or voice recognition software. As a result, there may be errors in the script that have gone undetected. Please consider this when interpreting information found in this chart.      I have reviewed the nursing notes.    I have reviewed the findings, diagnosis, plan and need for follow up with the patient.       New Prescriptions    No medications on file       Final diagnoses:   Closed intertrochanteric fracture of hip, left, initial encounter (H)       7/13/2021   [facility] EMERGENCY DEPT     Álvaro Lim MD  07/18/21 2937

## 2021-07-16 ENCOUNTER — TELEPHONE (OUTPATIENT)
Dept: EMERGENCY MEDICINE | Facility: CLINIC | Age: 76
End: 2021-07-16
Payer: MEDICARE

## 2021-07-16 NOTE — TELEPHONE ENCOUNTER
[facility] Emergency Department Lab result notification     Patient/parent Name  Saige    Reason for call  Patient requesting lab result    Lab Result  Component      Latest Ref Rng & Units 7/14/2021   SARS CoV2 PCR      Negative Negative     Recommendations/Instructions  Notified of result.  Aware letter was sent out on 7/15/21 with more information    Contact your PCP clinic or return to the Emergency department if your:    Symptoms worsen or other concerning symptom's.    PCP follow-up Questions asked: NO    Chris Azevedo RN  [facility]  Emergency Dept Lab Result RN  Ph# [phone]

## 2021-07-20 ENCOUNTER — LAB REQUISITION (OUTPATIENT)
Dept: LAB | Facility: CLINIC | Age: 76
End: 2021-07-20
Payer: MEDICARE

## 2021-07-20 DIAGNOSIS — F41.1 GENERALIZED ANXIETY DISORDER: ICD-10-CM

## 2021-07-21 ENCOUNTER — PATIENT OUTREACH (OUTPATIENT)
Dept: NURSING | Facility: CLINIC | Age: 76
End: 2021-07-21
Payer: MEDICARE

## 2021-07-21 DIAGNOSIS — S72.90XA FEMUR FRACTURE (H): Primary | ICD-10-CM

## 2021-07-21 LAB
ANION GAP SERPL CALCULATED.3IONS-SCNC: 11 MMOL/L (ref 5–18)
BUN SERPL-MCNC: 15 MG/DL (ref 8–28)
CALCIUM SERPL-MCNC: 9 MG/DL (ref 8.5–10.5)
CHLORIDE BLD-SCNC: 95 MMOL/L (ref 98–107)
CO2 SERPL-SCNC: 34 MMOL/L (ref 22–31)
CREAT SERPL-MCNC: 0.79 MG/DL (ref 0.6–1.1)
ERYTHROCYTE [DISTWIDTH] IN BLOOD BY AUTOMATED COUNT: 14.2 % (ref 10–15)
GFR SERPL CREATININE-BSD FRML MDRD: 73 ML/MIN/1.73M2
GLUCOSE BLD-MCNC: 75 MG/DL (ref 70–125)
HCT VFR BLD AUTO: 33.5 % (ref 35–47)
HGB BLD-MCNC: 9.6 G/DL (ref 11.7–15.7)
MCH RBC QN AUTO: 26.4 PG (ref 26.5–33)
MCHC RBC AUTO-ENTMCNC: 28.7 G/DL (ref 31.5–36.5)
MCV RBC AUTO: 92 FL (ref 78–100)
PLATELET # BLD AUTO: 545 10E3/UL (ref 150–450)
POTASSIUM BLD-SCNC: 4.3 MMOL/L (ref 3.5–5)
RBC # BLD AUTO: 3.63 10E6/UL (ref 3.8–5.2)
SODIUM SERPL-SCNC: 140 MMOL/L (ref 136–145)
WBC # BLD AUTO: 10.8 10E3/UL (ref 4–11)

## 2021-07-21 PROCEDURE — 80048 BASIC METABOLIC PNL TOTAL CA: CPT | Mod: ORL | Performed by: FAMILY MEDICINE

## 2021-07-21 PROCEDURE — 85027 COMPLETE CBC AUTOMATED: CPT | Mod: ORL | Performed by: FAMILY MEDICINE

## 2021-07-21 PROCEDURE — P9603 ONE-WAY ALLOW PRORATED MILES: HCPCS | Mod: ORL | Performed by: FAMILY MEDICINE

## 2021-07-21 PROCEDURE — 36415 COLL VENOUS BLD VENIPUNCTURE: CPT | Mod: ORL | Performed by: FAMILY MEDICINE

## 2021-07-21 NOTE — LETTER
[facility] CARE COORDINATION  [facility]  [address] [place]  [address]      July 21, 2021    Tari Wiley  [address] [facility] [address]      Dear [place],    I am a clinic community health worker who works with Suzy Peters MD at [facility]. I wanted to thank you for spending the time to talk with me and I also wanted to provide you with my contact information for any questions or concerns.  Below is a description of clinic care coordination and how we can further assist you.      The clinic care coordination team is made up of a registered nurse,  and community health worker who understand the health care system. The goal of clinic care coordination is to help you manage your health and improve access to the health care system in the most efficient manner. The team can assist you in meeting your health care goals by providing education, coordinating services, strengthening the communication among your providers and supporting you with any resource needs.    Please feel free to contact me with any questions or concerns. We are focused on providing you with the highest-quality healthcare experience possible and that all starts with you.     Sincerely,       Natalie TRAMMELL  Community Health Worker  [facility]  Clinic Care Coordination  [facility] [place] [place]  [email or web address]  [email or web address]   Office: [phone]

## 2021-07-21 NOTE — PROGRESS NOTES
Clinic Care Coordination Contact  Community Health Worker Initial Outreach    Patient accepts CC: No, Admitted to TCU. Patient will be mailed Care Coordination introduction letter for future reference.     Patient stated her hip is doing better. Patient stated she is currently in a TCU, unsure which one and when discharge is anticipated.  Patient confirmed her telephone visit scheduled with PCP 7/28.    The Clinic Community Health Worker spoke with the patient today to discuss possible Clinic Care Coordination enrollment. The service was described to the patient and immediate needs were discussed. The patient declined enrollment at this time and said that she is currently at a TCU. The PCP is encouraged to refer in the future if appropriate.    Natalie TRAMMELL  Community Health Worker  [facility]  Clinic Care Coordination  [facility]  [email or web address]  [email or web address]   Office: [phone]

## 2021-07-28 ENCOUNTER — LAB REQUISITION (OUTPATIENT)
Dept: LAB | Facility: CLINIC | Age: 76
End: 2021-07-28

## 2021-07-28 ENCOUNTER — VIRTUAL VISIT (OUTPATIENT)
Dept: FAMILY MEDICINE | Facility: CLINIC | Age: 76
End: 2021-07-28
Payer: MEDICARE

## 2021-07-28 ENCOUNTER — NURSING HOME VISIT (OUTPATIENT)
Dept: GERIATRICS | Facility: CLINIC | Age: 76
End: 2021-07-28

## 2021-07-28 DIAGNOSIS — J44.9 CHRONIC OBSTRUCTIVE PULMONARY DISEASE, UNSPECIFIED (H): ICD-10-CM

## 2021-07-28 DIAGNOSIS — F32.0 MILD MAJOR DEPRESSION (H): ICD-10-CM

## 2021-07-28 DIAGNOSIS — K22.2 ESOPHAGEAL STENOSIS: ICD-10-CM

## 2021-07-28 DIAGNOSIS — K59.01 SLOW TRANSIT CONSTIPATION: ICD-10-CM

## 2021-07-28 DIAGNOSIS — R05.9 COUGH: ICD-10-CM

## 2021-07-28 DIAGNOSIS — J44.9 CHRONIC OBSTRUCTIVE PULMONARY DISEASE, UNSPECIFIED COPD TYPE (H): Primary | ICD-10-CM

## 2021-07-28 DIAGNOSIS — J44.9 CHRONIC OBSTRUCTIVE PULMONARY DISEASE, UNSPECIFIED COPD TYPE (H): ICD-10-CM

## 2021-07-28 DIAGNOSIS — B37.81 ESOPHAGEAL CANDIDIASIS (H): ICD-10-CM

## 2021-07-28 DIAGNOSIS — F41.1 GENERALIZED ANXIETY DISORDER: ICD-10-CM

## 2021-07-28 DIAGNOSIS — S72.145D CLOSED NONDISPLACED INTERTROCHANTERIC FRACTURE OF LEFT FEMUR WITH ROUTINE HEALING, SUBSEQUENT ENCOUNTER: Primary | ICD-10-CM

## 2021-07-28 DIAGNOSIS — G47.9 SLEEP DISTURBANCES: ICD-10-CM

## 2021-07-28 DIAGNOSIS — S72.009D CLOSED FRACTURE OF HIP WITH ROUTINE HEALING, UNSPECIFIED LATERALITY, SUBSEQUENT ENCOUNTER: ICD-10-CM

## 2021-07-28 PROCEDURE — 99443 PR PHYSICIAN TELEPHONE EVALUATION 21-30 MIN: CPT | Mod: 95 | Performed by: FAMILY MEDICINE

## 2021-07-28 PROCEDURE — 99310 SBSQ NF CARE HIGH MDM 45: CPT | Performed by: NURSE PRACTITIONER

## 2021-07-28 RX ORDER — ASPIRIN 81 MG/1
81 TABLET, CHEWABLE ORAL DAILY
Start: 2021-07-28 | End: 2022-01-01

## 2021-07-28 RX ORDER — LORAZEPAM 0.5 MG/1
0.5 TABLET ORAL 2 TIMES DAILY
Qty: 60 TABLET | Refills: 3 | Status: SHIPPED | OUTPATIENT
Start: 2021-07-28 | End: 2021-08-12

## 2021-07-28 RX ORDER — FLUCONAZOLE 100 MG/1
100 TABLET ORAL DAILY
Qty: 2 TABLET | Refills: 0
Start: 2021-07-28 | End: 2021-07-30

## 2021-07-28 RX ORDER — ACETAMINOPHEN 500 MG
1000 TABLET ORAL 2 TIMES DAILY
Start: 2021-07-28

## 2021-07-28 RX ORDER — SENNA AND DOCUSATE SODIUM 50; 8.6 MG/1; MG/1
1 TABLET, FILM COATED ORAL 2 TIMES DAILY
Start: 2021-07-28 | End: 2022-01-01

## 2021-07-28 RX ORDER — OXYCODONE HYDROCHLORIDE 5 MG/1
TABLET ORAL
Qty: 60 TABLET | Refills: 0 | Status: SHIPPED | OUTPATIENT
Start: 2021-07-28 | End: 2021-08-17

## 2021-07-28 NOTE — PROGRESS NOTES
[place]  PRIMARY CARE PROVIDER AND CLINIC:  Suzy Peters MD, [address] [place] [id number]  Chief Complaint   Patient presents with     Establish Care     transfer from another TCU     [place] Medical Record Number:  [id number]  Place of Service where encounter took place:  [facility] ([facility] [[id number]]    Tari Wiley  is a 76 year old  (1945), admitted to the above facility from [facility]..  Admitted to this facility for  rehab, medical management and nursing care.    HPI:    HPI information obtained from: facility chart records, facility staff, patient report, Care Everywhere Epic chart review and previous TCU orders.     Brief Summary of Hospital Course: Virginia is a 76 year old female with chronic problems of COPD, Anxiety, CKD stage 3, anemia and COVID-19.    Virginia presented to [facility] ED after having a mechanical fall with pain to left hip.  No signs of trauma from hitting her head.  This was her first fall.    X- ray of left his showed non-displaced intertrochanteric fracture and her CXR showed emphysematous with no acute findings.  Virginia transferred to [facility] for orthopedic surgery consultation.  Incidentally, Virginia tested positive with COVID 19 but asymptomatic.         Virginia underwent surgical repair of left hip on 7/14/21.  She did ok for most part post op.  Speech therapy involved due to some trouble swallowing.    GI requested for EGD for long standing dysphagia, but given her COVID status, a esophagogram showed very mild presbyesophagus.  It was recommended to have EGD/dilatation as outpatient as outpatient after 10 day isolation.  Empirical treatment done for possible candidal esophagitis and to look for symptoms improvement and was started on fluconazole.      Completed PT/OT and suggestion was to do a course of TCU and then follow up with orthopedics in 2-3 weeks.  Discharged on  "7/19/21 to [facility].      Updates on Status Since Skilled nursing Admission:   Virginia was moved from [facility] in [place] to [facility] to finish out her therapies.  One family member works in the system.    Due to this NP's schedule in coming days, \"Saige\" was seen tonight on her day of arrival.    Grand-daughter present and between the two of them, they answered questions.    Saige is a frail petite woman on chronic oxygen at 3L/min.  Alert and oriented x3 but does look to her family member to help with questions.    Asked her how many years on oxygen and close or over 20 years.  Lives in [place] and her goal is to return home.    While visiting with Saige another family member called the one present in room and wanted to make sure that her sleeping issues get addressed.  Currently on Melatonin 3mg at HS and does not do much for her.  Already takes Wellbutrin and Zoloft.  Saige willing to work with the Melatonin.      It was also noted that her primary MD called in orders to the nurses.  She ordered Z nevin, Zofran, and CXR and her goal was not to step on toes.  Not sure why involved but ok to follow the orders as she knows Saige well.      CODE STATUS/ADVANCE DIRECTIVES DISCUSSION:   POLST done and DNI with selective treatment picked.    Patient's living condition: lives alone  ALLERGIES: Sulfa drugs  PAST MEDICAL HISTORY:  has a past medical history of ROE (generalised anxiety disorder), Hyperlipidemia LDL goal < 130, and Tobacco abuse.  PAST SURGICAL HISTORY:   has a past surgical history that includes appendectomy; cholecystectomy, open (1967); c/section, low transverse (1982); surgical history of -  (1967); cataract iol, rt/lt (2013); Esophagoscopy, gastroscopy, duodenoscopy (EGD), combined (N/A, 1/28/2019); Esophagoscopy, gastroscopy, duodenoscopy (EGD), resect mucosa, combined (N/A, 3/1/2019); and Esophagoscopy, gastroscopy, duodenoscopy (EGD), dilatation, combined (N/A, " 5/16/2019).  FAMILY HISTORY: family history includes Alcohol/Drug in her father; Cancer in her brother.  SOCIAL HISTORY:   reports that she quit smoking about 27 years ago. Her smoking use included cigarettes. She has a 144.00 pack-year smoking history. She has never used smokeless tobacco. She reports that she does not drink alcohol and does not use drugs.    Post Discharge Medication Reconciliation Status: discharge medications reconciled and changed, per note/orders    Current Outpatient Medications   Medication Sig Dispense Refill     acetaminophen (TYLENOL) 500 MG tablet Take 2 tablets by mouth every 6 hours as needed.       ADVAIR DISKUS 500-50 MCG/DOSE inhaler INHALE 1 PUFF INTO LUNGS EVERY 12 HOURS 3 each 3     albuterol (PROVENTIL) (2.5 MG/3ML) 0.083% neb solution USE ONE VIAL IN NEBULIZER EVERY 4 HOURS AS NEEDED FOR SHORTNESS OF BREATH /DYSPNEA 75 mL 3     buPROPion (WELLBUTRIN) 75 MG tablet Take 2 tablets by mouth twice daily 360 tablet 1     diphenhydrAMINE-acetaminophen (TYLENOL PM)  MG tablet Take 1-2 tablets by mouth nightly as needed.       LORazepam (ATIVAN) 0.5 MG tablet Take 1 tablet (0.5 mg) by mouth 2 times daily 60 tablet 3     nystatin (MYCOSTATIN) 640358 UNIT/ML suspension SWISH AND SPIT 5ML BY MOUTH TWICE DAILY BEFORE MEALS AS NEEDED. IF THRUSH DOES NOT IMPROVE INCREASE TO 4 TIMES DAILY X 7  mL 3     omeprazole (PRILOSEC) 40 MG DR capsule TAKE 1 CAPSULE EVERY DAY 90 capsule 1     order for DME Equipment being ordered: Oxygen  Expected life-long  Oxygen -  Resting on room air - 88%   Resting on 2 L of oxygen - 92%  Activity on room air - 82%  Activity on 2 L of oxygen - 87% 1 Device 11     OXYGEN-HELIUM IN 2-3 liters       sertraline (ZOLOFT) 50 MG tablet Take 3 tablets (150 mg) by mouth daily 270 tablet 1    Oxycodone 5mg 2.5mg po twice a day and then 2.5mg every 4 hours prn not to exceed 5 tabs daily      Melatonin 3mg 6mg by mouth every evening at bedtime.       SPIRIVA  HANDIHALER 18 MCG inhaled capsule INHALE 1 PUFF BY MOUTH ONCE DAILY 90 capsule 3     VENTOLIN  (90 Base) MCG/ACT inhaler Inhale 2 puffs into the lungs every 6 hours as needed for shortness of breath / dyspnea 3 Inhaler 3       ROS:  10 point ROS of systems including Constitutional, Eyes, Respiratory, Cardiovascular, Gastroenterology, Genitourinary, Integumentary, Musculoskeletal, Psychiatric were all negative except for pertinent positives noted in my HPI.    Vitals:  LMP  (LMP Unknown)   Exam:  GENERAL APPEARANCE:  Alert, thin, anxious, cooperative  EYES:  EOM normal, conjunctiva and lids normal, does not wear glasses  RESP:  respiratory effort and palpation of chest normal, no shortness of breath noted when talking, 3L/min via NC, lung fields diminished.    CV:  Palpation and auscultation of heart done , regular rate and rhythm, no murmur, rub, or gallop, no edema  ABDOMEN:  normal bowel sounds, soft, nontender, no hepatosplenomegaly or other masses, no guarding or rebound  :    continent of bladder  M/S:   Gait and station abnormal uses walker for ambulation with supervision.  assist with bed mobility and use of w/c.    SKIN:  incisions are open to air, clean and dry.  various bruising on arm skin is thin and frail.  prone to breakdown due to her petite status  PSYCH:  oriented X 3, normal insight, judgement and memory, affect and mood normal, anxious    Lab/Diagnostic data:  Component      Latest Ref Rng & Units 7/21/2021   Sodium      136 - 145 mmol/L 140   Potassium      3.5 - 5.0 mmol/L 4.3   Chloride      98 - 107 mmol/L 95 (L)   Carbon Dioxide      22 - 31 mmol/L 34 (H)   Anion Gap      5 - 18 mmol/L 11   Urea Nitrogen      8 - 28 mg/dL 15   Creatinine      0.60 - 1.10 mg/dL 0.79   Calcium      8.5 - 10.5 mg/dL 9.0   Glucose      70 - 125 mg/dL 75   GFR Estimate      >60 mL/min/1.73m2 73   WBC      4.0 - 11.0 10e3/uL 10.8   RBC Count      3.80 - 5.20 10e6/uL 3.63 (L)   Hemoglobin      11.7 - 15.7  g/dL 9.6 (L)   Hematocrit      35.0 - 47.0 % 33.5 (L)   MCV      78 - 100 fL 92   MCH      26.5 - 33.0 pg 26.4 (L)   MCHC      31.5 - 36.5 g/dL 28.7 (L)   RDW      10.0 - 15.0 % 14.2   Platelet Count      150 - 450 10e3/uL 545 (H)       ASSESSMENT/PLAN:  Closed nondisplaced intertrochanteric fracture of left femur with routine healing, subsequent encounter  - transferred to [facility] to be closer to home and family.  Reviewed orders and updated her epic medication list.  Goal is to have therapies most likely 6 days a week.    Unclear when follow up appointment will be for her left hip. Staff here will need to set up appointment.  Does take scheduled Tylenol ES and oxycodone with PRN oxycodone available.    Aspirin 81mg po daily until 8/17/21 for prevention of blood clots.     - acetaminophen (TYLENOL) 500 MG tablet; Take 2 tablets (1,000 mg) by mouth 2 times daily  - oxyCODONE (ROXICODONE) 5 MG tablet; Take 0.5 tablets (2.5 mg) by mouth 2 times daily. May also take 0.5 tablets (2.5 mg) every 4 hours as needed (not to exceed 5 tabs in one day). Do all this for 3 days.    Chronic obstructive pulmonary disease, unspecified COPD type (H)  Long time dependence of oxygen.  Currently on Advair 500/50 twice a day, Albuterol neb every 4 hours prn for SOB, Spiriva inhaler daily and albuterol inhaler for rescue.  She knows her medications.  No changes to be made  Now has orders from primary MD for Zofran prn, Zpak 500mg 2 tabs day one and 500mg day 2-4.  Had to call the pharmacy because what was send was wrong directions than what was heard from the primary MD.  Pharmacy explained that someone did not catch that the directions are generic when using dispensing regular Zpak.  Change sticker applied to the card that was sent and nursing to use what is in the computer.    - tiotropium (SPIRIVA RESPIMAT) 2.5 MCG/ACT inhaler; Inhale 1 puff into the lungs daily    Sleep disturbances  Will be increasing the melatonin order for now  frim 3mg to 6mg at HS.  - melatonin 3 MG tablet; Take 2 tablets (6 mg) by mouth At Bedtime    Esophageal candidiasis (H)  Has two more days of Diflucan for esophagitis prevention or presumed.  Tongue does not show any infections.  - fluconazole (DIFLUCAN) 100 MG tablet; Take 1 tablet (100 mg) by mouth daily for 2 days    Generalized anxiety disorder  Saige wanted to make sure her order for Ativan was still in place.  0.5mg twice a day.  No changes.  - LORazepam (ATIVAN) 0.5 MG tablet; Take 1 tablet (0.5 mg) by mouth 2 times daily    Mild major depression (H)  Does take Wellbutrin 75mg twice a day and Zoloft 150mg daily in AM.  Seems pleasant but anxious after todays move which was somewhat abrupt family said.  They will come see her often while here.    Slow transit constipation  Came with senna-S 1 tab twice a day as she is taking narcotics.    - SENNA-docusate sodium (SENNA S) 8.6-50 MG tablet; Take 1 tablet by mouth 2 times daily    Esophageal stenosis  Is on omeprazole 40mg po daily.  No changes as she has swallowing issues and will most likely be seen by speech while here.       Orders written today by NP after visit:  1.  Increase Melatonin to 3mg 2 tabs po at HS for sleep disturbances    Prior to visit, scripts sent as she came with none.    Primary MD from [facility] had called in orders for CXR, Z pack and Zofran prn.      Total time spent with patient visit at the [facility] nursing [facility] was 38 minutes including patient visit, review of past records and discussion with family. Greater than 50% of total time spent with counseling and coordinating care due to multiple orders coming in with admission (TCU and primary MD), clarifying orders, ordering narcotics, discussing her past history, what to expect while in this TCU and anticipation of discharge.     Electronically signed by:  MARYJANE Post CNP

## 2021-07-28 NOTE — LETTER
7/28/2021        RE: Tari Wiley  [address]  [facility] [address]        [facility]  PRIMARY CARE PROVIDER AND CLINIC:  Suzy Peters MD, [address] [place] [address] / [facility] [address]  Chief Complaint   Patient presents with     Establish Care     transfer from another TCU     [place] Medical Record Number:  [id number]  Place of Service where encounter took place:  [facility] [[id number]]    Tari Wiley  is a 76 year old  (1945), admitted to the above facility from [facility]..  Admitted to this facility for  rehab, medical management and nursing care.    HPI:    HPI information obtained from: facility chart records, facility staff, patient report, Care Everywhere Epic chart review and previous TCU orders.     Brief Summary of Hospital Course: Virginia is a 76 year old female with chronic problems of COPD, Anxiety, CKD stage 3, anemia and COVID-19.    Virginia presented to [facility] ED after having a mechanical fall with pain to left hip.  No signs of trauma from hitting her head.  This was her first fall.    X- ray of left his showed non-displaced intertrochanteric fracture and her CXR showed emphysematous with no acute findings.  Virginia transferred to [facility] for orthopedic surgery consultation.  Incidentally, Virginia tested positive with COVID 19 but asymptomatic.         Virginia underwent surgical repair of left hip on 7/14/21.  She did ok for most part post op.  Speech therapy involved due to some trouble swallowing.    GI requested for EGD for long standing dysphagia, but given her COVID status, a esophagogram showed very mild presbyesophagus.  It was recommended to have EGD/dilatation as outpatient as outpatient after 10 day isolation.  Empirical treatment done for possible candidal esophagitis and to look for symptoms improvement and was started on fluconazole.      Completed PT/OT and  "suggestion was to do a course of TCU and then follow up with orthopedics in 2-3 weeks.  Discharged on 7/19/21 to [facility].      Updates on Status Since Skilled nursing Admission:   Virginia was moved from [facility] [place] to [facility] to finish out her therapies.  One family member works in the system.    Due to this NP's schedule in coming days, \"Saige\" was seen tonight on her day of arrival.    Grand-daughter present and between the two of them, they answered questions.    Saige is a frail petite woman on chronic oxygen at 3L/min.  Alert and oriented x3 but does look to her family member to help with questions.    Asked her how many years on oxygen and close or over 20 years.  Lives in [place] and her goal is to return home.    While visiting with Saige another family member called the one present in room and wanted to make sure that her sleeping issues get addressed.  Currently on Melatonin 3mg at HS and does not do much for her.  Already takes Wellbutrin and Zoloft.  Saige willing to work with the Melatonin.      It was also noted that her primary MD called in orders to the nurses.  She ordered Z nevin, Zofran, and CXR and her goal was not to step on toes.  Not sure why involved but ok to follow the orders as she knows Saige well.      CODE STATUS/ADVANCE DIRECTIVES DISCUSSION:   POLST done and DNI with selective treatment picked.    Patient's living condition: lives alone  ALLERGIES: Sulfa drugs  PAST MEDICAL HISTORY:  has a past medical history of ORE (generalised anxiety disorder), Hyperlipidemia LDL goal < 130, and Tobacco abuse.  PAST SURGICAL HISTORY:   has a past surgical history that includes appendectomy; cholecystectomy, open (1967); c/section, low transverse (1982); surgical history of -  (1967); cataract iol, rt/lt (2013); Esophagoscopy, gastroscopy, duodenoscopy (EGD), combined (N/A, 1/28/2019); Esophagoscopy, gastroscopy, duodenoscopy (EGD), resect mucosa, " combined (N/A, 3/1/2019); and Esophagoscopy, gastroscopy, duodenoscopy (EGD), dilatation, combined (N/A, 5/16/2019).  FAMILY HISTORY: family history includes Alcohol/Drug in her father; Cancer in her brother.  SOCIAL HISTORY:   reports that she quit smoking about 27 years ago. Her smoking use included cigarettes. She has a 144.00 pack-year smoking history. She has never used smokeless tobacco. She reports that she does not drink alcohol and does not use drugs.    Post Discharge Medication Reconciliation Status: discharge medications reconciled and changed, per note/orders    Current Outpatient Medications   Medication Sig Dispense Refill     acetaminophen (TYLENOL) 500 MG tablet Take 2 tablets by mouth every 6 hours as needed.       ADVAIR DISKUS 500-50 MCG/DOSE inhaler INHALE 1 PUFF INTO LUNGS EVERY 12 HOURS 3 each 3     albuterol (PROVENTIL) (2.5 MG/3ML) 0.083% neb solution USE ONE VIAL IN NEBULIZER EVERY 4 HOURS AS NEEDED FOR SHORTNESS OF BREATH /DYSPNEA 75 mL 3     buPROPion (WELLBUTRIN) 75 MG tablet Take 2 tablets by mouth twice daily 360 tablet 1     diphenhydrAMINE-acetaminophen (TYLENOL PM)  MG tablet Take 1-2 tablets by mouth nightly as needed.       LORazepam (ATIVAN) 0.5 MG tablet Take 1 tablet (0.5 mg) by mouth 2 times daily 60 tablet 3     nystatin (MYCOSTATIN) 226220 UNIT/ML suspension SWISH AND SPIT 5ML BY MOUTH TWICE DAILY BEFORE MEALS AS NEEDED. IF THRUSH DOES NOT IMPROVE INCREASE TO 4 TIMES DAILY X 7  mL 3     omeprazole (PRILOSEC) 40 MG DR capsule TAKE 1 CAPSULE EVERY DAY 90 capsule 1     order for DME Equipment being ordered: Oxygen  Expected life-long  Oxygen -  Resting on room air - 88%   Resting on 2 L of oxygen - 92%  Activity on room air - 82%  Activity on 2 L of oxygen - 87% 1 Device 11     OXYGEN-HELIUM IN 2-3 liters       sertraline (ZOLOFT) 50 MG tablet Take 3 tablets (150 mg) by mouth daily 270 tablet 1    Oxycodone 5mg 2.5mg po twice a day and then 2.5mg every 4 hours prn  not to exceed 5 tabs daily      Melatonin 3mg 6mg by mouth every evening at bedtime.       SPIRIVA HANDIHALER 18 MCG inhaled capsule INHALE 1 PUFF BY MOUTH ONCE DAILY 90 capsule 3     VENTOLIN  (90 Base) MCG/ACT inhaler Inhale 2 puffs into the lungs every 6 hours as needed for shortness of breath / dyspnea 3 Inhaler 3       ROS:  10 point ROS of systems including Constitutional, Eyes, Respiratory, Cardiovascular, Gastroenterology, Genitourinary, Integumentary, Musculoskeletal, Psychiatric were all negative except for pertinent positives noted in my HPI.    Vitals:  LMP  (LMP Unknown)   Exam:  GENERAL APPEARANCE:  Alert, thin, anxious, cooperative  EYES:  EOM normal, conjunctiva and lids normal, does not wear glasses  RESP:  respiratory effort and palpation of chest normal, no shortness of breath noted when talking, 3L/min via NC, lung fields diminished.    CV:  Palpation and auscultation of heart done , regular rate and rhythm, no murmur, rub, or gallop, no edema  ABDOMEN:  normal bowel sounds, soft, nontender, no hepatosplenomegaly or other masses, no guarding or rebound  :    continent of bladder  M/S:   Gait and station abnormal uses walker for ambulation with supervision.  assist with bed mobility and use of w/c.    SKIN:  incisions are open to air, clean and dry.  various bruising on arm skin is thin and frail.  prone to breakdown due to her petite status  PSYCH:  oriented X 3, normal insight, judgement and memory, affect and mood normal, anxious    Lab/Diagnostic data:  Component      Latest Ref Rng & Units 7/21/2021   Sodium      136 - 145 mmol/L 140   Potassium      3.5 - 5.0 mmol/L 4.3   Chloride      98 - 107 mmol/L 95 (L)   Carbon Dioxide      22 - 31 mmol/L 34 (H)   Anion Gap      5 - 18 mmol/L 11   Urea Nitrogen      8 - 28 mg/dL 15   Creatinine      0.60 - 1.10 mg/dL 0.79   Calcium      8.5 - 10.5 mg/dL 9.0   Glucose      70 - 125 mg/dL 75   GFR Estimate      >60 mL/min/1.73m2 73   WBC       4.0 - 11.0 10e3/uL 10.8   RBC Count      3.80 - 5.20 10e6/uL 3.63 (L)   Hemoglobin      11.7 - 15.7 g/dL 9.6 (L)   Hematocrit      35.0 - 47.0 % 33.5 (L)   MCV      78 - 100 fL 92   MCH      26.5 - 33.0 pg 26.4 (L)   MCHC      31.5 - 36.5 g/dL 28.7 (L)   RDW      10.0 - 15.0 % 14.2   Platelet Count      150 - 450 10e3/uL 545 (H)       ASSESSMENT/PLAN:  Closed nondisplaced intertrochanteric fracture of left femur with routine healing, subsequent encounter  [facility]  Reviewed orders and updated her epic medication list.  Goal is to have therapies most likely 6 days a week.    Unclear when follow up appointment will be for her left hip. Staff here will need to set up appointment.  Does take scheduled Tylenol ES and oxycodone with PRN oxycodone available.    Aspirin 81mg po daily until 8/17/21 for prevention of blood clots.     - acetaminophen (TYLENOL) 500 MG tablet; Take 2 tablets (1,000 mg) by mouth 2 times daily  - oxyCODONE (ROXICODONE) 5 MG tablet; Take 0.5 tablets (2.5 mg) by mouth 2 times daily. May also take 0.5 tablets (2.5 mg) every 4 hours as needed (not to exceed 5 tabs in one day). Do all this for 3 days.    Chronic obstructive pulmonary disease, unspecified COPD type (H)  Long time dependence of oxygen.  Currently on Advair 500/50 twice a day, Albuterol neb every 4 hours prn for SOB, Spiriva inhaler daily and albuterol inhaler for rescue.  She knows her medications.  No changes to be made  Now has orders from primary MD for Zofran prn, Zpak 500mg 2 tabs day one and 500mg day 2-4.  Had to call the pharmacy because what was send was wrong directions than what was heard from the primary MD.  Pharmacy explained that someone did not catch that the directions are generic when using dispensing regular Zpak.  Change sticker applied to the card that was sent and nursing to use what is in the computer.    - tiotropium (SPIRIVA RESPIMAT) 2.5 MCG/ACT inhaler;  Inhale 1 puff into the lungs daily    Sleep disturbances  Will be increasing the melatonin order for now frim 3mg to 6mg at HS.  - melatonin 3 MG tablet; Take 2 tablets (6 mg) by mouth At Bedtime    Esophageal candidiasis (H)  Has two more days of Diflucan for esophagitis prevention or presumed.  Tongue does not show any infections.  - fluconazole (DIFLUCAN) 100 MG tablet; Take 1 tablet (100 mg) by mouth daily for 2 days    Generalized anxiety disorder  Saige wanted to make sure her order for Ativan was still in place.  0.5mg twice a day.  No changes.  - LORazepam (ATIVAN) 0.5 MG tablet; Take 1 tablet (0.5 mg) by mouth 2 times daily    Mild major depression (H)  Does take Wellbutrin 75mg twice a day and Zoloft 150mg daily in AM.  Seems pleasant but anxious after todays move which was somewhat abrupt family said.  They will come see her often while here.    Slow transit constipation  Came with senna-S 1 tab twice a day as she is taking narcotics.    - SENNA-docusate sodium (SENNA S) 8.6-50 MG tablet; Take 1 tablet by mouth 2 times daily    Esophageal stenosis  Is on omeprazole 40mg po daily.  No changes as she has swallowing issues and will most likely be seen by speech while here.       Orders written today by NP after visit:  1.  Increase Melatonin to 3mg 2 tabs po at HS for sleep disturbances    Prior to visit, scripts sent as she came with none.    Primary MD from [facility] had called in orders for CXR, Z pack and Zofran prn.      Total time spent with patient visit at the [facility] nursing [facility] was 38 minutes including patient visit, review of past records and discussion with family. Greater than 50% of total time spent with counseling and coordinating care due to multiple orders coming in with admission (TCU and primary MD), clarifying orders, ordering narcotics, discussing her past history, what to expect while in this TCU and anticipation of discharge.     Electronically signed by:  Elidia Duran,  APRN CNP                           Sincerely,        MARYJANE Post CNP

## 2021-07-28 NOTE — PROGRESS NOTES
/Saige is a 76 year old who is being evaluated via a billable telephone visit.      What phone number would you like to be contacted at? [phone]  How would you like to obtain your AVS? Mail a copy?     Assessment & Plan     Chronic obstructive pulmonary disease, unspecified COPD type (H)  On oxygen 2-3 liters and stable     Cough  New and productive   She had pos covid 14 days ago and sxs started now with green phlegm   Will get CXR   Start zpak     Closed fracture of hip with routine healing, unspecified laterality, subsequent encounter  S/p surgical repair   In TCU now   PT and OT       Review of external notes as documented elsewhere in note  35 minutes spent on the date of the encounter doing chart review, review of outside records, review of test results, patient visit, documentation and discussion with family        See plan orders given to TCU RN     No follow-ups on file.    Suzy Peters MD  [facility]    Subjective   Saige is a 76 year old who presents for the following health issues  accompanied by her daughter and grand daughter:    [facility]       Hospital Follow-up Visit:  Moved to [facility] Facility: [facility]  Date of Admission: 07/14/2021  Date of Discharge: 0719/2021   Reason(s) for Admission: Femur fracture       Was your hospitalization related to COVID-19? YES   How are you feeling today? Much better  In the past 24 hours have you had shortness of breath when speaking, walking, or climbing stairs? I don't have breathing problems  Do you have a cough? Usual cough due COPD   When is the last time you had a fever greater than 100? Has not had a fever   Are you having any other symptoms? None   Do you have any other stressors you would like to discuss with your provider? No         Was the patient in the ICU or did the patient experience delirium during hospitalization?  No    Problems taking medications  regularly:  None  Medication changes since discharge: see med sheet   Problems adhering to non-medication therapy:  None    Summary of hospitalization:  CareEverywhere information obtained and reviewed  Diagnostic Tests/Treatments reviewed.  Follow up needed: cxr  Other Healthcare Providers Involved in Patient s Care:         geriatrics at tcu   Update since discharge: improved. Post Discharge Medication Reconciliation: discharge medications reconciled and changed, per note/orders.  Plan of care communicated with patient, family and caregiver          Pt tested pos for covid but never had any sxs    She has severe COPD oxygen dependent and is fully vaccinated  Oxygen sat is 97% on oxygen today   She has a productive cough   No fever   She is not SOA other than her baseline    Doing well in PT and walking with walker       Weight loss   Adm weigh at TCU is 71 lbs 20 lb weight loss   She is not eating well at all   GI history reviewed needs dilation   She is on supplements 4 x per day   Will check in with weight in 3 days       Review of Systems   Constitutional, HEENT, cardiovascular, pulmonary, gi and gu systems are negative, except as otherwise noted.      Objective           Vitals:  No vitals were obtained today due to virtual visit.    Physical Exam   healthy, alert and no distress  PSYCH: Alert and oriented times 3; coherent speech, normal   rate and volume, able to articulate logical thoughts, able   to abstract reason, no tangential thoughts, no hallucinations   or delusions  Her affect is normal  RESP: No cough, no audible wheezing, able to talk in full sentences  Remainder of exam unable to be completed due to telephone visits                Phone call duration: 25 minutes

## 2021-07-29 ENCOUNTER — LAB REQUISITION (OUTPATIENT)
Dept: LAB | Facility: CLINIC | Age: 76
End: 2021-07-29
Payer: MEDICARE

## 2021-07-29 ENCOUNTER — TELEPHONE (OUTPATIENT)
Dept: GERIATRICS | Facility: CLINIC | Age: 76
End: 2021-07-29

## 2021-07-29 DIAGNOSIS — D63.8 ANEMIA IN OTHER CHRONIC DISEASES CLASSIFIED ELSEWHERE: ICD-10-CM

## 2021-07-29 LAB
ERYTHROCYTE [DISTWIDTH] IN BLOOD BY AUTOMATED COUNT: 14 % (ref 10–15)
HCT VFR BLD AUTO: 34.6 % (ref 35–47)
HGB BLD-MCNC: 10.2 G/DL (ref 11.7–15.7)
MCH RBC QN AUTO: 26.8 PG (ref 26.5–33)
MCHC RBC AUTO-ENTMCNC: 29.5 G/DL (ref 31.5–36.5)
MCV RBC AUTO: 91 FL (ref 78–100)
PLATELET # BLD AUTO: 658 10E3/UL (ref 150–450)
RBC # BLD AUTO: 3.8 10E6/UL (ref 3.8–5.2)
WBC # BLD AUTO: 8.5 10E3/UL (ref 4–11)

## 2021-07-29 PROCEDURE — 86481 TB AG RESPONSE T-CELL SUSP: CPT

## 2021-07-29 PROCEDURE — 85027 COMPLETE CBC AUTOMATED: CPT

## 2021-07-29 PROCEDURE — P9604 ONE-WAY ALLOW PRORATED TRIP: HCPCS

## 2021-07-29 PROCEDURE — 36415 COLL VENOUS BLD VENIPUNCTURE: CPT

## 2021-07-29 NOTE — TELEPHONE ENCOUNTER
"[facility] Nurse Triage Telephone Note    Provider: MARYJANE Riggins CNP  Facility: [facility]  Facility Type:  TCU    Caller: Melonie  Call Back Number: [phone]    Allergies:    Allergies   Allergen Reactions     Sulfa Drugs Swelling and Difficulty breathing        Reason for call: CXR results from yesterday not called in yet: \"Mild patchy right basilar density compatible with pneumonia or atelectasis...chronic obstructive pulmonary disease\" Pt Afeb, on O2 3l sat 99% Is coughing thick green/yellow mucous. Has scheduled MDI's & PRN Nebs. Pt not eating/drinking well, has lost 20# in past 2 weeks. WBC 8.5, Hgb 10.2, Plt 658.  See pt also had virtual visit with her PMD yesterday who ordered all these tests.      Verbal Order/Direction given by Provider: Notify PMD who ordered these things & update her. (Dr. Suzy Peters [phone])    Provider giving Order:  MARYJANE Bhagat CNP    Verbal Order given to: Melonie Hampton RN      "

## 2021-08-01 LAB
GAMMA INTERFERON BACKGROUND BLD IA-ACNC: 0.01 IU/ML
M TB IFN-G BLD-IMP: NEGATIVE
M TB IFN-G CD4+ BCKGRND COR BLD-ACNC: 3.03 IU/ML
MITOGEN IGNF BCKGRD COR BLD-ACNC: 0.01 IU/ML
MITOGEN IGNF BCKGRD COR BLD-ACNC: 0.04 IU/ML
QUANTIFERON MITOGEN: 3.04 IU/ML
QUANTIFERON NIL TUBE: 0.01 IU/ML
QUANTIFERON TB1 TUBE: 0.05 IU/ML
QUANTIFERON TB2 TUBE: 0.02

## 2021-08-01 RX ORDER — LANOLIN ALCOHOL/MO/W.PET/CERES
6 CREAM (GRAM) TOPICAL AT BEDTIME
Start: 2021-07-28 | End: 2021-08-07

## 2021-08-03 ENCOUNTER — TRANSITIONAL CARE UNIT VISIT (OUTPATIENT)
Dept: GERIATRICS | Facility: CLINIC | Age: 76
End: 2021-08-03
Payer: MEDICARE

## 2021-08-03 VITALS
RESPIRATION RATE: 22 BRPM | SYSTOLIC BLOOD PRESSURE: 117 MMHG | HEART RATE: 107 BPM | BODY MASS INDEX: 14.14 KG/M2 | OXYGEN SATURATION: 95 % | HEIGHT: 60 IN | TEMPERATURE: 97 F | WEIGHT: 72 LBS | DIASTOLIC BLOOD PRESSURE: 66 MMHG

## 2021-08-03 DIAGNOSIS — F41.1 GENERALIZED ANXIETY DISORDER: ICD-10-CM

## 2021-08-03 DIAGNOSIS — S72.145D CLOSED NONDISPLACED INTERTROCHANTERIC FRACTURE OF LEFT FEMUR WITH ROUTINE HEALING, SUBSEQUENT ENCOUNTER: ICD-10-CM

## 2021-08-03 DIAGNOSIS — G47.9 SLEEP DISTURBANCES: ICD-10-CM

## 2021-08-03 DIAGNOSIS — J44.9 CHRONIC OBSTRUCTIVE PULMONARY DISEASE, UNSPECIFIED COPD TYPE (H): Primary | ICD-10-CM

## 2021-08-03 PROCEDURE — 99309 SBSQ NF CARE MODERATE MDM 30: CPT | Performed by: NURSE PRACTITIONER

## 2021-08-03 ASSESSMENT — MIFFLIN-ST. JEOR: SCORE: 743.09

## 2021-08-03 NOTE — LETTER
8/3/2021        RE: Tari Wiley  [address] [address]        [facility] GERIATRIC SERVICES    Chief Complaint   Patient presents with     RECHECK     HPI:  Tari Wiley is a 76 year old  (1945), who is being seen today for an episodic care visit at: [facility] ([facility]) [[id number]]. Today's concern is:     Diagnoses       Codes Comments    Chronic obstructive pulmonary disease, unspecified COPD type (H)    -  Primary J44.9     Closed nondisplaced intertrochanteric fracture of left femur with routine healing, subsequent encounter     S72.145D     Generalized anxiety disorder     F41.1         Came to follow up with Saige today as this NP saw her last week before being gone for a couple of days.  Came into her room and one of her daughter's was there as well as OT.  Saige was standing up and instructed to take deep breaths and then could sit back down.  Instantly she became anxious and asked for her oxygen to be increased.  OT said she was going to get the oximeter and test her oxygen first before jumping to increase the oxygen.    Came back and Saige was at 91%.  Reminded to take deep breaths and slowly her breathing calmed down.  Not what Saige is use to doing but she did it and daughter did not say anything to appease her mother.    Saige admits to having pain in her legs.  She is on scheduled 2.5mg of oxycodone twice a day and then a PRN available as well.  Figure there will be pain with her healing fracture.      Allergies, and PMH/PSH reviewed in EPIC today.    Current Outpatient Medications   Medication Sig Dispense Refill     melatonin 3 MG tablet Take 3 tablets (9 mg) by mouth At Bedtime       acetaminophen (TYLENOL) 500 MG tablet Take 2 tablets (1,000 mg) by mouth 2 times daily       ADVAIR DISKUS 500-50 MCG/DOSE inhaler INHALE 1 PUFF INTO LUNGS EVERY 12 HOURS 3 each 3     albuterol (PROVENTIL) (2.5 MG/3ML) 0.083% neb solution USE ONE VIAL  IN NEBULIZER EVERY 4 HOURS AS NEEDED FOR SHORTNESS OF BREATH /DYSPNEA 75 mL 3     aspirin (ASA) 81 MG chewable tablet Take 1 tablet (81 mg) by mouth daily       buPROPion (WELLBUTRIN) 75 MG tablet Take 2 tablets by mouth twice daily 360 tablet 1     LORazepam (ATIVAN) 0.5 MG tablet Take 1 tablet (0.5 mg) by mouth 2 times daily 60 tablet 3     omeprazole (PRILOSEC) 40 MG DR capsule TAKE 1 CAPSULE EVERY DAY 90 capsule 1     order for DME Equipment being ordered: Oxygen  Expected life-long  Oxygen -  Resting on room air - 88%   Resting on 2 L of oxygen - 92%  Activity on room air - 82%  Activity on 2 L of oxygen - 87% 1 Device 11     SENNA-docusate sodium (SENNA S) 8.6-50 MG tablet Take 1 tablet by mouth 2 times daily       sertraline (ZOLOFT) 50 MG tablet Take 3 tablets (150 mg) by mouth daily 270 tablet 1     tiotropium (SPIRIVA RESPIMAT) 2.5 MCG/ACT inhaler Inhale 1 puff into the lungs daily       VENTOLIN  (90 Base) MCG/ACT inhaler Inhale 2 puffs into the lungs every 6 hours as needed for shortness of breath / dyspnea 3 Inhaler 3       REVIEW OF SYSTEMS:  4 point ROS including Respiratory, CV, GI and , other than that noted in the HPI,  is negative    Objective:   /66   Pulse 107   Temp 97  F (36.1  C)   Resp 22   Ht 1.524 m (5')   Wt 32.7 kg (72 lb)   LMP  (LMP Unknown)   SpO2 95%   BMI 14.06 kg/m    Was standing up with OT and then assisted back down to sit.    Can see she is trying to take good breaths despite not being deep.  Eventually her breathing slowed down and she could talk normally.  Heart rate regular and strong.  Lung sounds diminished.  No audible sounds.  Oxygen at 3L/min.  Complimented her on being less anxious than she was the other day.  Smiling and interacting with her grand-daughter from a distant.      Component      Latest Ref Rng & Units 7/29/2021   WBC      4.0 - 11.0 10e3/uL 8.5   RBC Count      3.80 - 5.20 10e6/uL 3.80   Hemoglobin      11.7 - 15.7 g/dL 10.2 (L)    Hematocrit      35.0 - 47.0 % 34.6 (L)   MCV      78 - 100 fL 91   MCH      26.5 - 33.0 pg 26.8   MCHC      31.5 - 36.5 g/dL 29.5 (L)   RDW      10.0 - 15.0 % 14.0   Platelet Count      150 - 450 10e3/uL 658 (H)     Component      Latest Ref Rng & Units 7/21/2021   WBC      4.0 - 11.0 10e3/uL 10.8   RBC Count      3.80 - 5.20 10e6/uL 3.63 (L)   Hemoglobin      11.7 - 15.7 g/dL 9.6 (L)   Hematocrit      35.0 - 47.0 % 33.5 (L)   MCV      78 - 100 fL 92   MCH      26.5 - 33.0 pg 26.4 (L)   MCHC      31.5 - 36.5 g/dL 28.7 (L)   RDW      10.0 - 15.0 % 14.2   Platelet Count      150 - 450 10e3/uL 545 (H)     Assessment/Plan:  Chronic obstructive pulmonary disease, unspecified COPD type (H)  - able to do exercise and keep her oxygen saturation up.  Staying at 3L/min via NC  Continues with Spiriva and Advair.    Feel it was good for her to see that she was able to takes some breaths to regain her breathing and know she still was above 90%.    No changes.     Closed nondisplaced intertrochanteric fracture of left femur with routine healing, subsequent encounter  Has some discomfort in her legs.  Is on scheduled Tylenol 1000mg twice a day and then oxycodone 2.5mg twice a day and 2.5mg every 4 hours prn max 5 tabs in a day.    No changes.  Staples are out and incision open to air.  Monitored for signs or symptoms of infection.    Generalized anxiety disorder  Has scheduled Lorazepam 0.25mg twice a day for anxiety.  Between her breathing and truly her nerves, the Lorazepam helps take the edge off.  Seems to be settling in to the environment and had no concerns.    Sleep disturbances  Updating her Epic chart.  She is on 3 tabs = 9mg at HS.  She seems to be doing ok with the melatonin for now.  - melatonin 3 MG tablet; Take 3 tablets (9 mg) by mouth At Bedtime    Orders:  No new orders      Electronically signed by: ElidiaMARYJANE Vigil CNP           Sincerely,        MARYJANE Post CNP

## 2021-08-03 NOTE — PROGRESS NOTES
[facility] GERIATRIC SERVICES    Chief Complaint   Patient presents with     RECHECK     HPI:  Tari Wiley is a 76 year old  (1945), who is being seen today for an episodic care visit at: [facility]) [[id number]]. Today's concern is:     Diagnoses       Codes Comments    Chronic obstructive pulmonary disease, unspecified COPD type (H)    -  Primary J44.9     Closed nondisplaced intertrochanteric fracture of left femur with routine healing, subsequent encounter     S72.145D     Generalized anxiety disorder     F41.1         Came to follow up with Ginger today as this NP saw her last week before being gone for a couple of days.  Came into her room and one of her daughter's was there as well as OT.  Ginger was standing up and instructed to take deep breaths and then could sit back down.  Instantly she became anxious and asked for her oxygen to be increased.  OT said she was going to get the oximeter and test her oxygen first before jumping to increase the oxygen.    Came back and Ginger was at 91%.  Reminded to take deep breaths and slowly her breathing calmed down.  Not what Ginger is use to doing but she did it and daughter did not say anything to appease her mother.    Ginger admits to having pain in her legs.  She is on scheduled 2.5mg of oxycodone twice a day and then a PRN available as well.  Figure there will be pain with her healing fracture.      Allergies, and PMH/PSH reviewed in EPIC today.    Current Outpatient Medications   Medication Sig Dispense Refill     melatonin 3 MG tablet Take 3 tablets (9 mg) by mouth At Bedtime       acetaminophen (TYLENOL) 500 MG tablet Take 2 tablets (1,000 mg) by mouth 2 times daily       ADVAIR DISKUS 500-50 MCG/DOSE inhaler INHALE 1 PUFF INTO LUNGS EVERY 12 HOURS 3 each 3     albuterol (PROVENTIL) (2.5 MG/3ML) 0.083% neb solution USE ONE VIAL IN NEBULIZER EVERY 4 HOURS AS NEEDED FOR SHORTNESS OF BREATH /DYSPNEA 75 mL 3     aspirin (ASA) 81  MG chewable tablet Take 1 tablet (81 mg) by mouth daily       buPROPion (WELLBUTRIN) 75 MG tablet Take 2 tablets by mouth twice daily 360 tablet 1     LORazepam (ATIVAN) 0.5 MG tablet Take 1 tablet (0.5 mg) by mouth 2 times daily 60 tablet 3     omeprazole (PRILOSEC) 40 MG DR capsule TAKE 1 CAPSULE EVERY DAY 90 capsule 1     order for DME Equipment being ordered: Oxygen  Expected life-long  Oxygen -  Resting on room air - 88%   Resting on 2 L of oxygen - 92%  Activity on room air - 82%  Activity on 2 L of oxygen - 87% 1 Device 11     SENNA-docusate sodium (SENNA S) 8.6-50 MG tablet Take 1 tablet by mouth 2 times daily       sertraline (ZOLOFT) 50 MG tablet Take 3 tablets (150 mg) by mouth daily 270 tablet 1     tiotropium (SPIRIVA RESPIMAT) 2.5 MCG/ACT inhaler Inhale 1 puff into the lungs daily       VENTOLIN  (90 Base) MCG/ACT inhaler Inhale 2 puffs into the lungs every 6 hours as needed for shortness of breath / dyspnea 3 Inhaler 3       REVIEW OF SYSTEMS:  4 point ROS including Respiratory, CV, GI and , other than that noted in the HPI,  is negative    Objective:   /66   Pulse 107   Temp 97  F (36.1  C)   Resp 22   Ht 1.524 m (5')   Wt 32.7 kg (72 lb)   LMP  (LMP Unknown)   SpO2 95%   BMI 14.06 kg/m    Was standing up with OT and then assisted back down to sit.    Can see she is trying to take good breaths despite not being deep.  Eventually her breathing slowed down and she could talk normally.  Heart rate regular and strong.  Lung sounds diminished.  No audible sounds.  Oxygen at 3L/min.  Complimented her on being less anxious than she was the other day.  Smiling and interacting with her grand-daughter from a distant.      Component      Latest Ref Rng & Units 7/29/2021   WBC      4.0 - 11.0 10e3/uL 8.5   RBC Count      3.80 - 5.20 10e6/uL 3.80   Hemoglobin      11.7 - 15.7 g/dL 10.2 (L)   Hematocrit      35.0 - 47.0 % 34.6 (L)   MCV      78 - 100 fL 91   MCH      26.5 - 33.0 pg 26.8    MCHC      31.5 - 36.5 g/dL 29.5 (L)   RDW      10.0 - 15.0 % 14.0   Platelet Count      150 - 450 10e3/uL 658 (H)     Component      Latest Ref Rng & Units 7/21/2021   WBC      4.0 - 11.0 10e3/uL 10.8   RBC Count      3.80 - 5.20 10e6/uL 3.63 (L)   Hemoglobin      11.7 - 15.7 g/dL 9.6 (L)   Hematocrit      35.0 - 47.0 % 33.5 (L)   MCV      78 - 100 fL 92   MCH      26.5 - 33.0 pg 26.4 (L)   MCHC      31.5 - 36.5 g/dL 28.7 (L)   RDW      10.0 - 15.0 % 14.2   Platelet Count      150 - 450 10e3/uL 545 (H)     Assessment/Plan:  Chronic obstructive pulmonary disease, unspecified COPD type (H)  - able to do exercise and keep her oxygen saturation up.  Staying at 3L/min via NC  Continues with Spiriva and Advair.    Feel it was good for her to see that she was able to takes some breaths to regain her breathing and know she still was above 90%.    No changes.     Closed nondisplaced intertrochanteric fracture of left femur with routine healing, subsequent encounter  Has some discomfort in her legs.  Is on scheduled Tylenol 1000mg twice a day and then oxycodone 2.5mg twice a day and 2.5mg every 4 hours prn max 5 tabs in a day.    No changes.  Staples are out and incision open to air.  Monitored for signs or symptoms of infection.    Generalized anxiety disorder  Has scheduled Lorazepam 0.25mg twice a day for anxiety.  Between her breathing and truly her nerves, the Lorazepam helps take the edge off.  Seems to be settling in to the environment and had no concerns.    Sleep disturbances  Updating her Epic chart.  She is on 3 tabs = 9mg at HS.  She seems to be doing ok with the melatonin for now.  - melatonin 3 MG tablet; Take 3 tablets (9 mg) by mouth At Bedtime    Orders:  No new orders      Electronically signed by: MARYJANE Post CNP

## 2021-08-05 ENCOUNTER — TELEPHONE (OUTPATIENT)
Dept: FAMILY MEDICINE | Facility: CLINIC | Age: 76
End: 2021-08-05

## 2021-08-05 DIAGNOSIS — J44.9 CHRONIC OBSTRUCTIVE PULMONARY DISEASE, UNSPECIFIED COPD TYPE (H): ICD-10-CM

## 2021-08-05 DIAGNOSIS — R53.1 GENERALIZED WEAKNESS: ICD-10-CM

## 2021-08-05 DIAGNOSIS — S72.009D CLOSED FRACTURE OF HIP WITH ROUTINE HEALING, UNSPECIFIED LATERALITY, SUBSEQUENT ENCOUNTER: Primary | ICD-10-CM

## 2021-08-05 NOTE — TELEPHONE ENCOUNTER
Reason for Call:  Other prescription    Detailed comments: Daughter Randy is requesting Hospital Bed & Standard Wheel Chair. Pt fell broke her Left Hip. Pt is currently at the [facility] in [place].  Randy is going to find out where to send this signed order  Please Advise.    Phone Number Patient can be reached at: Home number on file [phone] (home)    Best Time: Any Time      Can we leave a detailed message on this number? YES    Call taken on 8/5/2021 at 9:57 AM by Samantha Cardenas

## 2021-08-07 RX ORDER — LANOLIN ALCOHOL/MO/W.PET/CERES
9 CREAM (GRAM) TOPICAL AT BEDTIME
Start: 2021-07-28 | End: 2022-01-01

## 2021-08-09 NOTE — PROGRESS NOTES
[place] GERIATRIC SERVICES  PRIMARY CARE PROVIDER AND CLINIC:  Suzy Peters MD, 1945 [address] [place] [id number]  Chief Complaint   Patient presents with     Hospital F/U     [facility] Record Number:  [id number]  Place of Service where encounter took place:  [facility] [place] [facility] ([facility]) [[id number]]    Tari Wiley  is a 76 year old  (1945), [facility]..  Admitted to this facility for  rehab, medical management and nursing care.    HPI:    HPI information obtained from: facility staff, patient report, [facility] chart review and family/first contact daugther and grand daughter report.   Brief Summary of Hospital Course:   - pt with pmh notable for copd had a fall resulted in non displaced left intertrochanteric fx s/p ORIF.  - hospital stay notable for:  - positive covid19 but asymptomatic  - given hx of dysphagia:  Esophagogram revealed mild presbyesophagus. Will need EDG.dilation on outpatient patients, started on empirical Rx for query candidal esophagitis.      Updates on Status Since Skilled nursing Admission:   -7/28: Increase Melatonin to 3mg 2 tabs po at HS for sleep disturbances         Today:  - Left femur fx: patient denies pain,   - rehab:reports it is going fine   - copd: reports always on O2 all the time, 2 liter at home, here 2-2.5 L at rest, and 3 liter with activities, drops  Down to lower 80's with therapy, and the recovery times is 1-2 minutes. Reports at baseline.       CODE STATUS/ADVANCE DIRECTIVES DISCUSSION:   DNR only  Patient's living condition: lives alone  ALLERGIES: Sulfa drugs  PAST MEDICAL HISTORY:  has a past medical history of ROE (generalised anxiety disorder), Hyperlipidemia LDL goal < 130, and Tobacco abuse.  PAST SURGICAL HISTORY:   has a past surgical history that includes appendectomy; cholecystectomy, open (1967); c/section, low transverse (1982); surgical history of -  (1967);  cataract iol, rt/lt (2013); Esophagoscopy, gastroscopy, duodenoscopy (EGD), combined (N/A, 1/28/2019); Esophagoscopy, gastroscopy, duodenoscopy (EGD), resect mucosa, combined (N/A, 3/1/2019); and Esophagoscopy, gastroscopy, duodenoscopy (EGD), dilatation, combined (N/A, 5/16/2019).  FAMILY HISTORY: family history includes Alcohol/Drug in her father; Cancer in her brother.  SOCIAL HISTORY:   reports that she quit smoking about 27 years ago. Her smoking use included cigarettes. She has a 144.00 pack-year smoking history. She has never used smokeless tobacco. She reports that she does not drink alcohol and does not use drugs.    Post Discharge Medication Reconciliation Status: discharge medications reconciled and changed, per note/orders  Current Outpatient Medications   Medication Sig Dispense Refill     acetaminophen (TYLENOL) 500 MG tablet Take 2 tablets (1,000 mg) by mouth 2 times daily       ADVAIR DISKUS 500-50 MCG/DOSE inhaler INHALE 1 PUFF INTO LUNGS EVERY 12 HOURS 3 each 3     albuterol (PROVENTIL) (2.5 MG/3ML) 0.083% neb solution USE ONE VIAL IN NEBULIZER EVERY 4 HOURS AS NEEDED FOR SHORTNESS OF BREATH /DYSPNEA 75 mL 3     aspirin (ASA) 81 MG chewable tablet Take 1 tablet (81 mg) by mouth daily       buPROPion (WELLBUTRIN) 75 MG tablet Take 2 tablets by mouth twice daily 360 tablet 1     LORazepam (ATIVAN) 0.5 MG tablet Take 1 tablet (0.5 mg) by mouth 2 times daily 60 tablet 3     melatonin 3 MG tablet Take 3 tablets (9 mg) by mouth At Bedtime       omeprazole (PRILOSEC) 40 MG DR capsule TAKE 1 CAPSULE EVERY DAY 90 capsule 1     order for DME Equipment being ordered: Oxygen  Expected life-long  Oxygen -  Resting on room air - 88%   Resting on 2 L of oxygen - 92%  Activity on room air - 82%  Activity on 2 L of oxygen - 87% 1 Device 11     SENNA-docusate sodium (SENNA S) 8.6-50 MG tablet Take 1 tablet by mouth 2 times daily       sertraline (ZOLOFT) 50 MG tablet Take 3 tablets (150 mg) by mouth daily 270 tablet  1     tiotropium (SPIRIVA RESPIMAT) 2.5 MCG/ACT inhaler Inhale 1 puff into the lungs daily       VENTOLIN  (90 Base) MCG/ACT inhaler Inhale 2 puffs into the lungs every 6 hours as needed for shortness of breath / dyspnea 3 Inhaler 3     ROS:  10 point ROS of systems including Constitutional, Eyes, Respiratory, Cardiovascular, Gastroenterology, Genitourinary, Integumentary, Musculoskeletal, Psychiatric were all negative except for pertinent positives noted in my HPI.    Vitals:  /69   Pulse 106   Temp 98.3  F (36.8  C)   Resp 19   Ht 1.524 m (5')   Wt 33.2 kg (73 lb 3.2 oz)   LMP  (LMP Unknown)   SpO2 95%   BMI 14.30 kg/m    Exam:  GENERAL APPEARANCE:  in no distress, cooperative, thin  ENT:  NC in place, Mouth moist  EYES:  EOMI, Pupil rounded and equal.  RESP: -diffuse coarse sounds over posterior surface.   CV:  S1S2 audible, regular HR, no murmur appreciated.   ABDOMEN:  soft, NT/ND, BS audible. no mass appreciated on palpation.   M/S:   no joint deformity noted on observation.   SKIN:  Surgical sites over left hip and mid thigh dry, no erythema,   NEURO:   No NFD appreciated on observation. Hand  5/5 b/l  PSYCH:  normal insight, judgement and memory, affect and mood normal    Lab/Diagnostic data: Reviewed in the chart and EHR.        ASSESSMENT/PLAN:  ----------------------------  Recent mechanical fall resulted in non displaced left intertrochanter fx s/p ORIF  Acute musculoskeletal pain  - - Started rehab program, making a progress, continue until desired goal is achieved.  Likely will be discharged on 8/12 back home with family member. Will continue PT/OT at home. Orders in place  - Analgesia optimal with oxycodone 2.5 mg bid scheduled and 2.5 mg Q 1HR prn (do not exceed 5 tab daily).   - Followed by Orthopedic Team.     COPD, emphysema  (H):  Chronic hypoxic and hypercapnic respiratory failure (H):  Recent covid19 positive infection  - at baseline.     Chronic dysphagia, recent  esophagogram revealed very mild presbyesophagus  Query esophagitis candidiasis  - completed diflucan 10 days course  - will need EGD and dilation on outpatient basis    Anxiety: on ativan, chronically.     Discharge order:  - Follow up with PCP in 1-2 weeks  - follow with orthopedic routinely  - PT/OT/ home health and RN      Electronically signed by:  John Root MD

## 2021-08-10 ENCOUNTER — TRANSITIONAL CARE UNIT VISIT (OUTPATIENT)
Dept: GERIATRICS | Facility: CLINIC | Age: 76
End: 2021-08-10
Payer: MEDICARE

## 2021-08-10 VITALS
TEMPERATURE: 98.3 F | HEART RATE: 106 BPM | WEIGHT: 73.2 LBS | HEIGHT: 60 IN | BODY MASS INDEX: 14.37 KG/M2 | RESPIRATION RATE: 19 BRPM | DIASTOLIC BLOOD PRESSURE: 69 MMHG | OXYGEN SATURATION: 95 % | SYSTOLIC BLOOD PRESSURE: 139 MMHG

## 2021-08-10 DIAGNOSIS — B37.81 ESOPHAGEAL CANDIDIASIS (H): ICD-10-CM

## 2021-08-10 DIAGNOSIS — J44.9 CHRONIC OBSTRUCTIVE PULMONARY DISEASE, UNSPECIFIED COPD TYPE (H): ICD-10-CM

## 2021-08-10 DIAGNOSIS — J96.11 CHRONIC RESPIRATORY FAILURE WITH HYPOXIA AND HYPERCAPNIA (H): ICD-10-CM

## 2021-08-10 DIAGNOSIS — S72.145D CLOSED NONDISPLACED INTERTROCHANTERIC FRACTURE OF LEFT FEMUR WITH ROUTINE HEALING, SUBSEQUENT ENCOUNTER: Primary | ICD-10-CM

## 2021-08-10 DIAGNOSIS — K22.89 PRESBYESOPHAGUS: ICD-10-CM

## 2021-08-10 DIAGNOSIS — J96.12 CHRONIC RESPIRATORY FAILURE WITH HYPOXIA AND HYPERCAPNIA (H): ICD-10-CM

## 2021-08-10 PROCEDURE — 99305 1ST NF CARE MODERATE MDM 35: CPT | Mod: AI | Performed by: FAMILY MEDICINE

## 2021-08-10 ASSESSMENT — MIFFLIN-ST. JEOR: SCORE: 748.53

## 2021-08-10 NOTE — LETTER
8/10/2021        RE: Tari Wiley  [address]  [facility] [address]        [facility]  PRIMARY CARE PROVIDER AND CLINIC:  Suzy Peters MD, [address] [place] [id number]  Chief Complaint   Patient presents with     Hospital F/U     [facility] Medical Record Number:  [id number]  Place of Service where encounter took place:  [facility] ([facility]) [[id number]]    Tari Wiley  is a 76 year old  (1945), [facility] the above facility [facility]..  Admitted to this facility for  rehab, medical management and nursing care.    HPI:    HPI information obtained from: facility staff, patient report, [facility] chart review and family/first contact daugther and grand daughter report.   Brief Summary of Hospital Course:   - pt with pmh notable for copd had a fall resulted in non displaced left intertrochanteric fx s/p ORIF.  - hospital stay notable for:  - positive covid19 but asymptomatic  - given hx of dysphagia:  Esophagogram revealed mild presbyesophagus. Will need EDG.dilation on outpatient patients, started on empirical Rx for query candidal esophagitis.      Updates on Status Since Skilled nursing Admission:   -7/28: Increase Melatonin to 3mg 2 tabs po at HS for sleep disturbances         Today:  - Left femur fx: patient denies pain,   - rehab:reports it is going fine   - copd: reports always on O2 all the time, 2 liter at home, here 2-2.5 L at rest, and 3 liter with activities, drops  Down to lower 80's with therapy, and the recovery times is 1-2 minutes. Reports at baseline.       CODE STATUS/ADVANCE DIRECTIVES DISCUSSION:   DNR only  Patient's living condition: lives alone  ALLERGIES: Sulfa drugs  PAST MEDICAL HISTORY:  has a past medical history of ROE (generalised anxiety disorder), Hyperlipidemia LDL goal < 130, and Tobacco abuse.  PAST SURGICAL HISTORY:   has a past surgical history that includes  appendectomy; cholecystectomy, open (1967); c/section, low transverse (1982); surgical history of -  (1967); cataract iol, rt/lt (2013); Esophagoscopy, gastroscopy, duodenoscopy (EGD), combined (N/A, 1/28/2019); Esophagoscopy, gastroscopy, duodenoscopy (EGD), resect mucosa, combined (N/A, 3/1/2019); and Esophagoscopy, gastroscopy, duodenoscopy (EGD), dilatation, combined (N/A, 5/16/2019).  FAMILY HISTORY: family history includes Alcohol/Drug in her father; Cancer in her brother.  SOCIAL HISTORY:   reports that she quit smoking about 27 years ago. Her smoking use included cigarettes. She has a 144.00 pack-year smoking history. She has never used smokeless tobacco. She reports that she does not drink alcohol and does not use drugs.    Post Discharge Medication Reconciliation Status: discharge medications reconciled and changed, per note/orders  Current Outpatient Medications   Medication Sig Dispense Refill     acetaminophen (TYLENOL) 500 MG tablet Take 2 tablets (1,000 mg) by mouth 2 times daily       ADVAIR DISKUS 500-50 MCG/DOSE inhaler INHALE 1 PUFF INTO LUNGS EVERY 12 HOURS 3 each 3     albuterol (PROVENTIL) (2.5 MG/3ML) 0.083% neb solution USE ONE VIAL IN NEBULIZER EVERY 4 HOURS AS NEEDED FOR SHORTNESS OF BREATH /DYSPNEA 75 mL 3     aspirin (ASA) 81 MG chewable tablet Take 1 tablet (81 mg) by mouth daily       buPROPion (WELLBUTRIN) 75 MG tablet Take 2 tablets by mouth twice daily 360 tablet 1     LORazepam (ATIVAN) 0.5 MG tablet Take 1 tablet (0.5 mg) by mouth 2 times daily 60 tablet 3     melatonin 3 MG tablet Take 3 tablets (9 mg) by mouth At Bedtime       omeprazole (PRILOSEC) 40 MG DR capsule TAKE 1 CAPSULE EVERY DAY 90 capsule 1     order for DME Equipment being ordered: Oxygen  Expected life-long  Oxygen -  Resting on room air - 88%   Resting on 2 L of oxygen - 92%  Activity on room air - 82%  Activity on 2 L of oxygen - 87% 1 Device 11     SENNA-docusate sodium (SENNA S) 8.6-50 MG tablet Take 1 tablet by  mouth 2 times daily       sertraline (ZOLOFT) 50 MG tablet Take 3 tablets (150 mg) by mouth daily 270 tablet 1     tiotropium (SPIRIVA RESPIMAT) 2.5 MCG/ACT inhaler Inhale 1 puff into the lungs daily       VENTOLIN  (90 Base) MCG/ACT inhaler Inhale 2 puffs into the lungs every 6 hours as needed for shortness of breath / dyspnea 3 Inhaler 3     ROS:  10 point ROS of systems including Constitutional, Eyes, Respiratory, Cardiovascular, Gastroenterology, Genitourinary, Integumentary, Musculoskeletal, Psychiatric were all negative except for pertinent positives noted in my HPI.    Vitals:  /69   Pulse 106   Temp 98.3  F (36.8  C)   Resp 19   Ht 1.524 m (5')   Wt 33.2 kg (73 lb 3.2 oz)   LMP  (LMP Unknown)   SpO2 95%   BMI 14.30 kg/m    Exam:  GENERAL APPEARANCE:  in no distress, cooperative, thin  ENT:  NC in place, Mouth moist  EYES:  EOMI, Pupil rounded and equal.  RESP: -diffuse coarse sounds over posterior surface.   CV:  S1S2 audible, regular HR, no murmur appreciated.   ABDOMEN:  soft, NT/ND, BS audible. no mass appreciated on palpation.   M/S:   no joint deformity noted on observation.   SKIN:  Surgical sites over left hip and mid thigh dry, no erythema,   NEURO:   No NFD appreciated on observation. Hand  5/5 b/l  PSYCH:  normal insight, judgement and memory, affect and mood normal    Lab/Diagnostic data: Reviewed in the chart and EHR.        ASSESSMENT/PLAN:  ----------------------------  Recent mechanical fall resulted in non displaced left intertrochanter fx s/p ORIF  Acute musculoskeletal pain  - - Started rehab program, making a progress, continue until desired goal is achieved.  Likely will be discharged on 8/12 back home with family member. Will continue PT/OT at home. Orders in place  - Analgesia optimal with oxycodone 2.5 mg bid scheduled and 2.5 mg Q 1HR prn (do not exceed 5 tab daily).   - Followed by Orthopedic Team.     COPD, emphysema  (H):  Chronic hypoxic and hypercapnic  respiratory failure (H):  Recent covid19 positive infection  - at baseline.     Chronic dysphagia, recent esophagogram revealed very mild presbyesophagus  Query esophagitis candidiasis  - completed diflucan 10 days course  - will need EGD and dilation on outpatient basis    Anxiety: on ativan, chronically.     Discharge order:  - Follow up with PCP in 1-2 weeks  - follow with orthopedic routinely  - PT/OT/ home health and RN      Electronically signed by:  John Root MD         Sincerely,        John Root MD

## 2021-08-11 NOTE — PROGRESS NOTES
[facility] TCU DISCHARGE SUMMARY  PATIENT'S NAME: Tari Wiley : 1945 MRN: [id number] Place of Service where encounter took place:  [facility] [[id number]] PRIMARY CARE PROVIDER AND CLINIC RESPONSIBLE AFTER TRANSFER: Suzy Peters MD, [address] [place] [address] / [facility] [id number]. G Provider     Transferring providers: MARYJANE Bourgeois CNP, DNP & John Root MD.  Recent Hospitalization/ED: [facility] stay 21 to 21.  Date of TCU Admission: 21  Date of TCU (anticipated) Discharge: 21  Discharged to: previous independent home  Cognitive Scores: BIMS: 15/15  Physical Function: Wright Balance Scale: 53/56 and Ambulating 150 ft with 2ww.  DME: None.  CODE STATUS/ADVANCE DIRECTIVES DISCUSSION: DNR.  ALLERGIES: Sulfa drugs    DISCHARGE DIAGNOSIS/NURSING FACILITY COURSE:   Chronic obstructive pulmonary disease, unspecified COPD type (H)  Closed nondisplaced intertrochanteric fracture of left femur with routine healing, subsequent encounter  Sleep disturbances  Esophageal candidiasis (H)  Generalized anxiety disorder  Mild major depression (H)  Slow transit constipation    Saige presented to [facility] on  w/ multiple falls and associated pain. She was found to have a left non-displaced intertrochanteric fx of femur. She underwent left hip repair same-day. She had some post-op dysphagia, and an EGD was requested. She was then found to be COVID19 POSITIVE, and an esophagogram was performed instead. An EGD/dilation is recommended after her COVID recovery. She will follow-up w/ ortho on 21.    Saige presented to TCU on  s/p the above hospitalization. During her stay, her Oxygen was weaned from 3L to 2L, she completed a round of Azithromycin. She was given an increased dose of Melatonin. To treat her esophageal candidiasis, she completed a round of Diflucan. Her pain was well controlled with scheduled oxycodone and Tylenol. She had mild anxiety ongoing,  and she was continued on all of her regular medications.    Today, Ginger feels ready to return home. Her pain is minimal and she feels her function has mostly returned to baseline. She states she still has some mild SOB, but this is improving. She has a dry cough. She denies fever, chills, dizziness, headache, constipation/diarrhea. She denies nausea. She will return home w/ home care and the help of her grand-daughter (who is at bedside). Recommending palliative care consultation.        Past Medical History:  has a past medical history of ROE (generalised anxiety disorder), Hyperlipidemia LDL goal < 130, and Tobacco abuse.  Discharge Medications:  Current Outpatient Medications   Medication Sig Dispense Refill     acetaminophen (TYLENOL) 500 MG tablet Take 2 tablets (1,000 mg) by mouth 2 times daily       ADVAIR DISKUS 500-50 MCG/DOSE inhaler INHALE 1 PUFF INTO LUNGS EVERY 12 HOURS 3 each 3     albuterol (PROVENTIL) (2.5 MG/3ML) 0.083% neb solution USE ONE VIAL IN NEBULIZER EVERY 4 HOURS AS NEEDED FOR SHORTNESS OF BREATH /DYSPNEA 75 mL 3     aspirin (ASA) 81 MG chewable tablet Take 1 tablet (81 mg) by mouth daily       buPROPion (WELLBUTRIN) 75 MG tablet Take 2 tablets by mouth twice daily 360 tablet 1     LORazepam (ATIVAN) 0.5 MG tablet Take 1 tablet (0.5 mg) by mouth 2 times daily 60 tablet 3     melatonin 3 MG tablet Take 3 tablets (9 mg) by mouth At Bedtime       omeprazole (PRILOSEC) 40 MG DR capsule TAKE 1 CAPSULE EVERY DAY 90 capsule 1     order for DME Equipment being ordered: Oxygen  Expected life-long  Oxygen -  Resting on room air - 88%   Resting on 2 L of oxygen - 92%  Activity on room air - 82%  Activity on 2 L of oxygen - 87% 1 Device 11     SENNA-docusate sodium (SENNA S) 8.6-50 MG tablet Take 1 tablet by mouth 2 times daily       sertraline (ZOLOFT) 50 MG tablet Take 3 tablets (150 mg) by mouth daily 270 tablet 1     tiotropium (SPIRIVA RESPIMAT) 2.5 MCG/ACT inhaler Inhale 1 puff into the lungs  daily       VENTOLIN  (90 Base) MCG/ACT inhaler Inhale 2 puffs into the lungs every 6 hours as needed for shortness of breath / dyspnea 3 Inhaler 3       Controlled medications sent with patient: Medication Lorazepam electronically prescribed to [facility] pharmacy     ROS: 4 point ROS including Respiratory, CV, GI and , other than that noted in the HPI, is negative.    Physical Exam: Vitals: /73   Pulse 95   Temp 97.9  F (36.6  C)   Resp 20   Ht 1.524 m (5')   Wt 34 kg (75 lb)   LMP  (LMP Unknown)   SpO2 94%   BMI 14.65 kg/m    GENERAL APPEARANCE: Alert, in no distress, cooperative.   ENT: Mouth/posterior oropharynx intact w/ moist mucous membranes, hearing acuity [place].  EYES: EOM, conjunctivae, lids, pupils and irises normal, PERRL2.   RESP: Respiratory effort fair, no respiratory distress, On 2LO2 via NC.  ABDOMEN: Normal bowel sounds, soft, non-tender abdomen, and no masses palpated.  SKIN: Inspection/Palpation of skin and subcutaneous tissue baseline w/ fragility. No wounds/rashes noted.   NEURO: CN II-XII at patient's baseline, sensation baseline PPS.  PSYCH: Insight, judgement, and memory are baseline, affect and mood are happy/engaged.    Facility Labs: Labs done in SNF are in [place] EPIC. Please refer to them using [email or web address] Everywhere.    DISCHARGE PLAN:    Follow up labs: No labs orders/due    Medical Follow Up:  Follow up with primary care provider in 1-2 weeks    MTM referral needed and placed by this provider: No    Current [place] scheduled appointments: None.  Discharge Services: Home Care:  Occupational Therapy, Physical Therapy, Registered Nurse and Home Health Aide    Orders:  No new orders.    TOTAL DISCHARGE TIME:   Greater than 30 minutes    Electronically signed by:  Dr. Sandy Ryder, APRN CNP DNP  ______________      Documentation of Face-to-Face and Certification for Home Health Services   Patient: Tari Wiley YOB: 1945  MRN: [id number]  Today's  Date: 8/12/2021  I certify that patient: Tari Wiley is under my care and that I had a face-to-face encounter that meets the provider  face-to-face encounter requirements with this patient on: 8/12/2021. This encounter with the patient was in whole, or in part, for the following medical condition, which is the primary reason for home health care: COPD. I certify that, based on my findings, the following services are medically necessary home health services: Nursing, Occupational Therapy and Physical Therapy. My clinical findings support the need for the above services because: Nurse is needed: To provide assessment and oversight required in the home to assure adherence to the medical plan due to: COPD.., Occupational Therapy Services are needed to assess and treat cognitive ability and address ADL safety due to impairment in mobility. and Physical Therapy Services are needed to assess and treat the following functional impairments: mobility.    Further, I certify that my clinical findings support that this patient is homebound (i.e. absences from home require considerable and taxing effort and are for medical reasons or [employer]ism services or infrequently or of short duration when for other reasons) because: Requires assistance of another person or specialized equipment to access medical services because patient: Range of motion limitations prevents ability to exit home safely...    Based on the above findings. I certify that this patient is confined to the home and needs intermittent skilled nursing care, physical therapy and/or speech therapy.  The patient is under my care, and I have initiated the establishment of the plan of care.  This patient will be followed by a provider who will periodically review the plan of care.    Provider to give follow up care: Suzy Peters    Responsible Medicare certified PECOS Provider: MARYJANE Guallpa CNP DNP  Provider Signature: See electronic signature  associated with these discharge orders.  Date: 8/12/2021

## 2021-08-12 ENCOUNTER — DISCHARGE SUMMARY NURSING HOME (OUTPATIENT)
Dept: GERIATRICS | Facility: CLINIC | Age: 76
End: 2021-08-12
Payer: MEDICARE

## 2021-08-12 VITALS
BODY MASS INDEX: 14.72 KG/M2 | RESPIRATION RATE: 20 BRPM | TEMPERATURE: 97.9 F | HEIGHT: 60 IN | HEART RATE: 95 BPM | OXYGEN SATURATION: 94 % | SYSTOLIC BLOOD PRESSURE: 115 MMHG | WEIGHT: 75 LBS | DIASTOLIC BLOOD PRESSURE: 73 MMHG

## 2021-08-12 DIAGNOSIS — B37.81 ESOPHAGEAL CANDIDIASIS (H): ICD-10-CM

## 2021-08-12 DIAGNOSIS — F41.1 GENERALIZED ANXIETY DISORDER: ICD-10-CM

## 2021-08-12 DIAGNOSIS — K59.01 SLOW TRANSIT CONSTIPATION: ICD-10-CM

## 2021-08-12 DIAGNOSIS — S72.145D CLOSED NONDISPLACED INTERTROCHANTERIC FRACTURE OF LEFT FEMUR WITH ROUTINE HEALING, SUBSEQUENT ENCOUNTER: ICD-10-CM

## 2021-08-12 DIAGNOSIS — G47.9 SLEEP DISTURBANCES: ICD-10-CM

## 2021-08-12 DIAGNOSIS — F32.0 MILD MAJOR DEPRESSION (H): ICD-10-CM

## 2021-08-12 DIAGNOSIS — J44.9 CHRONIC OBSTRUCTIVE PULMONARY DISEASE, UNSPECIFIED COPD TYPE (H): Primary | ICD-10-CM

## 2021-08-12 PROCEDURE — 99316 NF DSCHRG MGMT 30 MIN+: CPT | Performed by: NURSE PRACTITIONER

## 2021-08-12 RX ORDER — LORAZEPAM 0.5 MG/1
0.5 TABLET ORAL 2 TIMES DAILY
Qty: 10 TABLET | Refills: 3 | Status: SHIPPED | OUTPATIENT
Start: 2021-08-12 | End: 2021-09-08

## 2021-08-12 ASSESSMENT — MIFFLIN-ST. JEOR: SCORE: 756.7

## 2021-08-12 NOTE — LETTER
2021        RE: Tari Wiley  [address] [facility] [address]        [facility] TCU DISCHARGE SUMMARY  PATIENT'S NAME: Tari Wiley : 1945 MRN: [id number] Place of Service where encounter took place:  [facility] ([facility]NF) [[id number]] PRIMARY CARE PROVIDER AND CLINIC RESPONSIBLE AFTER TRANSFER: Suzy Peters MD, [address] [place] [id number]. [facility] Provider     Transferring providers: MARYJANE Bourgeois CNP DNP & John Root MD.  Recent Hospitalization/ED: [facility] stay 21 to 21.  Date of TCU Admission: 21  Date of TCU (anticipated) Discharge: 21  Discharged to: previous independent home  Cognitive Scores: BIMS: 15/15  Physical Function: Wright Balance Scale: 53/56 and Ambulating 150 ft with 2ww.  DME: None.  CODE STATUS/ADVANCE DIRECTIVES DISCUSSION: DNR.  ALLERGIES: Sulfa drugs    DISCHARGE DIAGNOSIS/NURSING FACILITY COURSE:   Chronic obstructive pulmonary disease, unspecified COPD type (H)  Closed nondisplaced intertrochanteric fracture of left femur with routine healing, subsequent encounter  Sleep disturbances  Esophageal candidiasis (H)  Generalized anxiety disorder  Mild major depression (H)  Slow transit constipation    Saige presented to [facility] on  w/ multiple falls and associated pain. She was found to have a left non-displaced intertrochanteric fx of femur. She underwent left hip repair same-day. She had some post-op dysphagia, and an EGD was requested. She was then found to be COVID19 POSITIVE, and an esophagogram was performed instead. An EGD/dilation is recommended after her COVID recovery. She will follow-up w/ ortho on 21.    Saige presented to TCU on  s/p the above hospitalization. During her stay, her Oxygen was weaned from 3L to 2L, she completed a round of Azithromycin. She was given an increased dose of Melatonin. To treat her esophageal candidiasis, she completed a round of  Diflucan. Her pain was well controlled with scheduled oxycodone and Tylenol. She had mild anxiety ongoing, and she was continued on all of her regular medications.    Today, Ginger feels ready to return home. Her pain is minimal and she feels her function has mostly returned to baseline. She states she still has some mild SOB, but this is improving. She has a dry cough. She denies fever, chills, dizziness, headache, constipation/diarrhea. She denies nausea. She will return home w/ home care and the help of her grand-daughter (who is at bedside). Recommending palliative care consultation.        Past Medical History:  has a past medical history of ROE (generalised anxiety disorder), Hyperlipidemia LDL goal < 130, and Tobacco abuse.  Discharge Medications:  Current Outpatient Medications   Medication Sig Dispense Refill     acetaminophen (TYLENOL) 500 MG tablet Take 2 tablets (1,000 mg) by mouth 2 times daily       ADVAIR DISKUS 500-50 MCG/DOSE inhaler INHALE 1 PUFF INTO LUNGS EVERY 12 HOURS 3 each 3     albuterol (PROVENTIL) (2.5 MG/3ML) 0.083% neb solution USE ONE VIAL IN NEBULIZER EVERY 4 HOURS AS NEEDED FOR SHORTNESS OF BREATH /DYSPNEA 75 mL 3     aspirin (ASA) 81 MG chewable tablet Take 1 tablet (81 mg) by mouth daily       buPROPion (WELLBUTRIN) 75 MG tablet Take 2 tablets by mouth twice daily 360 tablet 1     LORazepam (ATIVAN) 0.5 MG tablet Take 1 tablet (0.5 mg) by mouth 2 times daily 60 tablet 3     melatonin 3 MG tablet Take 3 tablets (9 mg) by mouth At Bedtime       omeprazole (PRILOSEC) 40 MG DR capsule TAKE 1 CAPSULE EVERY DAY 90 capsule 1     order for DME Equipment being ordered: Oxygen  Expected life-long  Oxygen -  Resting on room air - 88%   Resting on 2 L of oxygen - 92%  Activity on room air - 82%  Activity on 2 L of oxygen - 87% 1 Device 11     SENNA-docusate sodium (SENNA S) 8.6-50 MG tablet Take 1 tablet by mouth 2 times daily       sertraline (ZOLOFT) 50 MG tablet Take 3 tablets (150 mg) by  mouth daily 270 tablet 1     tiotropium (SPIRIVA RESPIMAT) 2.5 MCG/ACT inhaler Inhale 1 puff into the lungs daily       VENTOLIN  (90 Base) MCG/ACT inhaler Inhale 2 puffs into the lungs every 6 hours as needed for shortness of breath / dyspnea 3 Inhaler 3       Controlled medications sent with patient: Medication Lorazepam electronically prescribed to [facility] pharmacy     ROS: 4 point ROS including Respiratory, CV, GI and , other than that noted in the HPI, is negative.    Physical Exam: Vitals: /73   Pulse 95   Temp 97.9  F (36.6  C)   Resp 20   Ht 1.524 m (5')   Wt 34 kg (75 lb)   LMP  (LMP Unknown)   SpO2 94%   BMI 14.65 kg/m    GENERAL APPEARANCE: Alert, in no distress, cooperative.   ENT: Mouth/posterior oropharynx intact w/ moist mucous membranes, hearing acuity [place].  EYES: EOM, conjunctivae, lids, pupils and irises normal, PERRL2.   RESP: Respiratory effort fair, no respiratory distress, On 2LO2 via NC.  ABDOMEN: Normal bowel sounds, soft, non-tender abdomen, and no masses palpated.  SKIN: Inspection/Palpation of skin and subcutaneous tissue baseline w/ fragility. No wounds/rashes noted.   NEURO: CN II-XII at patient's baseline, sensation baseline PPS.  PSYCH: Insight, judgement, and memory are baseline, affect and mood are happy/engaged.    Facility Labs: Labs done in SNF are in [place] EPIC. Please refer to them using EPIC/Care Everywhere.    DISCHARGE PLAN:    Follow up labs: No labs orders/due    Medical Follow Up:  Follow up with primary care provider in 1-2 weeks    [facility] referral needed and placed by this provider: No    Current [place] scheduled appointments: None.  Discharge Services: Home Care:  Occupational Therapy, Physical Therapy, Registered Nurse and Home Health Aide    Orders:  No new orders.    TOTAL DISCHARGE TIME:   Greater than 30 minutes    Electronically signed by:  MARYJANE Guallpa CNP DNP  ______________      Documentation of Face-to-Face and  Certification for Home Health Services   Patient: Tari Wiley YOB: 1945  MRN: [id number]  Today's Date: 8/12/2021  I certify that patient: Tari Wiley is under my care and that I had a face-to-face encounter that meets the provider  face-to-face encounter requirements with this patient on: 8/12/2021. This encounter with the patient was in whole, or in part, for the following medical condition, which is the primary reason for home health care: COPD. I certify that, based on my findings, the following services are medically necessary home health services: Nursing, Occupational Therapy and Physical Therapy. My clinical findings support the need for the above services because: Nurse is needed: To provide assessment and oversight required in the home to assure adherence to the medical plan due to: COPD.., Occupational Therapy Services are needed to assess and treat cognitive ability and address ADL safety due to impairment in mobility. and Physical Therapy Services are needed to assess and treat the following functional impairments: mobility.    Further, I certify that my clinical findings support that this patient is homebound (i.e. absences from home require considerable and taxing effort and are for medical reasons or Sabian[employer] services or infrequently or of short duration when for other reasons) because: Requires assistance of another person or specialized equipment to access medical services because patient: Range of motion limitations prevents ability to exit home safely...    Based on the above findings. I certify that this patient is confined to the home and needs intermittent skilled nursing care, physical therapy and/or speech therapy.  The patient is under my care, and I have initiated the establishment of the plan of care.  This patient will be followed by a provider who will periodically review the plan of care.    Provider to give follow up care: Suzy Peters  [place]    Responsible Medicare certified PECOS Provider: MARYJANE Guallpa CNP DNP  Provider Signature: See electronic signature associated with these discharge orders.  Date: 8/12/2021                   Sincerely,        MARYJANE Cardoza CNP

## 2021-08-16 ENCOUNTER — TELEPHONE (OUTPATIENT)
Dept: FAMILY MEDICINE | Facility: CLINIC | Age: 76
End: 2021-08-16

## 2021-08-16 DIAGNOSIS — F32.0 MAJOR DEPRESSIVE DISORDER, SINGLE EPISODE, MILD (H): ICD-10-CM

## 2021-08-16 DIAGNOSIS — S72.145D CLOSED NONDISPLACED INTERTROCHANTERIC FRACTURE OF LEFT FEMUR WITH ROUTINE HEALING, SUBSEQUENT ENCOUNTER: ICD-10-CM

## 2021-08-16 NOTE — TELEPHONE ENCOUNTER
Reason for Call:  Medication or medication refill:    Do you use a [facility]?  Name of the pharmacy and phone number for the current request:  [facility] in [place]    Name of the medication requested: pain medication    Other request: Patient scheduled a virtual follow up appointment for a TCU stay and an in person appointment for Dr. Peters's soonest available for a medication check but will run out of pain medication before that appointment. She is hoping to get a few days refill to last until her appointment on 9/10.     Can we leave a detailed message on this number? YES    Phone number patient can be reached at: Home number on file [phone] (home)    Best Time: any time    Call taken on 8/16/2021 at 11:21 AM by Lidia Dunn

## 2021-08-16 NOTE — TELEPHONE ENCOUNTER
I talked with [place] and she says Saige is taking oxy 2.5 mg.  Was prescribed after surgery.  She will run out before her virtual visit with Dr Peters.    Also, she is taking liquid bupropion.  This is not in Epic, needs to be compounded.  Speciality compounding number is [phone].  Could be sent to [facility] pharmacy and they are #43.    Need to recall Nedi, need to know where she is getting liquid bupropion   Petra Colbert RN

## 2021-08-17 ENCOUNTER — NURSE TRIAGE (OUTPATIENT)
Dept: NURSING | Facility: CLINIC | Age: 76
End: 2021-08-17

## 2021-08-17 ENCOUNTER — TELEPHONE (OUTPATIENT)
Dept: FAMILY MEDICINE | Facility: CLINIC | Age: 76
End: 2021-08-17

## 2021-08-17 RX ORDER — BUPROPION HYDROCHLORIDE 75 MG/1
TABLET ORAL
Qty: 360 TABLET | Refills: 1 | COMMUNITY
Start: 2021-08-17 | End: 2022-01-18

## 2021-08-17 RX ORDER — OXYCODONE HYDROCHLORIDE 5 MG/1
TABLET ORAL
Qty: 60 TABLET | Refills: 0 | Status: SHIPPED | OUTPATIENT
Start: 2021-08-17 | End: 2021-09-06

## 2021-08-17 NOTE — TELEPHONE ENCOUNTER
Consent to communicate to granddaughter given by patient.   Message rec'd from Dr Peters's care team about her medications: Lorazepam and Oxycodone. She has been taking them for the past month since she had femur surgery-helena placed. She is taking Lorazepam twice a day. She has been taking it with Oxy twice a day without a problem noted.  Patient is staying with granddaalisa Bhardwaj at this time.   Message from Dr MARLENE Peters given to granddaughter.   She will space apart the Lorazepam and Oxycodone administration--she will discuss with pharmacist as to how far apart medications should be given.     Liquid bupropion to be sent to [facility] to be mailed to patient.     Message will be forwarded to provider.     Elidia Lee RN Triage Nurse Advisor 6:46 PM 8/17/2021    Reason for Disposition    [1] Caller has NON-URGENT medication question about med that PCP prescribed AND [2] triager unable to answer question    Additional Information    Negative: Drug overdose and triager unable to answer question    Negative: Caller requesting information unrelated to medicine    Negative: Caller requesting a prescription for Strep throat and has a positive culture result    Negative: Rash while taking a medication or within 3 days of stopping it    Negative: Immunization reaction suspected    Negative: [1] Asthma and [2] having symptoms of asthma (cough, wheezing, etc.)    Negative: [1] Influenza symptoms AND [2] anti-viral med prescription request, such as Tamiflu    Negative: [1] Symptom of illness (e.g., headache, abdominal pain, earache, vomiting) AND [2] more than mild    Negative: MORE THAN A DOUBLE DOSE of a prescription or over-the-counter (OTC) drug    Negative: [1] DOUBLE DOSE (an extra dose or lesser amount) of over-the-counter (OTC) drug AND [2] any symptoms (e.g., dizziness, nausea, pain, sleepiness)    Negative: [1] DOUBLE DOSE (an extra dose or lesser amount) of prescription drug AND [2] any symptoms  "(e.g., dizziness, nausea, pain, sleepiness)    Negative: Took another person's prescription drug    Negative: [1] DOUBLE DOSE (an extra dose or lesser amount) of prescription drug AND [2] NO symptoms (Exception: a double dose of antibiotics)    Negative: Diabetes drug error or overdose (e.g., took wrong type of insulin or took extra dose)    Negative: [1] Request for URGENT new prescription or refill of \"essential\" medication (i.e., likelihood of harm to patient if not taken) AND [2] triager unable to fill per unit policy    Negative: [1] Prescription not at pharmacy AND [2] was prescribed by PCP recently    Negative: [1] Pharmacy calling with prescription questions AND [2] triager unable to answer question    Negative: [1] Caller has URGENT medication question about med that PCP or specialist prescribed AND [2] triager unable to answer question    Protocols used: MEDICATION QUESTION CALL-A-AH      "

## 2021-08-17 NOTE — TELEPHONE ENCOUNTER
Talked to graned daughter and they will not give benzo and oxy together they are just using pain meds now

## 2021-08-17 NOTE — TELEPHONE ENCOUNTER
JANNETH Peters:   Received transferred call from [facility] at [facility]. She is a physical therapist seeing patient. Saige has had some recent changes to her medications.  [facility] is required to report drug to drug interaction alerts which state to monitor closely.  bupropion and ondansetron; bupropion and oxycodone; bupropion and sertraline; lorazepam and oxycodone; ondansetron and sertraline.   Patient lives with her granddaughter Benton who is a nurse. [facility] offered a home care nurse visit for medication education which Benotn declined.   Mandy MCCOY RN

## 2021-08-17 NOTE — TELEPHONE ENCOUNTER
Reason for Call:  Other FYI     Detailed comments: Constance with [facility], had her 2nd PT Home Care visit and was reporting some issues with her drug interactions.  Stating she has just had her medications switched up to liquid do to swallowing  Issues.  Report by Daughter a RN doesn't need to have a RN from  check on this.    Phone Number Patient can be reached at: Other phone number:  Constance is at [phone]    Best Time: any    Can we leave a detailed message on this number? YES    Call taken on 8/17/2021 at 2:59 PM by Maria Luz Swain

## 2021-08-18 ENCOUNTER — TELEPHONE (OUTPATIENT)
Dept: FAMILY MEDICINE | Facility: CLINIC | Age: 76
End: 2021-08-18

## 2021-08-18 NOTE — TELEPHONE ENCOUNTER
mian is calling and wanting to know about the dosages on Asprin can pt go down once a day instead of TID, it bothering pt stomach.    Samantha [facility]

## 2021-08-18 NOTE — TELEPHONE ENCOUNTER
A prior authorization is needed for the following compounded medications prescribed.  Please complete a prior authorization with the information included below.    Medication: Buproprion HCL 20mg/ml susp (compound)    Ingredients                                                                  NDCs                                                Quantities                       Buproprion HCL 100mg tabs    [address]-06   42.000  Propylene glycol liqd     [id number]   10.500  Ora-sweet syrp     [id number]   105.000  Ora-plus liqd      [id number]   52.500        RX #:[id number]  Reason for Rejection:product not on formulary     Pharmacy Insurance plan: [employer] part D  BIN #:[id number]  ID #:[id number]  N #:[id number]  Phone #:[phone]      Pharmacy NPI:[id number]      Please advise the [facility] @ [phone] when the prior authorization is approved or denied.     Thank you for your time.    Brittney Casiano CPhT, MYLES    [facility]  [address] [place] [id number]   [email or web address]  [email or web address]   Phone: [phone]  Fax: [phone]

## 2021-08-18 NOTE — TELEPHONE ENCOUNTER
There is no rx for Buproprion HCL 20mg/ml susp (compound) on the patient's chart. Please add rx and route encounter back to the pa team. Thanks

## 2021-08-18 NOTE — TELEPHONE ENCOUNTER
Spoke with Benton, should be one baby asa per day. She says Ginger is doing well. Will call with updates as needed

## 2021-08-20 ENCOUNTER — TELEPHONE (OUTPATIENT)
Dept: FAMILY MEDICINE | Facility: CLINIC | Age: 76
End: 2021-08-20

## 2021-08-20 NOTE — TELEPHONE ENCOUNTER
Prior Authorization Retail Medication Request    Medication/Dose: oxycodone  ICD code (if different than what is on RX):  Closed nondisplaced intertrochanteric fracture of left femur with routine healing, subsequent encounter [S72.145D]   Previously Tried and Failed:     Rationale:  Pt is monitored on this med.  Will problaly use for 20 days, max of 5 tabs per day.     Insurance Name:  [phone]  Insurance ID:  [id number]      Pharmacy Information (if different than what is on RX)  Name:  [facility]  Phone:

## 2021-08-20 NOTE — TELEPHONE ENCOUNTER
Central Prior Authorization Team   Phone: [phone]    PA Initiation    Medication: oxycodone 5mg  Insurance Company: [employer] - Phone [phone] Fax [phone]  Pharmacy Filling the Rx: [facility] #[id number] - [place], [place] - [address] [facility] [address]  Filling Pharmacy Phone: [phone]  Filling Pharmacy Fax:    Start Date: 8/20/2021

## 2021-08-20 NOTE — TELEPHONE ENCOUNTER
Prior Authorization Approval    Authorization Effective Date: 8/20/2021  Authorization Expiration Date: 12/31/2021  Medication: oxycodone 5mg  Approved Dose/Quantity:   Reference #:     Insurance Company: [email or web address] - Phone [phone] Fax [phone]  Expected CoPay:       CoPay Card Available:      Foundation Assistance Needed:    Which Pharmacy is filling the prescription (Not needed for infusion/clinic administered): [id number] - [place], [place] - [address] AT [facility] [address]  Pharmacy Notified: Yes  Patient Notified: Yes

## 2021-08-30 ENCOUNTER — TELEPHONE (OUTPATIENT)
Dept: FAMILY MEDICINE | Facility: CLINIC | Age: 76
End: 2021-08-30

## 2021-08-30 NOTE — TELEPHONE ENCOUNTER
S-(situation): Reports increased cough, wheezing, SOA since yesterday. Has F/U TCU VV with Dr. Peters tomorrow, asking if can get abx.      B-(background): Hx COPD. States has had cough which is getting worse, coughing up greenish colored sputum. Some wheezing. Increased exertional SOA, subsided with rest. On 02 3 L/M cont, sats >90% at rest, 83% with exertion. Tired. No fevers, chills.  Up walking short distances s/p July hip fx, ORIF. Using inhalant meds as prescribed. Has not used albuterol neb as does not have with her at [facility].    A-(assessment): States resp sx have worsened since yesterday. No COVID recent COVID exp concerns. Fully vaccinated.    R-(recommendations): Advised UC this kaylene. Pt declines.  Advise to have granddtr get neb machine/ solution from home today and start.   Will forward to PCP for review in AM, possible work in for earlier VV tomorrow?  TONE Walton RN

## 2021-08-31 ENCOUNTER — VIRTUAL VISIT (OUTPATIENT)
Dept: FAMILY MEDICINE | Facility: CLINIC | Age: 76
End: 2021-08-31
Payer: MEDICARE

## 2021-08-31 DIAGNOSIS — J44.1 COPD EXACERBATION (H): Primary | ICD-10-CM

## 2021-08-31 DIAGNOSIS — S72.8X2D OTHER CLOSED FRACTURE OF LEFT FEMUR WITH ROUTINE HEALING, UNSPECIFIED PORTION OF FEMUR, SUBSEQUENT ENCOUNTER: ICD-10-CM

## 2021-08-31 PROCEDURE — 99214 OFFICE O/P EST MOD 30 MIN: CPT | Mod: 95 | Performed by: FAMILY MEDICINE

## 2021-08-31 RX ORDER — AZITHROMYCIN 250 MG/1
TABLET, FILM COATED ORAL
Qty: 6 TABLET | Refills: 0 | Status: SHIPPED | OUTPATIENT
Start: 2021-08-31 | End: 2021-09-08

## 2021-08-31 RX ORDER — PREDNISONE 10 MG/1
TABLET ORAL
Qty: 65 TABLET | Refills: 0 | Status: SHIPPED | OUTPATIENT
Start: 2021-08-31 | End: 2022-01-01

## 2021-08-31 NOTE — PROGRESS NOTES
Ginger is a 76 year old who is being evaluated via a billable video visit.      How would you like to obtain your AVS? MyChart  If the video visit is dropped, the invitation should be resent by: Text to cell phone: [phone] - Benton  Will anyone else be joining your video visit? No    Video Start Time: 8:26 AM    Assessment & Plan     COPD exacerbation (H)    - azithromycin (ZITHROMAX) 250 MG tablet; Two tablets first day, then one tablet daily for four days.  - predniSONE (DELTASONE) 10 MG tablet; 6 tabs daily for 5 days then 4 tabs daily for 5 days then 2 tabs daily for 5 days then 1 tab daily for 5 days.  Albuterol as needed     Other closed fracture of left femur with routine healing, unspecified portion of femur, subsequent encounter  Using oxy for pain 2 times a day scheduled and PRN 1-2 times per day   She did use ativan for SOA and anxiety when she got SOA but did advise to not use the narcotic and benzos together       Review of external notes as documented elsewhere in note             Return in about 1 week (around 9/7/2021).    Suzy Peters MD  [facility]    Subjective   Ginger is a 76 year old who presents for the following health issues  accompanied by her daughter:    HPI     Acute Illness  Acute illness concerns: Coughing since discharged from TCU - mostly worsend Saturday Sunday   Onset/Duration: 3-4 days  Symptoms:  Fever: no  Chills/Sweats: no - did get pretty warm on Sunday  Headache (location?): no  Sinus Pressure: no  Conjunctivitis:  no  Ear Pain: no  Rhinorrhea: YES with sneezing  Congestion: no  Sore Throat: no  Cough: YES-productive of green sputum  Wheeze: YES  Decreased Appetite: YES  Nausea: no  Vomiting: no  Diarrhea: no  Dysuria/Freq.: no  Dysuria or Hematuria: no  Fatigue/Achiness: YES- fatigue- very sleepy, slept most of day yesterday  Sick/Strep Exposure: no  Therapies tried and outcome: tylenol and oxy all prescribed meds.  Inhalers - getting  nebulizer today    Weight 74 lbs today appetite has been better   Ensure 1 shake and benacal with meals    Living with grand daughter and this is going well     Leg pain from fracture is improving but knee is hurting now some     Oxy 2.5 bid and prn   PT once a week           Review of Systems   Constitutional, HEENT, cardiovascular, pulmonary, gi and gu systems are negative, except as otherwise noted.      Objective           Vitals:  No vitals were obtained today due to virtual visit.    Physical Exam   GENERAL: Healthy, alert and no distress  EYES: Eyes grossly normal to inspection.  No discharge or erythema, or obvious scleral/conjunctival abnormalities.  RESP: No audible wheeze, cough, or visible cyanosis.  No visible retractions or increased work of breathing.    SKIN: Visible skin clear. No significant rash, abnormal pigmentation or lesions.  NEURO: Cranial nerves grossly intact.  Mentation and speech appropriate for age.  PSYCH: Mentation appears normal, affect normal/bright, judgement and insight intact, normal speech and appearance well-groomed.                Video-Visit Details    Type of service:  Video Visit    Video End Time:855am    Originating Location (pt. Location): Home    Distant Location (provider location):  [facility]     Platform used for Video Visit: Jeannie

## 2021-09-03 ENCOUNTER — TELEPHONE (OUTPATIENT)
Dept: FAMILY MEDICINE | Facility: CLINIC | Age: 76
End: 2021-09-03

## 2021-09-03 NOTE — TELEPHONE ENCOUNTER
Reason for Call:  Verbal Orders    Detailed comments:   Wound - Left upper thigh hip area.     1) Skilled nursing Orders Eval and treat wound  2) wound Care orders [facility] is recommending  Cleanse with wound , apply aqua cell Ag and cover with Aqua foam dressing or equivalent. Change two times a week as needed.    Requesting this order to be faxed [phone]          Call taken on 9/3/2021 at 3:38 PM by Samantha Cardenas

## 2021-09-08 ENCOUNTER — VIRTUAL VISIT (OUTPATIENT)
Dept: FAMILY MEDICINE | Facility: CLINIC | Age: 76
End: 2021-09-08
Payer: MEDICARE

## 2021-09-08 VITALS — DIASTOLIC BLOOD PRESSURE: 80 MMHG | BODY MASS INDEX: 14.65 KG/M2 | WEIGHT: 75 LBS | SYSTOLIC BLOOD PRESSURE: 138 MMHG

## 2021-09-08 DIAGNOSIS — S72.8X2D OTHER CLOSED FRACTURE OF LEFT FEMUR WITH ROUTINE HEALING, UNSPECIFIED PORTION OF FEMUR, SUBSEQUENT ENCOUNTER: ICD-10-CM

## 2021-09-08 DIAGNOSIS — F41.1 GENERALIZED ANXIETY DISORDER: ICD-10-CM

## 2021-09-08 DIAGNOSIS — J44.1 COPD EXACERBATION (H): Primary | ICD-10-CM

## 2021-09-08 PROCEDURE — 99214 OFFICE O/P EST MOD 30 MIN: CPT | Mod: 95 | Performed by: FAMILY MEDICINE

## 2021-09-08 RX ORDER — LORAZEPAM 0.5 MG/1
0.5 TABLET ORAL 2 TIMES DAILY
Qty: 60 TABLET | Refills: 3 | Status: SHIPPED | OUTPATIENT
Start: 2021-09-08 | End: 2021-10-04

## 2021-09-08 NOTE — PROGRESS NOTES
Ginger is a 76 year old who is being evaluated via a billable video visit.      How would you like to obtain your AVS? MyChart  If the video visit is dropped, the invitation should be resent by: Text to cell phone: Bobby - [phone]  Will anyone else be joining your video visit? No    Video Start Time: 4:15 PM    Assessment & Plan     COPD exacerbation (H)  Improved   Complete pred  Stay on oxygen        Other closed fracture of left femur with routine healing, unspecified portion of femur, subsequent encounter  Improved mobility and pain   Decrease oxy to half tab bid   Goal is to get off of oxy     Generalized anxiety disorder  Use as needed SOA or panic  - LORazepam (ATIVAN) 0.5 MG tablet; Take 1 tablet (0.5 mg) by mouth 2 times daily                 Return in about 3 weeks (around 9/29/2021) for using a video visit, with me at 340 pm .    Suzy Peters MD  [facility]    Nia Moulton is a 76 year old who presents for the following health issues  accompanied by her grand daughter:    HPI     COPD exacerbation   Cough is getting better  No green any longer  Finished Z-nevin and still on Prednisone  Improved using less oxygen   Sat staying at 93-94%      S/P Femur fracture   Using Oxy for pain in am and pm tylenol through the day  Pain has gotten better  Still having PT at home    Pressure wound left hip  Home Nursing started this week for wound - Left hip  Wound - Left upper thigh hip area.      1) Skilled nursing Orders Eval and treat wound  2) wound Care orders Vandana is recommending  Cleanse with wound , apply aqua cell Ag and cover with Aqua foam dressing or equivalent. Change two times a week as needed.        Review of Systems   Constitutional, HEENT, cardiovascular, pulmonary, gi and gu systems are negative, except as otherwise noted.      Objective           Vitals:  No vitals were obtained today due to virtual visit.    Physical Exam   GENERAL: Healthy, alert  and no distress  EYES: Eyes grossly normal to inspection.  No discharge or erythema, or obvious scleral/conjunctival abnormalities.  RESP: No audible wheeze, cough, or visible cyanosis.  No visible retractions or increased work of breathing.    NEURO: Cranial nerves grossly intact.  Mentation and speech appropriate for age.  PSYCH: Mentation appears normal, affect normal/bright, judgement and insight intact, normal speech and appearance well-groomed.                Video-Visit Details    Type of service:  Video Visit    Video End Time:4:28 PM    Originating Location (pt. Location): Home    Distant Location (provider location):  [facility]     Platform used for Video Visit: SolFabrus

## 2021-09-14 ENCOUNTER — MEDICAL CORRESPONDENCE (OUTPATIENT)
Dept: HEALTH INFORMATION MANAGEMENT | Facility: CLINIC | Age: 76
End: 2021-09-14

## 2021-09-14 ENCOUNTER — TELEPHONE (OUTPATIENT)
Dept: FAMILY MEDICINE | Facility: CLINIC | Age: 76
End: 2021-09-14

## 2021-09-15 NOTE — TELEPHONE ENCOUNTER
The rx was written for the commercially available medication (Tablet form). MD included in notes need in liquid form . Please refer to 8/17/2021 RX on file for Bupropion 75mg Tablets. This was converted, per MD request to Bupropion 20mg/ml Susp that is compounded. This medication is not on formulary and requires a prior authorization backdated to 8/17/2021. Pt has been paying out of pocket cash cost for all fills since 8/17/2021.     Thank you   Maryjane Frederick  [facility]    [phone]'

## 2021-09-23 ENCOUNTER — NURSE TRIAGE (OUTPATIENT)
Dept: NURSING | Facility: CLINIC | Age: 76
End: 2021-09-23

## 2021-09-23 ENCOUNTER — TELEPHONE (OUTPATIENT)
Dept: FAMILY MEDICINE | Facility: CLINIC | Age: 76
End: 2021-09-23

## 2021-09-23 ENCOUNTER — LAB (OUTPATIENT)
Dept: LAB | Facility: CLINIC | Age: 76
End: 2021-09-23
Payer: MEDICARE

## 2021-09-23 DIAGNOSIS — N39.0 URINARY TRACT INFECTION: Primary | ICD-10-CM

## 2021-09-23 DIAGNOSIS — L08.9 LOCAL INFECTION OF WOUND: Primary | ICD-10-CM

## 2021-09-23 DIAGNOSIS — T14.8XXA LOCAL INFECTION OF WOUND: Primary | ICD-10-CM

## 2021-09-23 DIAGNOSIS — N39.0 URINARY TRACT INFECTION: ICD-10-CM

## 2021-09-23 LAB
ALBUMIN UR-MCNC: 30 MG/DL
AMORPH CRY #/AREA URNS HPF: ABNORMAL /HPF
APPEARANCE UR: ABNORMAL
BACTERIA #/AREA URNS HPF: ABNORMAL /HPF
BILIRUB UR QL STRIP: NEGATIVE
CAOX CRY #/AREA URNS HPF: ABNORMAL /HPF
COLOR UR AUTO: YELLOW
GLUCOSE UR STRIP-MCNC: NEGATIVE MG/DL
HGB UR QL STRIP: ABNORMAL
HYALINE CASTS #/AREA URNS LPF: ABNORMAL /LPF
KETONES UR STRIP-MCNC: NEGATIVE MG/DL
LEUKOCYTE ESTERASE UR QL STRIP: ABNORMAL
MUCOUS THREADS #/AREA URNS LPF: PRESENT /LPF
NITRATE UR QL: POSITIVE
PH UR STRIP: 5.5 [PH] (ref 5–8)
RBC #/AREA URNS AUTO: ABNORMAL /HPF
SP GR UR STRIP: >=1.03 (ref 1–1.03)
SQUAMOUS #/AREA URNS AUTO: ABNORMAL /LPF
UROBILINOGEN UR STRIP-ACNC: 0.2 E.U./DL
WBC #/AREA URNS AUTO: ABNORMAL /HPF

## 2021-09-23 PROCEDURE — 87186 SC STD MICRODIL/AGAR DIL: CPT

## 2021-09-23 PROCEDURE — 87086 URINE CULTURE/COLONY COUNT: CPT

## 2021-09-23 PROCEDURE — 81001 URINALYSIS AUTO W/SCOPE: CPT

## 2021-09-23 NOTE — TELEPHONE ENCOUNTER
Call placed to granddaughter.    Patient has had foul smelling urine 1 week. Now has chills/ hot flashes.    Denies fever. Received home care and no change in blood pressure or pulse.    Advised to seek evaluation through er uc.  Granddaughter does not want to do this nor does patient      Dr Peters contacted. Will order mariza Darling results when available.

## 2021-09-23 NOTE — TELEPHONE ENCOUNTER
Call placed to Dr Peters. Reviewed note from home care RN.   Received order for wound culture and antibiotic.    Call placed to home care RN Sandi. She is not at the home at this time. She will collect a wound culture tomorrow.  Orders faxed as requested.    Call placed to granddaalisa Euceda. Updated to new orders for wound culture. Home care rn will collect wound culture tomorrow. Updated to antibiotic ordered, augmentin bid 10 days. Instructed to continue until all pills are gone.  She is advised to collect urine and bring to lab.    Questions answered.

## 2021-09-23 NOTE — TELEPHONE ENCOUNTER
sandi, [facility] called looking for verbal order     Report concerns form today's visit    Left hip wound from previously hip surgery--purulent drainage 3x3 or dime size,.Roughly a moderate amount of drainage with slight odor. Wound culture, or what to do with wound.--patient feel like it is worsening. Patient reporting chills, but no fever.  This started 2-3 days ago.     Sandi reports during exam, reports wheezing on left lower lobe. Reports no other sx, will start using her neb- duoneb.       Please fax new order to [phone]      Lily WAYNE  [facility]

## 2021-09-23 NOTE — TELEPHONE ENCOUNTER
Reason for call:  Patient reporting a symptom    Symptom or request: CHILLS AND HOT FLASHES  No fever though    Duration (how long have symptoms been present): *  Have you been treated for this before? FOR ABOUT A WEEK    Additional comments: Granddaughter is concerned and would like the homecare nurse who is coming today to collect a urine sample so it can be tested for UTI  Odorous urine, dispute being well hydrated  Recently treated for brinchitis.    Phone Number patient can be reached at:  Home number on file [phone] (home)    Best Time:  any    Can we leave a detailed message on this number:  YES    Call taken on 9/23/2021 at 11:44 AM by Magui Georges  Reason for call:  Patient reporting a symptom

## 2021-09-24 ENCOUNTER — TELEPHONE (OUTPATIENT)
Dept: FAMILY MEDICINE | Facility: CLINIC | Age: 76
End: 2021-09-24

## 2021-09-24 ENCOUNTER — LAB (OUTPATIENT)
Dept: LAB | Facility: CLINIC | Age: 76
End: 2021-09-24
Payer: MEDICARE

## 2021-09-24 DIAGNOSIS — L08.9 LOCAL INFECTION OF WOUND: ICD-10-CM

## 2021-09-24 DIAGNOSIS — T14.8XXA LOCAL INFECTION OF WOUND: ICD-10-CM

## 2021-09-24 PROCEDURE — 87077 CULTURE AEROBIC IDENTIFY: CPT

## 2021-09-24 PROCEDURE — 87205 SMEAR GRAM STAIN: CPT

## 2021-09-24 PROCEDURE — 87186 SC STD MICRODIL/AGAR DIL: CPT

## 2021-09-24 PROCEDURE — 87070 CULTURE OTHR SPECIMN AEROBIC: CPT

## 2021-09-24 NOTE — TELEPHONE ENCOUNTER
Patient's granddaughter calling reporting patient had a heart rate of 166. States her current heart now is 141. Reports feeling off balance. Per guideline, advised patient to call 911. Caller verbalized understanding. Denies further questions.      Tomas Alvares RN  [facility]     COVID 19 Nurse Triage Plan/Patient Instructions    Please be aware that novel coronavirus (COVID-19) may be circulating in the community. If you develop symptoms such as fever, cough, or SOB or if you have concerns about the presence of another infection including coronavirus (COVID-19), please contact your health care provider or visit [email or web address].     Disposition/Instructions    Call to EMS/911 recommended. Follow protocol based instructions.     Bring Your Own Device:  Please also bring your smart device(s) (smart phones, tablets, laptops) and their charging cables for your personal use and to communicate with your care team during your visit.    Thank you for taking steps to prevent the spread of this virus.  o Limit your contact with others.  o Wear a simple mask to cover your cough.  o Wash your hands well and often.    Resources    [facility]: About COVID-19: [email or web address]    CDC: What to Do If You're Sick: www.cdc.gov/coronavirus/2019-ncov/about/steps-when-sick.html    CDC: Ending Home Isolation: www.cdc.gov/coronavirus/2019-ncov/hcp/disposition-in-home-patients.html     CDC: Caring for Someone: www.cdc.gov/coronavirus/2019-ncov/if-you-are-sick/care-for-someone.html     [facility]: Interim Guidance for Hospital Discharge to Home: www.health.state.[place]/diseases/coronavirus/hcp/hospdischarge.pdf    [facility] clinical trials (COVID-19 research studies): [email or web address]     Below are the COVID-19 hotlines at the [facility] of Health ([facility]). Interpreters are available.   o For health questions: Call 622-171-8848 or 1-151.388.6898  (7 a.m. to 7 p.m.)  o For questions about schools and childcare: Call 320-400-4436 or 1-933.314.3936 (7 a.m. to 7 p.m.)                       Reason for Disposition    Unable to walk, or can only walk with assistance (e.g., requires support)    Additional Information    Negative: Passed out (i.e., lost consciousness, collapsed and was not responding)    Negative: Shock suspected (e.g., cold/pale/clammy skin, too weak to stand, low BP, rapid pulse)    Negative: Difficult to awaken or acting confused (e.g., disoriented, slurred speech)    Negative: Visible sweat on face or sweat dripping down face    Protocols used: HEART RATE AND HEARTBEAT PNSHDNSRI-D-ON

## 2021-09-24 NOTE — TELEPHONE ENCOUNTER
Spoke with kehinde who states pt feeling better. SN visit today per [facility], HR 90, /60. Obtained wound culture.  Has F/U VV with Dr. Peters 9/29.  If any labs needed homecare can draw.  TONE Walton RN

## 2021-09-24 NOTE — TELEPHONE ENCOUNTER
Reason for Call:  Other FYI - ER Visit     Detailed comments: Pt was in ER [facility] 9/23/21 for Increased Heart Rate - 140-160 Gave pt Fluids & Labs. Troponin level slightly elevated  Home now doing good    Phone Number Patient can be reached at: Other phone number:  [phone]*      Best Time: Any Time      Can we leave a detailed message on this number? YES    Call taken on 9/24/2021 at 8:41 AM by Samantha Cardenas

## 2021-09-25 LAB — BACTERIA UR CULT: ABNORMAL

## 2021-09-27 ENCOUNTER — TELEPHONE (OUTPATIENT)
Dept: FAMILY MEDICINE | Facility: CLINIC | Age: 76
End: 2021-09-27
Payer: MEDICARE

## 2021-09-27 LAB
BACTERIA WND CULT: ABNORMAL
GRAM STAIN RESULT: ABNORMAL
GRAM STAIN RESULT: ABNORMAL

## 2021-09-27 NOTE — TELEPHONE ENCOUNTER
Reason for Call:  Lab Results     Detailed comments: Requesting wound Culture from Friday.    Phone Number Patient can be reached at: Other phone number:  [facility] [phone] *    Best Time: Any Time      Can we leave a detailed message on this number? YES    Call taken on 9/27/2021 at 11:36 AM by Samantha Cardenas

## 2021-09-27 NOTE — TELEPHONE ENCOUNTER
Saige is on Augmentin.  Discussed result with Cole Ross from [facility]   Petra Colbert RN

## 2021-09-29 ENCOUNTER — VIRTUAL VISIT (OUTPATIENT)
Dept: FAMILY MEDICINE | Facility: CLINIC | Age: 76
End: 2021-09-29
Payer: MEDICARE

## 2021-09-29 ENCOUNTER — TELEPHONE (OUTPATIENT)
Dept: FAMILY MEDICINE | Facility: CLINIC | Age: 76
End: 2021-09-29

## 2021-09-29 DIAGNOSIS — Z53.9 DIAGNOSIS NOT YET DEFINED: Primary | ICD-10-CM

## 2021-09-29 DIAGNOSIS — N30.00 ACUTE CYSTITIS WITHOUT HEMATURIA: ICD-10-CM

## 2021-09-29 DIAGNOSIS — J44.9 CHRONIC OBSTRUCTIVE PULMONARY DISEASE, UNSPECIFIED COPD TYPE (H): Primary | ICD-10-CM

## 2021-09-29 DIAGNOSIS — L08.9 LOCAL INFECTION OF WOUND: ICD-10-CM

## 2021-09-29 DIAGNOSIS — T14.8XXA LOCAL INFECTION OF WOUND: ICD-10-CM

## 2021-09-29 PROCEDURE — G0180 MD CERTIFICATION HHA PATIENT: HCPCS | Performed by: FAMILY MEDICINE

## 2021-09-29 PROCEDURE — 99214 OFFICE O/P EST MOD 30 MIN: CPT | Mod: 95 | Performed by: FAMILY MEDICINE

## 2021-09-29 NOTE — TELEPHONE ENCOUNTER
Called Vandana from [facility] [phone]  - left message per Dr. Peters to have a wound culture done on Hip Ulcer and a urine culture next week.     Left message for Vandana to call back to clarify.

## 2021-09-29 NOTE — PROGRESS NOTES
Saige is a 76 year old who is being evaluated via a billable video visit.      How would you like to obtain your AVS? MyChart  If the video visit is dropped, the invitation should be resent by: Text to cell phone: [phone]  Will anyone else be joining your video visit? No    Video Start Time: 340pm    Assessment & Plan     Chronic obstructive pulmonary disease, unspecified COPD type (H)  Stable no change in treatment plan.     Acute cystitis without hematuria  Treated with abx and sxs are better     Local infection of wound  Culture pending and started abx   Continue currnt dressing changes                    Return in about 1 week (around 10/6/2021).    Suzy Peters MD  [facility]    Subjective   Saige is a 76 year old who presents for the following health issues  accompanied by her grand daughter:    [facility]     ED/UC Followup:    Facility:  [facility] Emergency Department   Date of visit: 9/24/2021  Reason for visit: developed tachycardia after taking amoxicillin for a wound infection, anxious   Current Status: at night her heart rate is increasing more 115-130, sometimes more anxious, ativan isnt helping much      Thursday night  went to ER was given fluids and that helped   Had UTI   With fever and chills     Wound now seems to be infected per daughter with purulent drainage   homecare is involved     COPD is stable           Review of Systems   Constitutional, HEENT, cardiovascular, pulmonary, gi and gu systems are negative, except as otherwise noted.      Objective           Vitals:  No vitals were obtained today due to virtual visit.    Physical Exam   GENERAL: Healthy, alert and no distress  EYES: Eyes grossly normal to inspection.  No discharge or erythema, or obvious scleral/conjunctival abnormalities.  RESP: No audible wheeze, cough, or visible cyanosis.  No visible retractions or increased work of breathing.    SKIN: Visible skin clear. No significant  rash, abnormal pigmentation or lesions.  NEURO: Cranial nerves grossly intact.  Mentation and speech appropriate for age.  PSYCH: Mentation appears normal, affect normal/bright, judgement and insight intact, normal speech and appearance well-groomed.                Video-Visit Details    Type of service:  Video Visit    Video End Time:4pm    Originating Location (pt. Location): Home    Distant Location (provider location):  [facility]     Platform used for Video Visit: Jeannie

## 2021-10-03 DIAGNOSIS — F32.0 MAJOR DEPRESSIVE DISORDER, SINGLE EPISODE, MILD (H): ICD-10-CM

## 2021-10-03 DIAGNOSIS — K22.2 ESOPHAGEAL STENOSIS: ICD-10-CM

## 2021-10-04 ENCOUNTER — TELEPHONE (OUTPATIENT)
Dept: FAMILY MEDICINE | Facility: CLINIC | Age: 76
End: 2021-10-04
Payer: MEDICARE

## 2021-10-04 DIAGNOSIS — J44.9 CHRONIC OBSTRUCTIVE PULMONARY DISEASE, UNSPECIFIED COPD TYPE (H): Primary | ICD-10-CM

## 2021-10-04 DIAGNOSIS — F41.1 GENERALIZED ANXIETY DISORDER: ICD-10-CM

## 2021-10-04 RX ORDER — OMEPRAZOLE 40 MG/1
CAPSULE, DELAYED RELEASE ORAL
Qty: 90 CAPSULE | Refills: 1 | Status: SHIPPED | OUTPATIENT
Start: 2021-10-04 | End: 2022-01-01

## 2021-10-04 RX ORDER — LORAZEPAM 0.5 MG/1
0.5 TABLET ORAL EVERY 8 HOURS PRN
Qty: 90 TABLET | Refills: 3 | Status: SHIPPED | OUTPATIENT
Start: 2021-10-04 | End: 2022-01-01

## 2021-10-04 NOTE — TELEPHONE ENCOUNTER
Reason for Call:  Other prescription    Detailed comments: Pt's is taking Lorazepam 0.5 mg 3 times daily. The script that was sent to Pharmacy was 2 times daily     Phone Number Patient can be reached at: Other phone number:  [facility] [phone]  Pt is with Granddaughter *    Best Time: Any Time      Can we leave a detailed message on this number? YES    Call taken on 10/4/2021 at 10:32 AM by Samantha Cardenas

## 2021-10-04 NOTE — TELEPHONE ENCOUNTER
It should be three times per day that was my mistake  Please call pharmacy and see if I send a new one they will honor that

## 2021-10-05 ENCOUNTER — TELEPHONE (OUTPATIENT)
Dept: FAMILY MEDICINE | Facility: CLINIC | Age: 76
End: 2021-10-05

## 2021-10-05 DIAGNOSIS — J44.9 CHRONIC OBSTRUCTIVE PULMONARY DISEASE, UNSPECIFIED COPD TYPE (H): ICD-10-CM

## 2021-10-05 RX ORDER — ALBUTEROL SULFATE 0.83 MG/ML
SOLUTION RESPIRATORY (INHALATION)
Qty: 75 ML | Refills: 3 | Status: SHIPPED | OUTPATIENT
Start: 2021-10-05 | End: 2021-11-23

## 2021-10-05 NOTE — TELEPHONE ENCOUNTER
FYI~ A refill has been made to the  assistance program for Spiriva Handihaler, Ventolin HFA, Advair Diskus.    A 90 day supply of Spiriva Handihaler, Ventolin HFA, Advair Diskus   will be delivered to Saige's Home within 7-10 business days.    Thank you,    Cheryl Pierre  Prescription Assistance

## 2021-10-08 ENCOUNTER — OFFICE VISIT (OUTPATIENT)
Dept: FAMILY MEDICINE | Facility: CLINIC | Age: 76
End: 2021-10-08
Payer: MEDICARE

## 2021-10-08 ENCOUNTER — LAB REQUISITION (OUTPATIENT)
Dept: LAB | Facility: CLINIC | Age: 76
End: 2021-10-08
Payer: MEDICARE

## 2021-10-08 VITALS
SYSTOLIC BLOOD PRESSURE: 134 MMHG | TEMPERATURE: 97 F | OXYGEN SATURATION: 94 % | DIASTOLIC BLOOD PRESSURE: 78 MMHG | WEIGHT: 83 LBS | HEART RATE: 113 BPM | BODY MASS INDEX: 16.21 KG/M2

## 2021-10-08 DIAGNOSIS — R53.1 GENERALIZED WEAKNESS: ICD-10-CM

## 2021-10-08 DIAGNOSIS — F41.1 GENERALIZED ANXIETY DISORDER: ICD-10-CM

## 2021-10-08 DIAGNOSIS — Z23 HIGH PRIORITY FOR 2019-NCOV VACCINE: ICD-10-CM

## 2021-10-08 DIAGNOSIS — N39.0 URINARY TRACT INFECTION, SITE NOT SPECIFIED: ICD-10-CM

## 2021-10-08 DIAGNOSIS — S91.309A UNSPECIFIED OPEN WOUND, UNSPECIFIED FOOT, INITIAL ENCOUNTER: ICD-10-CM

## 2021-10-08 DIAGNOSIS — J44.9 CHRONIC OBSTRUCTIVE PULMONARY DISEASE, UNSPECIFIED COPD TYPE (H): ICD-10-CM

## 2021-10-08 DIAGNOSIS — Z23 NEED FOR PROPHYLACTIC VACCINATION AND INOCULATION AGAINST INFLUENZA: Primary | ICD-10-CM

## 2021-10-08 LAB
ALBUMIN UR-MCNC: 30 MG/DL
APPEARANCE UR: ABNORMAL
BACTERIA #/AREA URNS HPF: ABNORMAL /HPF
BILIRUB UR QL STRIP: NEGATIVE
CAOX CRY #/AREA URNS HPF: ABNORMAL /HPF
COLOR UR AUTO: YELLOW
GLUCOSE UR STRIP-MCNC: NEGATIVE MG/DL
HGB UR QL STRIP: ABNORMAL
HYALINE CASTS: 1 /LPF
KETONES UR STRIP-MCNC: NEGATIVE MG/DL
LEUKOCYTE ESTERASE UR QL STRIP: NEGATIVE
MUCOUS THREADS #/AREA URNS LPF: PRESENT /LPF
NITRATE UR QL: NEGATIVE
PH UR STRIP: 5 [PH] (ref 5–7)
RBC URINE: 16 /HPF
SP GR UR STRIP: 1.03 (ref 1–1.03)
SQUAMOUS EPITHELIAL: <1 /HPF
TRANSITIONAL EPI: <1 /HPF
UROBILINOGEN UR STRIP-MCNC: NORMAL MG/DL
WBC URINE: 7 /HPF

## 2021-10-08 PROCEDURE — G0008 ADMIN INFLUENZA VIRUS VAC: HCPCS | Performed by: FAMILY MEDICINE

## 2021-10-08 PROCEDURE — 90662 IIV NO PRSV INCREASED AG IM: CPT | Performed by: FAMILY MEDICINE

## 2021-10-08 PROCEDURE — 87070 CULTURE OTHR SPECIMN AEROBIC: CPT | Mod: ORL | Performed by: FAMILY MEDICINE

## 2021-10-08 PROCEDURE — 81001 URINALYSIS AUTO W/SCOPE: CPT | Mod: ORL | Performed by: FAMILY MEDICINE

## 2021-10-08 PROCEDURE — 0013A COVID-19,PF,MODERNA (18+ YRS): CPT | Performed by: FAMILY MEDICINE

## 2021-10-08 PROCEDURE — 91301 COVID-19,PF,MODERNA (18+ YRS): CPT | Performed by: FAMILY MEDICINE

## 2021-10-08 PROCEDURE — 99214 OFFICE O/P EST MOD 30 MIN: CPT | Mod: 25 | Performed by: FAMILY MEDICINE

## 2021-10-08 NOTE — PROGRESS NOTES
Assessment & Plan     Chronic obstructive pulmonary disease, unspecified COPD type (H)  Stable no change in treatment plan.     Generalized anxiety disorder  Stable no change in treatment plan.   Lorazepam tid     Generalized weakness  Will get PT involved again     Need for prophylactic vaccination and inoculation against influenza    - INFLUENZA, QUAD, HIGH DOSE, PF, 65YR + (FLUZONE HD)    High priority for 2019-nCoV vaccine    - COVID-19,PF,MODERNA                 Return in about 1 month (around 11/8/2021) for using a video visit, with me.    Suzy Peters MD  [facility]    Nia Moulton is a 76 year old who presents for the following health issues  accompanied by her grand daughters:    HPI     COPD Follow-Up    Overall, how are your COPD symptoms since your last clinic visit?  No change    How much fatigue or shortness of breath do you have when you are walking?  Same as usual    How much shortness of breath do you have when you are resting?  Same as usual    How often do you cough? Rarely    Have you noticed any change in your sputum/phlegm?  No    Have you experienced a recent fever? No    Please describe how far you can walk without stopping to rest:  Less than 10 feet    How many flights of stairs are you able to walk up without stopping?  None    Have you had any Emergency Room Visits, Urgent Care Visits, or Hospital Admissions because of your COPD since your last office visit?  No    History   Smoking Status     Former Smoker     Packs/day: 4.00     Years: 36.00     Types: Cigarettes     Quit date: 12/16/1993   Smokeless Tobacco     Never Used     Comment: Started at age 12     No results found for: FEV1, QAS3BNA    Wound on left hip is almost healed   Weight is improving     Getting stronger   PT discharged her but no maintenance exercises   Her appetite is good   Bowels moving     Sleep it good       Anxiety Follow-Up    How are you doing with your anxiety  since your last visit? No change    Are you having other symptoms that might be associated with anxiety? No    Have you had a significant life event? No     Are you feeling depressed? No    Do you have any concerns with your use of alcohol or other drugs? No    Social History     Tobacco Use     Smoking status: Former Smoker     Packs/day: 4.00     Years: 36.00     Pack years: 144.00     Types: Cigarettes     Quit date: 1993     Years since quittin.8     Smokeless tobacco: Never Used     Tobacco comment: Started at age 12   Vaping Use     Vaping Use: Never used   Substance Use Topics     Alcohol use: No     Drug use: No     ROE-7 SCORE 3/24/2021 2021 2021   Total Score - - -   Total Score - - -   Total Score 19 14 7     PHQ 3/24/2021 2021 2021   PHQ-9 Total Score 16 18 4   Q9: Thoughts of better off dead/self-harm past 2 weeks Several days Not at all Not at all         Ativan regimen is working well       Review of Systems   Constitutional, HEENT, cardiovascular, pulmonary, gi and gu systems are negative, except as otherwise noted.      Objective    /78 (BP Location: Right arm, Patient Position: Chair, Cuff Size: Adult Regular)   Pulse 113   Temp 97  F (36.1  C)   Wt 37.6 kg (83 lb)   LMP  (LMP Unknown)   SpO2 94%   Breastfeeding No   BMI 16.21 kg/m    Body mass index is 16.21 kg/m .  Physical Exam   GENERAL APPEARANCE: healthy, alert and no distress  RESP: lungs clear to auscultation - no rales, rhonchi or wheezes  CV: regular rates and rhythm, normal S1 S2, no S3 or S4 and no murmur, click or rub  MS: extremities normal- no gross deformities noted  PSYCH: mentation appears normal and affect normal/bright

## 2021-10-11 LAB — BACTERIA WND CULT: ABNORMAL

## 2021-10-12 ENCOUNTER — LAB (OUTPATIENT)
Dept: LAB | Facility: CLINIC | Age: 76
End: 2021-10-12
Payer: MEDICARE

## 2021-10-12 DIAGNOSIS — N39.0 URINARY TRACT INFECTION, SITE NOT SPECIFIED: Primary | ICD-10-CM

## 2021-10-12 PROCEDURE — 87088 URINE BACTERIA CULTURE: CPT

## 2021-10-12 PROCEDURE — 87186 SC STD MICRODIL/AGAR DIL: CPT

## 2021-10-12 PROCEDURE — 87086 URINE CULTURE/COLONY COUNT: CPT

## 2021-10-15 ENCOUNTER — TELEPHONE (OUTPATIENT)
Dept: FAMILY MEDICINE | Facility: CLINIC | Age: 76
End: 2021-10-15
Payer: MEDICARE

## 2021-10-15 DIAGNOSIS — N30.00 ACUTE CYSTITIS WITHOUT HEMATURIA: Primary | ICD-10-CM

## 2021-10-15 LAB — BACTERIA UR CULT: ABNORMAL

## 2021-10-15 RX ORDER — NITROFURANTOIN 25; 75 MG/1; MG/1
100 CAPSULE ORAL 2 TIMES DAILY
Qty: 14 CAPSULE | Refills: 0 | Status: SHIPPED | OUTPATIENT
Start: 2021-10-15 | End: 2022-01-01

## 2021-10-15 NOTE — TELEPHONE ENCOUNTER
[facility] - Plan of Care   Form placed on Provider Dr. Peters desjenae for Signature.  Samantha Orn [facility] Sec

## 2021-10-15 NOTE — TELEPHONE ENCOUNTER
Reason for Call:  Other prescription    Detailed comments: antibiotic for Positive Culture.  Granddaughter Benton called notified and understood.  RX for Antibiotic was not called in.  Please Send RX    Phone Number Patient can be reached at: Home number on file [phone] (home)    Best Time: Any Time      Can we leave a detailed message on this number? YES    Call taken on 10/15/2021 at 11:20 AM by Samantha Cardenas

## 2021-10-18 ENCOUNTER — TELEPHONE (OUTPATIENT)
Dept: FAMILY MEDICINE | Facility: CLINIC | Age: 76
End: 2021-10-18

## 2021-10-18 ENCOUNTER — APPOINTMENT (OUTPATIENT)
Dept: FAMILY MEDICINE | Facility: CLINIC | Age: 76
End: 2021-10-18
Payer: MEDICARE

## 2021-10-18 DIAGNOSIS — N30.00 ACUTE CYSTITIS WITHOUT HEMATURIA: Primary | ICD-10-CM

## 2021-10-18 RX ORDER — NITROFURANTOIN 25 MG/5ML
50 SUSPENSION ORAL 4 TIMES DAILY
Qty: 200 ML | Refills: 0 | Status: SHIPPED | OUTPATIENT
Start: 2021-10-18 | End: 2021-10-23

## 2021-10-18 NOTE — TELEPHONE ENCOUNTER
[place] notified. She isn't sure she will be able to make it before the clinic closes tonight. If not, appt scheduled with nurse only for injection.   Mandy MCCOY RN

## 2021-10-18 NOTE — TELEPHONE ENCOUNTER
She is allergic to bactrim   We could dry doxy that is a small capsule or she can come to the clinic for 1 gm ofancef injection   Those are the only options other than waiting and repeating the UC to see if she clears this with the 2-3 doses of macrobid she has had

## 2021-10-18 NOTE — TELEPHONE ENCOUNTER
Kelly called stating nitrofurantoin suspension too expensive, $400. Would like different Rx sent to [facility] in [place] please.  Mandy MCCOY RN

## 2021-10-18 NOTE — TELEPHONE ENCOUNTER
[place]'s mian says last Friday she was given antibiotic nitroFURantoin macrocrystal-monohydrate (MACROBID) 100 MG capsule. Sig - Route: Take 1 capsule (100 mg) by mouth 2 times daily - Oral    She is having trouble swallowing them. She is asking if she could get this in a liquid form.     [facility] in [place], [place]

## 2021-10-18 NOTE — TELEPHONE ENCOUNTER
Spoke with patient and mian. Patient has only gotten 2 doses down with difficulty. Pharmacist told them they could open up the capsule but patient still chokes on the powder and the pill that is inside the capsule.   [facility] [place] [facility]. Please send if ok. RN to notify patient.  Mandy MCCOY RN

## 2021-10-19 ENCOUNTER — ALLIED HEALTH/NURSE VISIT (OUTPATIENT)
Dept: FAMILY MEDICINE | Facility: CLINIC | Age: 76
End: 2021-10-19
Payer: MEDICARE

## 2021-10-19 DIAGNOSIS — N39.0 UTI (URINARY TRACT INFECTION): Primary | ICD-10-CM

## 2021-10-19 PROCEDURE — 96372 THER/PROPH/DIAG INJ SC/IM: CPT | Performed by: FAMILY MEDICINE

## 2021-10-19 PROCEDURE — 99207 PR NO CHARGE NURSE ONLY: CPT

## 2021-10-19 RX ORDER — CEFAZOLIN SODIUM 1 G
1 VIAL (EA) INJECTION ONCE
Status: COMPLETED | OUTPATIENT
Start: 2021-10-19 | End: 2021-10-19

## 2021-10-19 RX ADMIN — Medication 1 G: at 15:00

## 2021-10-19 NOTE — PROGRESS NOTES
Clinic Administered Medication Documentation    Administrations This Visit     ceFAZolin (ANCEF) injection 1 g     Admin Date  10/19/2021 Action  Given Dose  1 g Route  Intramuscular Site  Right Gluteus Cliff Administered By  Mandy Honeycutt RN    Ordering Provider: Suzy Peters MD    Patient Supplied?: No                  Injectable Medication Documentation    Patient was given cefazolin. Prior to medication administration, verified patients identity using patient s name and date of birth. Please see MAR and medication order for additional information. Patient instructed to remain in clinic for 15 minutes.      Was entire vial of medication used? Yes  Vial/Syringe: Single dose vial  Expiration Date:  11/22  Was this medication supplied by the patient? No   Mandy MCCOY RN

## 2021-11-05 ENCOUNTER — MEDICAL CORRESPONDENCE (OUTPATIENT)
Dept: HEALTH INFORMATION MANAGEMENT | Facility: CLINIC | Age: 76
End: 2021-11-05
Payer: MEDICARE

## 2021-11-08 ENCOUNTER — TELEPHONE (OUTPATIENT)
Dept: FAMILY MEDICINE | Facility: CLINIC | Age: 76
End: 2021-11-08
Payer: MEDICARE

## 2021-11-11 ENCOUNTER — TELEPHONE (OUTPATIENT)
Dept: FAMILY MEDICINE | Facility: CLINIC | Age: 76
End: 2021-11-11
Payer: MEDICARE

## 2021-11-11 DIAGNOSIS — N39.0 UTI (URINARY TRACT INFECTION): Primary | ICD-10-CM

## 2021-11-11 NOTE — TELEPHONE ENCOUNTER
Reason for Call: Lab Order    Detailed comments: Pt was told to Recheck her Urine in two weeks from 10/8/21. Should she have this done again?  If yes please place order for the Urine.   Grand daughter is going to take it to the  Clinic in [place].    Phone Number Patient can be reached at: Other phone number:  [phone] [facility] *        Call taken on 11/11/2021 at 12:43 PM by Samantha Cardenas

## 2021-11-11 NOTE — TELEPHONE ENCOUNTER
Grand daughter returned call notified and understood  Samantha [facility] Station Sec

## 2021-11-11 NOTE — TELEPHONE ENCOUNTER
Left message, Order placed    10/15/2021  8:17 AM CDT         Please call [facility]/macario and let them know UC is pos for small numbers of bacteria   I would like to treat her with macrobid 100mg bid for 7 days and repeat UC in 3 weeks

## 2021-11-15 ENCOUNTER — LAB (OUTPATIENT)
Dept: LAB | Facility: CLINIC | Age: 76
End: 2021-11-15
Payer: MEDICARE

## 2021-11-15 ENCOUNTER — MEDICAL CORRESPONDENCE (OUTPATIENT)
Dept: HEALTH INFORMATION MANAGEMENT | Facility: CLINIC | Age: 76
End: 2021-11-15

## 2021-11-15 DIAGNOSIS — N39.0 UTI (URINARY TRACT INFECTION): ICD-10-CM

## 2021-11-15 PROCEDURE — 87086 URINE CULTURE/COLONY COUNT: CPT

## 2021-11-17 LAB — BACTERIA UR CULT: NORMAL

## 2021-11-23 DIAGNOSIS — J44.9 CHRONIC OBSTRUCTIVE PULMONARY DISEASE, UNSPECIFIED COPD TYPE (H): ICD-10-CM

## 2021-11-23 NOTE — TELEPHONE ENCOUNTER
Reason for Call:  Prescription Albuterol Nebulitzer and Zofran  Detailed comments:   Pharmacy faxed request for   1) Albuterol Nebulizer requesting more then one Box  2) requesting Zofran new to current med list.       Call taken on 11/23/2021 at 10:21 AM by Samantha Cardenas

## 2021-11-24 RX ORDER — ALBUTEROL SULFATE 0.83 MG/ML
SOLUTION RESPIRATORY (INHALATION)
Qty: 75 ML | Refills: 11 | Status: SHIPPED | OUTPATIENT
Start: 2021-11-24 | End: 2022-01-01

## 2021-12-03 ENCOUNTER — TELEPHONE (OUTPATIENT)
Dept: FAMILY MEDICINE | Facility: CLINIC | Age: 76
End: 2021-12-03
Payer: MEDICARE

## 2021-12-03 DIAGNOSIS — J44.1 COPD EXACERBATION (H): Primary | ICD-10-CM

## 2021-12-03 RX ORDER — AZITHROMYCIN 250 MG/1
TABLET, FILM COATED ORAL
Qty: 6 TABLET | Refills: 0 | Status: SHIPPED | OUTPATIENT
Start: 2021-12-03 | End: 2022-01-24

## 2021-12-03 RX ORDER — METHYLPREDNISOLONE 4 MG
TABLET, DOSE PACK ORAL
Qty: 21 TABLET | Refills: 0 | Status: SHIPPED | OUTPATIENT
Start: 2021-12-03 | End: 2022-01-01

## 2021-12-03 NOTE — TELEPHONE ENCOUNTER
Reason for call:  Patient reporting a symptom    Symptom or request: Patients granddaughter is calling saying that the patient has a URI her nose is plugged but not congested. She has a dry cough with green fleam.   Any recommendations  over the counter and or what to look for if need to bring her in.   Duration (how long have symptoms been present): 2-3 days    Have you been treated for this before? No    Phone Number patient can be reached at:  Home number on file [phone] (home)    Best Time:  any    Can we leave a detailed message on this number:  YES    Call taken on 12/3/2021 at 12:18 PM by Lisa Torres

## 2021-12-03 NOTE — TELEPHONE ENCOUNTER
S-(situation): Pt gives verbal permission to speak with [facility] jean. Reports cough, sinus sx x1 wk.     B-(background): Hx COPD. Taking tyl BID, using albuterol neb and inhaler as directed.    A-(assessment): Reports onset cough, nasal congestion x1 wk. Cough dry. Denies acute resp distress, increased SOA. Greenish odorous nasal discharge. Denies PND, sinus presure/ pain, ear pain/ fullness. No fevers, chills. No loss of taste/ smell.     R-(recommendations):Advise UC for sx eval. Currently living in [place] with kehinde pt refusing UC.  Requesting VV with Dr. Peters next week however lives in [place]. Advise OTC saline nasal spray, flonase.  Forwarded to Dr. Peters for review, recommendations.  TONE Walton RN

## 2021-12-13 ENCOUNTER — TELEPHONE (OUTPATIENT)
Dept: FAMILY MEDICINE | Facility: CLINIC | Age: 76
End: 2021-12-13
Payer: MEDICARE

## 2021-12-13 DIAGNOSIS — J44.1 COPD EXACERBATION (H): Primary | ICD-10-CM

## 2021-12-13 RX ORDER — DOXYCYCLINE 100 MG/1
100 CAPSULE ORAL 2 TIMES DAILY
Qty: 20 CAPSULE | Refills: 0 | Status: SHIPPED | OUTPATIENT
Start: 2021-12-13 | End: 2022-01-01

## 2021-12-13 RX ORDER — METHYLPREDNISOLONE 4 MG
TABLET, DOSE PACK ORAL
Qty: 21 TABLET | Refills: 0 | Status: SHIPPED | OUTPATIENT
Start: 2021-12-13 | End: 2022-01-24

## 2021-12-13 NOTE — TELEPHONE ENCOUNTER
Please call [facility]  Given that the antibiotic was just 10 days ago I am wondering if she got better and now worse  Do we just need prednisone burst perhaps?  Otherwise I would consider switching abx   Has Benton listened to her lungs is she wheezey?

## 2021-12-13 NOTE — TELEPHONE ENCOUNTER
Reason for call:  Patient reporting a symptom    Symptom or request: Pt's granddaughter says Dr Peters  Had prescribed Zithromax for Saige 12/3. Today she woke up with a hacky productive cough and chills but no fever. She says Dr Peters usually treats her so she doesn't get worse with her COPD. She is still staying with her granddaughter in [place], [place].   Have you been treated for this before? Yes    Additional comments: [facility]    Phone Number patient can be reached at:   [phone]    Best Time:  anytime    Can we leave a detailed message on this number:  YES    Call taken on 12/13/2021 at 8:01 AM by Patrica King

## 2021-12-13 NOTE — TELEPHONE ENCOUNTER
I talked with Benton.  Benton says that Saige's cough is raspy,productive and deep.  Sputum is green when coughs.    Lungs don't sound wet or crackly.  Lungs sound wheezy bilateral.  Cough worse.  Petra Colbert RN

## 2021-12-20 ENCOUNTER — TELEPHONE (OUTPATIENT)
Dept: FAMILY MEDICINE | Facility: CLINIC | Age: 76
End: 2021-12-20
Payer: MEDICARE

## 2021-12-20 DIAGNOSIS — K22.2 ESOPHAGEAL STENOSIS: Primary | ICD-10-CM

## 2021-12-20 DIAGNOSIS — R11.0 NAUSEA: ICD-10-CM

## 2021-12-20 RX ORDER — ONDANSETRON 4 MG/1
4 TABLET, ORALLY DISINTEGRATING ORAL EVERY 6 HOURS PRN
Qty: 20 TABLET | Refills: 1 | Status: SHIPPED | OUTPATIENT
Start: 2021-12-20 | End: 2021-12-22

## 2021-12-20 NOTE — TELEPHONE ENCOUNTER
Pharmacy requesting refill: ZOFRAN ODT 4 MG TAB  QUANTITY: 10  SIG: DISSOLVE 1 TABLET IN MOUTH EVERY 6 HOURS AS NEEDED      ORIGINALLY PRESCRIBED BY BELLE MARI      THIS IS NOT ON HER MED LIST.

## 2021-12-22 ENCOUNTER — TELEPHONE (OUTPATIENT)
Dept: FAMILY MEDICINE | Facility: CLINIC | Age: 76
End: 2021-12-22
Payer: MEDICARE

## 2021-12-22 DIAGNOSIS — R11.0 NAUSEA: ICD-10-CM

## 2021-12-22 DIAGNOSIS — K22.2 ESOPHAGEAL STENOSIS: ICD-10-CM

## 2021-12-22 RX ORDER — ONDANSETRON 4 MG/1
4-8 TABLET, ORALLY DISINTEGRATING ORAL EVERY 6 HOURS PRN
Qty: 20 TABLET | Refills: 1 | Status: SHIPPED | OUTPATIENT
Start: 2021-12-22

## 2021-12-22 NOTE — TELEPHONE ENCOUNTER
She can try 4-8 mg of the Zofran.  She needs to drink, if she is unable to take in fluids she needs to present to the emergency department for further evaluation.      MARYJANE Vázquez CNP

## 2021-12-22 NOTE — TELEPHONE ENCOUNTER
FYI~ A refill request has been made to the  assistance programs for Spiriva Handihaler, Advair diskus & Ventolin HFA.    A 90 day supply of SPIRIVA HANDIHALER, ADVAIR DISKUS & VENTOLIN HFA will be delivered to Saige's home within 7-10 business days.    This is Saige's final fill for Ventolin HFA, [employer] has discontinued the program.    Noy Pickens  Prescription   Pharmacy Assistance  [phone]

## 2021-12-22 NOTE — TELEPHONE ENCOUNTER
Reason for call:  Patient reporting a symptom    Symptom or request: Pt's granddaughter, Benton called.  Saige is currently on Cephalexin for a respiratory illness due to her COPD. The last 2 days she is extremely nauseated. She held all her meds last night because she just wasn't feeling well. She has Zofran and it helps a little. She says this is like motion sickness. She feels sick when she gets up and moves around but OK when she is just laying in bed. This has decreased her fluids and appetite. She is asking for any advise for what she can do for her.       Phone Number patient can be reached at:  [place]  [phone]    Best Time:  anytime    Can we leave a detailed message on this number:  YES    Call taken on 12/22/2021 at 9:12 AM by Patrica King

## 2022-01-01 ENCOUNTER — OFFICE VISIT (OUTPATIENT)
Dept: FAMILY MEDICINE | Facility: CLINIC | Age: 77
End: 2022-01-01
Payer: MEDICARE

## 2022-01-01 ENCOUNTER — TELEPHONE (OUTPATIENT)
Dept: FAMILY MEDICINE | Facility: CLINIC | Age: 77
End: 2022-01-01
Payer: MEDICARE

## 2022-01-01 ENCOUNTER — VIRTUAL VISIT (OUTPATIENT)
Dept: FAMILY MEDICINE | Facility: CLINIC | Age: 77
End: 2022-01-01
Payer: MEDICARE

## 2022-01-01 ENCOUNTER — TELEPHONE (OUTPATIENT)
Dept: FAMILY MEDICINE | Facility: CLINIC | Age: 77
End: 2022-01-01

## 2022-01-01 ENCOUNTER — HOSPITAL ENCOUNTER (EMERGENCY)
Facility: CLINIC | Age: 77
Discharge: HOME | End: 2022-07-22
Attending: EMERGENCY MEDICINE | Admitting: EMERGENCY MEDICINE
Payer: MEDICARE

## 2022-01-01 ENCOUNTER — LAB (OUTPATIENT)
Dept: LAB | Facility: CLINIC | Age: 77
End: 2022-01-01
Payer: MEDICARE

## 2022-01-01 ENCOUNTER — APPOINTMENT (OUTPATIENT)
Dept: URGENT CARE | Facility: URGENT CARE | Age: 77
End: 2022-01-01
Payer: MEDICARE

## 2022-01-01 ENCOUNTER — APPOINTMENT (OUTPATIENT)
Dept: GENERAL RADIOLOGY | Facility: CLINIC | Age: 77
End: 2022-01-01
Attending: EMERGENCY MEDICINE
Payer: MEDICARE

## 2022-01-01 VITALS
WEIGHT: 81 LBS | HEART RATE: 90 BPM | SYSTOLIC BLOOD PRESSURE: 136 MMHG | DIASTOLIC BLOOD PRESSURE: 86 MMHG | OXYGEN SATURATION: 95 % | BODY MASS INDEX: 15.82 KG/M2 | TEMPERATURE: 98.5 F | RESPIRATION RATE: 24 BRPM

## 2022-01-01 VITALS
WEIGHT: 83 LBS | OXYGEN SATURATION: 97 % | DIASTOLIC BLOOD PRESSURE: 76 MMHG | BODY MASS INDEX: 16.3 KG/M2 | SYSTOLIC BLOOD PRESSURE: 157 MMHG | HEART RATE: 110 BPM | RESPIRATION RATE: 20 BRPM | HEIGHT: 60 IN | TEMPERATURE: 97 F

## 2022-01-01 VITALS
TEMPERATURE: 97 F | RESPIRATION RATE: 16 BRPM | OXYGEN SATURATION: 98 % | WEIGHT: 68.8 LBS | HEART RATE: 113 BPM | SYSTOLIC BLOOD PRESSURE: 138 MMHG | DIASTOLIC BLOOD PRESSURE: 82 MMHG | BODY MASS INDEX: 13.44 KG/M2

## 2022-01-01 DIAGNOSIS — K22.2 ESOPHAGEAL STENOSIS: Primary | ICD-10-CM

## 2022-01-01 DIAGNOSIS — E43 UNSPECIFIED SEVERE PROTEIN-CALORIE MALNUTRITION (H): ICD-10-CM

## 2022-01-01 DIAGNOSIS — J44.9 CHRONIC OBSTRUCTIVE PULMONARY DISEASE, UNSPECIFIED COPD TYPE (H): ICD-10-CM

## 2022-01-01 DIAGNOSIS — K22.2 ESOPHAGEAL STRICTURE: ICD-10-CM

## 2022-01-01 DIAGNOSIS — K22.2 ESOPHAGEAL STENOSIS: ICD-10-CM

## 2022-01-01 DIAGNOSIS — J44.9 CHRONIC OBSTRUCTIVE PULMONARY DISEASE, UNSPECIFIED COPD TYPE (H): Primary | ICD-10-CM

## 2022-01-01 DIAGNOSIS — B37.0 THRUSH: ICD-10-CM

## 2022-01-01 DIAGNOSIS — F32.0 MAJOR DEPRESSIVE DISORDER, SINGLE EPISODE, MILD (H): ICD-10-CM

## 2022-01-01 DIAGNOSIS — F41.1 GENERALIZED ANXIETY DISORDER: ICD-10-CM

## 2022-01-01 DIAGNOSIS — N18.30 STAGE 3 CHRONIC KIDNEY DISEASE, UNSPECIFIED WHETHER STAGE 3A OR 3B CKD (H): ICD-10-CM

## 2022-01-01 DIAGNOSIS — J44.1 COPD EXACERBATION (H): ICD-10-CM

## 2022-01-01 DIAGNOSIS — R30.0 DYSURIA: ICD-10-CM

## 2022-01-01 DIAGNOSIS — E78.5 HYPERLIPIDEMIA LDL GOAL <130: ICD-10-CM

## 2022-01-01 DIAGNOSIS — E87.6 LOW POTASSIUM SYNDROME: Primary | ICD-10-CM

## 2022-01-01 DIAGNOSIS — Z01.818 PREOP GENERAL PHYSICAL EXAM: Primary | ICD-10-CM

## 2022-01-01 DIAGNOSIS — Z23 HIGH PRIORITY FOR 2019-NCOV VACCINE: ICD-10-CM

## 2022-01-01 DIAGNOSIS — G89.29 CHRONIC BILATERAL LOW BACK PAIN WITHOUT SCIATICA: Primary | ICD-10-CM

## 2022-01-01 DIAGNOSIS — Z23 NEED FOR PROPHYLACTIC VACCINATION AND INOCULATION AGAINST INFLUENZA: ICD-10-CM

## 2022-01-01 DIAGNOSIS — T18.108A ESOPHAGEAL FOREIGN BODY, INITIAL ENCOUNTER: ICD-10-CM

## 2022-01-01 DIAGNOSIS — M54.50 CHRONIC BILATERAL LOW BACK PAIN WITHOUT SCIATICA: Primary | ICD-10-CM

## 2022-01-01 DIAGNOSIS — E87.6 LOW BLOOD POTASSIUM: ICD-10-CM

## 2022-01-01 DIAGNOSIS — J44.1 COPD EXACERBATION (H): Primary | ICD-10-CM

## 2022-01-01 DIAGNOSIS — R05.9 COUGH: Primary | ICD-10-CM

## 2022-01-01 LAB
ALBUMIN UR-MCNC: NEGATIVE MG/DL
ANION GAP SERPL CALCULATED.3IONS-SCNC: 9 MMOL/L (ref 7–15)
APPEARANCE UR: CLEAR
BILIRUB UR QL STRIP: NEGATIVE
BUN SERPL-MCNC: 12 MG/DL (ref 8–23)
CALCIUM SERPL-MCNC: 8.7 MG/DL (ref 8.8–10.2)
CHLORIDE SERPL-SCNC: 99 MMOL/L (ref 98–107)
COLOR UR AUTO: YELLOW
CREAT SERPL-MCNC: 0.71 MG/DL (ref 0.51–0.95)
DEPRECATED HCO3 PLAS-SCNC: 33 MMOL/L (ref 22–29)
ERYTHROCYTE [DISTWIDTH] IN BLOOD BY AUTOMATED COUNT: 13.7 % (ref 10–15)
GFR SERPL CREATININE-BSD FRML MDRD: 88 ML/MIN/1.73M2
GLUCOSE SERPL-MCNC: 99 MG/DL (ref 70–99)
GLUCOSE UR STRIP-MCNC: NEGATIVE MG/DL
HCT VFR BLD AUTO: 37.6 % (ref 35–47)
HGB BLD-MCNC: 11.2 G/DL (ref 11.7–15.7)
HGB UR QL STRIP: ABNORMAL
KETONES UR STRIP-MCNC: NEGATIVE MG/DL
LEUKOCYTE ESTERASE UR QL STRIP: NEGATIVE
MCH RBC QN AUTO: 26.7 PG (ref 26.5–33)
MCHC RBC AUTO-ENTMCNC: 29.8 G/DL (ref 31.5–36.5)
MCV RBC AUTO: 90 FL (ref 78–100)
NITRATE UR QL: NEGATIVE
PH UR STRIP: 7.5 [PH] (ref 5–7)
PLATELET # BLD AUTO: 466 10E3/UL (ref 150–450)
POTASSIUM SERPL-SCNC: 3.2 MMOL/L (ref 3.4–5.3)
RBC # BLD AUTO: 4.19 10E6/UL (ref 3.8–5.2)
RBC #/AREA URNS AUTO: NORMAL /HPF
SODIUM SERPL-SCNC: 141 MMOL/L (ref 136–145)
SP GR UR STRIP: 1.01 (ref 1–1.03)
UROBILINOGEN UR STRIP-ACNC: 0.2 E.U./DL
WBC # BLD AUTO: 13.5 10E3/UL (ref 4–11)
WBC #/AREA URNS AUTO: NORMAL /HPF

## 2022-01-01 PROCEDURE — 36415 COLL VENOUS BLD VENIPUNCTURE: CPT | Performed by: FAMILY MEDICINE

## 2022-01-01 PROCEDURE — 0134A COVID-19,PF,MODERNA BIVALENT: CPT | Performed by: FAMILY MEDICINE

## 2022-01-01 PROCEDURE — G0008 ADMIN INFLUENZA VIRUS VAC: HCPCS | Performed by: FAMILY MEDICINE

## 2022-01-01 PROCEDURE — 81001 URINALYSIS AUTO W/SCOPE: CPT

## 2022-01-01 PROCEDURE — 71045 X-RAY EXAM CHEST 1 VIEW: CPT

## 2022-01-01 PROCEDURE — 91313 COVID-19,PF,MODERNA BIVALENT: CPT | Performed by: FAMILY MEDICINE

## 2022-01-01 PROCEDURE — 99283 EMERGENCY DEPT VISIT LOW MDM: CPT | Performed by: EMERGENCY MEDICINE

## 2022-01-01 PROCEDURE — 99214 OFFICE O/P EST MOD 30 MIN: CPT | Performed by: FAMILY MEDICINE

## 2022-01-01 PROCEDURE — 90662 IIV NO PRSV INCREASED AG IM: CPT | Performed by: FAMILY MEDICINE

## 2022-01-01 PROCEDURE — 85027 COMPLETE CBC AUTOMATED: CPT | Performed by: FAMILY MEDICINE

## 2022-01-01 PROCEDURE — 99282 EMERGENCY DEPT VISIT SF MDM: CPT | Performed by: EMERGENCY MEDICINE

## 2022-01-01 PROCEDURE — 99214 OFFICE O/P EST MOD 30 MIN: CPT | Mod: 95 | Performed by: FAMILY MEDICINE

## 2022-01-01 PROCEDURE — 80048 BASIC METABOLIC PNL TOTAL CA: CPT | Performed by: FAMILY MEDICINE

## 2022-01-01 PROCEDURE — 99214 OFFICE O/P EST MOD 30 MIN: CPT | Mod: 25 | Performed by: FAMILY MEDICINE

## 2022-01-01 RX ORDER — LORAZEPAM 0.5 MG/1
TABLET ORAL
Qty: 90 TABLET | Refills: 0 | Status: SHIPPED | OUTPATIENT
Start: 2022-01-01 | End: 2022-01-01

## 2022-01-01 RX ORDER — AZITHROMYCIN 250 MG/1
TABLET, FILM COATED ORAL
Qty: 6 TABLET | Refills: 0 | Status: SHIPPED | OUTPATIENT
Start: 2022-01-01 | End: 2022-01-01

## 2022-01-01 RX ORDER — OMEPRAZOLE 40 MG/1
CAPSULE, DELAYED RELEASE ORAL
Qty: 90 CAPSULE | Refills: 0 | Status: SHIPPED | OUTPATIENT
Start: 2022-01-01 | End: 2022-01-01

## 2022-01-01 RX ORDER — NYSTATIN 100000/ML
SUSPENSION, ORAL (FINAL DOSE FORM) ORAL
Qty: 100 ML | Refills: 3 | Status: SHIPPED | OUTPATIENT
Start: 2022-01-01

## 2022-01-01 RX ORDER — NYSTATIN 100000/ML
SUSPENSION, ORAL (FINAL DOSE FORM) ORAL
Qty: 100 ML | Refills: 3 | OUTPATIENT
Start: 2022-01-01

## 2022-01-01 RX ORDER — BUPROPION HYDROCHLORIDE 75 MG/1
TABLET ORAL
Qty: 360 TABLET | Refills: 1 | Status: SHIPPED | OUTPATIENT
Start: 2022-01-01 | End: 2022-01-01

## 2022-01-01 RX ORDER — ALBUTEROL SULFATE 90 UG/1
2 AEROSOL, METERED RESPIRATORY (INHALATION) EVERY 6 HOURS PRN
Qty: 18 G | Refills: 0 | Status: SHIPPED | OUTPATIENT
Start: 2022-01-01

## 2022-01-01 RX ORDER — DOXYCYCLINE 100 MG/1
100 CAPSULE ORAL 2 TIMES DAILY
Qty: 20 CAPSULE | Refills: 0 | Status: SHIPPED | OUTPATIENT
Start: 2022-01-01 | End: 2022-01-01

## 2022-01-01 RX ORDER — METHYLPREDNISOLONE 4 MG
TABLET, DOSE PACK ORAL
Qty: 21 TABLET | Refills: 0 | Status: SHIPPED | OUTPATIENT
Start: 2022-01-01 | End: 2022-01-01

## 2022-01-01 RX ORDER — OXYCODONE HYDROCHLORIDE 5 MG/1
TABLET ORAL
Qty: 12 TABLET | Refills: 0 | Status: SHIPPED | OUTPATIENT
Start: 2022-01-01

## 2022-01-01 RX ORDER — OMEPRAZOLE 40 MG/1
CAPSULE, DELAYED RELEASE ORAL
Qty: 90 CAPSULE | Refills: 3 | Status: SHIPPED | OUTPATIENT
Start: 2022-01-01

## 2022-01-01 RX ORDER — PREDNISONE 10 MG/1
TABLET ORAL
Qty: 65 TABLET | Refills: 0 | Status: SHIPPED | OUTPATIENT
Start: 2022-01-01 | End: 2022-01-01

## 2022-01-01 RX ORDER — ALBUTEROL SULFATE 0.83 MG/ML
SOLUTION RESPIRATORY (INHALATION)
Qty: 75 ML | Refills: 0 | Status: SHIPPED | OUTPATIENT
Start: 2022-01-01

## 2022-01-01 RX ORDER — FLUTICASONE PROPIONATE AND SALMETEROL 500; 50 UG/1; UG/1
POWDER RESPIRATORY (INHALATION)
Qty: 3 EACH | Refills: 3 | Status: SHIPPED | OUTPATIENT
Start: 2022-01-01

## 2022-01-01 RX ORDER — LORAZEPAM 0.5 MG/1
TABLET ORAL
Qty: 90 TABLET | Refills: 3 | Status: SHIPPED | OUTPATIENT
Start: 2022-01-01 | End: 2022-01-01

## 2022-01-01 RX ORDER — BUPROPION HYDROCHLORIDE 75 MG/1
TABLET ORAL
Qty: 180 TABLET | Refills: 1 | Status: SHIPPED | OUTPATIENT
Start: 2022-01-01

## 2022-01-01 ASSESSMENT — PATIENT HEALTH QUESTIONNAIRE - PHQ9
SUM OF ALL RESPONSES TO PHQ QUESTIONS 1-9: 17
SUM OF ALL RESPONSES TO PHQ QUESTIONS 1-9: 12
SUM OF ALL RESPONSES TO PHQ QUESTIONS 1-9: 17
10. IF YOU CHECKED OFF ANY PROBLEMS, HOW DIFFICULT HAVE THESE PROBLEMS MADE IT FOR YOU TO DO YOUR WORK, TAKE CARE OF THINGS AT HOME, OR GET ALONG WITH OTHER PEOPLE: SOMEWHAT DIFFICULT

## 2022-01-01 ASSESSMENT — PAIN SCALES - GENERAL
PAINLEVEL: NO PAIN (0)
PAINLEVEL: NO PAIN (0)

## 2022-01-14 DIAGNOSIS — F32.0 MAJOR DEPRESSIVE DISORDER, SINGLE EPISODE, MILD (H): ICD-10-CM

## 2022-01-14 DIAGNOSIS — J44.9 CHRONIC OBSTRUCTIVE PULMONARY DISEASE, UNSPECIFIED COPD TYPE (H): ICD-10-CM

## 2022-01-18 RX ORDER — BUPROPION HYDROCHLORIDE 75 MG/1
TABLET ORAL
Qty: 360 TABLET | Refills: 1 | Status: SHIPPED | OUTPATIENT
Start: 2022-01-18 | End: 2022-01-01

## 2022-01-21 ENCOUNTER — TELEPHONE (OUTPATIENT)
Dept: FAMILY MEDICINE | Facility: CLINIC | Age: 77
End: 2022-01-21
Payer: MEDICARE

## 2022-01-21 ENCOUNTER — NURSE TRIAGE (OUTPATIENT)
Dept: NURSING | Facility: CLINIC | Age: 77
End: 2022-01-21
Payer: MEDICARE

## 2022-01-21 DIAGNOSIS — J44.1 COPD EXACERBATION (H): ICD-10-CM

## 2022-01-21 NOTE — TELEPHONE ENCOUNTER
Reason for Call:  Other     Detailed comments: Pt's grand-daugter called to report new symptoms pt is having. Pt is having increased shortness of breath, a cough that comes with green-ilana mucas, and a tight, heavy chest. Wondering what she should do to help with these symptoms.    Phone Number Patient can be reached at: Other phone number: [phone]    Best Time: anytime    Can we leave a detailed message on this number? YES    Call taken on 1/21/2022 at 1:46 PM by Jayla Mccord

## 2022-01-21 NOTE — TELEPHONE ENCOUNTER
S-(situation): call placed to patient    B-(background): tightness in chest, increase soa breath, green slim with cough. Duration 2 days ago. Patient states she is usually treated without appointment    A-(assessment):Has been vaccinated. No exposure to Covid positive person. No fever, no body ache, loss of taste/smell.    R-(recommendations): route to provider

## 2022-01-22 NOTE — TELEPHONE ENCOUNTER
Triage Call:     COPD is acting up  Pt is coughing up green sputum   No fever  She can feel that her breathing is tighter and when her sputum gets green she knows she needs treatment    Pt does not want to go into the clinic. She would prefer a virtual visit instead. She would like to stay away from patient's with COVID-19.     Disposition: See PCP within 24 hours. Pt was given information on the nearest [facility]. She is declining that an is OK with a virtual appt instead. Pt was transferred to scheduling to check availability.     Roula Gunderson RN  [facility] Nurse Advisor 7:22 PM 1/21/2022      Reason for Disposition    Change in color of sputum  (e.g., from white to yellow-green sputum)    Additional Information    Negative: Severe difficulty breathing (e.g., struggling for each breath, speaks in single words)    Negative: [1] Lips or face are bluish now AND [2] persists when not coughing    Negative: Sounds like a life-threatening emergency to the triager    Negative: Chest pain is main symptom    Negative: [1] Dry (non-productive) cough AND [2] < 3 weeks duration     (i.e., no sputum or minimal clear sputum)    Negative: Chest pain  (Exception: MILD central chest pain, present only when coughing)    Negative: Fever > 103 F (39.4 C)    Negative: [1] Fever > 101 F (38.3 C) AND [2] age > 60    Negative: [1] Fever > 100.0 F (37.8 C) AND [2] bedridden (e.g., nursing home patient, CVA, chronic illness, recovering from surgery)    Negative: [1] Fever > 100.0 F (37.8 C) AND [2] diabetes mellitus or weak immune system (e.g., HIV positive, cancer chemo, splenectomy, organ transplant, chronic steroids)    Negative: [1] Coughed up blood AND [2] > 1 tablespoon (15 ml) (Exception: blood-tinged sputum)    Negative: SEVERE coughing spells (e.g., whooping sound after coughing, vomiting after coughing)    Negative: [1] Continuous (nonstop) coughing interferes with work or school AND [2] no improvement using cough treatment per  protocol    Negative: Fever present > 3 days (72 hours)    Negative: Coughing up ifrah-colored sputum    Negative: Difficulty breathing  (Exception: no change from usual, chronic shortness of breath)    Negative: [1] Increasing difficulty breathing AND [2] always has some difficulty breathing    Negative: Patient sounds very sick or weak to the triager    Negative: [1] Wet (productive) cough AND [2] < 3 weeks duration     (i.e., white-yellow, yellow, green, or ifrah colored sputum)    Negative: [1] Previous asthma attacks AND [2] this feels like asthma attack    Protocols used: COUGH - CHRONIC-A-AH

## 2022-01-24 RX ORDER — AZITHROMYCIN 250 MG/1
TABLET, FILM COATED ORAL
Qty: 6 TABLET | Refills: 0 | Status: SHIPPED | OUTPATIENT
Start: 2022-01-24 | End: 2022-01-01

## 2022-01-24 RX ORDER — METHYLPREDNISOLONE 4 MG
TABLET, DOSE PACK ORAL
Qty: 21 TABLET | Refills: 0 | Status: SHIPPED | OUTPATIENT
Start: 2022-01-24 | End: 2022-01-01

## 2022-02-28 NOTE — TELEPHONE ENCOUNTER
S-(situation): I talked with Medardo.  She say Saige has increased shortness of breath with exertion, OK at rest. Walking 10-15 feet gets short of breath.  Green thick sputum.  Medardo says she gets this about every 3-4 weeks.  Decreased energy.  Denies fever, chills, nausea, vomiting or diarrhea.  Has cough.    B-(background): COPD    A-(assessment): COPD exasperation    R-(recommendations): asking for methylprednisolone and z pack  Petra Colbert RN

## 2022-02-28 NOTE — TELEPHONE ENCOUNTER
I talked with Dr Peters and sent RX's to the pharmacy.  Medardo notified and if Safia is worse, needs to be seen  Petra Colbert RN

## 2022-02-28 NOTE — TELEPHONE ENCOUNTER
Reason for call:  Patient reporting a symptom    Symptom or request: Pt's granddaughter says [place] has her monthly symptoms again that she gets during the winter months. She has increased shortness of breath, productive cough with thick green mucus and has no energy. She is wondering if Dr Peters wants to treat her for this again.       Have you been treated for this before? Yes    Additional comments: [facility] in [place]    Phone Number patient can be reached at:  [facility] [phone]    Best Time:  anytime    Can we leave a detailed message on this number:  YES    Call taken on 2/28/2022 at 7:45 AM by Patrica King

## 2022-03-03 NOTE — TELEPHONE ENCOUNTER
FYI~ I have approved Saige for up to $500 of the Pharmacy Assistance Fund to be filled at the [facility].    Saige, her Niece Taylor Devlin and the Pharmacy have been informed.    Noy Pickens  Prescription Assistance Supervisor  Pharmacy Assistance  [phone]

## 2022-03-04 NOTE — TELEPHONE ENCOUNTER
Pt's granddaughter calling hoping to get pt's medication refilled as soon as possible. She will be out of it after tomorrow.    Jayla Mccord Patient

## 2022-03-07 NOTE — TELEPHONE ENCOUNTER
Pt is asking for 1 month to go to the [facility] pharmacy to meet her Deductible.   Samantha Orn [facility] Sec

## 2022-03-09 NOTE — TELEPHONE ENCOUNTER
"Requested Prescriptions   Pending Prescriptions Disp Refills    ADVAIR DISKUS 500-50 MCG/DOSE inhaler 60 each      Sig: INHALE 1 DOSE BY MOUTH EVERY 12 HOURS        Long-Acting Beta Agonist Inhalers Protocol  Failed - 3/7/2022  9:16 AM        Failed - Order for Serevent, Striverdi, or Foradil and pt has steroid inhaler        Passed - Patient is age 12 or older        Passed - Recent (12 mo) or future (30 days) visit within the authorizing provider's specialty     Patient has had an office visit with the authorizing provider or a provider within the authorizing providers department within the previous 12 mos or has a future within next 30 days. See \"Patient Info\" tab in inbasket, or \"Choose Columns\" in Meds & Orders section of the refill encounter.              Passed - Medication is active on med list       Inhaled Steroids Protocol Passed - 3/7/2022  9:16 AM        Passed - Patient is age 12 or older        Passed - Recent (12 mo) or future (30 days) visit within the authorizing provider's specialty     Patient has had an office visit with the authorizing provider or a provider within the authorizing providers department within the previous 12 mos or has a future within next 30 days. See \"Patient Info\" tab in inbasket, or \"Choose Columns\" in Meds & Orders section of the refill encounter.              Passed - Medication is active on med list           VENTOLIN  (90 Base) MCG/ACT inhaler       Sig: Inhale 2 puffs into the lungs every 6 hours as needed for shortness of breath / dyspnea        Asthma Maintenance Inhalers - Anticholinergics Passed - 3/7/2022  9:16 AM        Passed - Patient is age 12 years or older        Passed - Recent (12 mo) or future (30 days) visit within the authorizing provider's specialty     Patient has had an office visit with the authorizing provider or a provider within the authorizing providers department within the previous 12 mos or has a future within next 30 days. See \"Patient " "Info\" tab in inbasket, or \"Choose Columns\" in Meds & Orders section of the refill encounter.              Passed - Medication is active on med list       Short-Acting Beta Agonist Inhalers Protocol  Passed - 3/7/2022  9:16 AM        Passed - Patient is age 12 or older        Passed - Recent (12 mo) or future (30 days) visit within the authorizing provider's specialty     Patient has had an office visit with the authorizing provider or a provider within the authorizing providers department within the previous 12 mos or has a future within next 30 days. See \"Patient Info\" tab in inbasket, or \"Choose Columns\" in Meds & Orders section of the refill encounter.              Passed - Medication is active on med list           albuterol (PROVENTIL) (2.5 MG/3ML) 0.083% neb solution 75 mL      Sig: USE ONE VIAL IN NEBULIZER EVERY 4 HOURS AS NEEDED FOR SHORTNESS OF BREATH /DYSPNEA        Asthma Maintenance Inhalers - Anticholinergics Passed - 3/7/2022  9:16 AM        Passed - Patient is age 12 years or older        Passed - Recent (12 mo) or future (30 days) visit within the authorizing provider's specialty     Patient has had an office visit with the authorizing provider or a provider within the authorizing providers department within the previous 12 mos or has a future within next 30 days. See \"Patient Info\" tab in inbasket, or \"Choose Columns\" in Meds & Orders section of the refill encounter.              Passed - Medication is active on med list       Short-Acting Beta Agonist Inhalers Protocol  Passed - 3/7/2022  9:16 AM        Passed - Patient is age 12 or older        Passed - Recent (12 mo) or future (30 days) visit within the authorizing provider's specialty     Patient has had an office visit with the authorizing provider or a provider within the authorizing providers department within the previous 12 mos or has a future within next 30 days. See \"Patient Info\" tab in inbasket, or \"Choose Columns\" in Meds & Orders " "section of the refill encounter.              Passed - Medication is active on med list           tiotropium (SPIRIVA RESPIMAT) 2.5 MCG/ACT inhaler       Sig: Inhale 1 puff into the lungs daily        Asthma Maintenance Inhalers - Anticholinergics Passed - 3/7/2022  9:16 AM        Passed - Patient is age 12 years or older        Passed - Recent (12 mo) or future (30 days) visit within the authorizing provider's specialty     Patient has had an office visit with the authorizing provider or a provider within the authorizing providers department within the previous 12 mos or has a future within next 30 days. See \"Patient Info\" tab in inbasket, or \"Choose Columns\" in Meds & Orders section of the refill encounter.              Passed - Medication is active on med list       Inhaled Steroids Protocol Passed - 3/7/2022  9:16 AM        Passed - Patient is age 12 or older        Passed - Recent (12 mo) or future (30 days) visit within the authorizing provider's specialty     Patient has had an office visit with the authorizing provider or a provider within the authorizing providers department within the previous 12 mos or has a future within next 30 days. See \"Patient Info\" tab in inbasket, or \"Choose Columns\" in Meds & Orders section of the refill encounter.              Passed - Medication is active on med list              "

## 2022-03-10 NOTE — TELEPHONE ENCOUNTER
Reason for call:  Patient reporting a symptom    Symptom or request: Mian Batres says Virginia was treated over the phone by Dr Peters for suspected pneumonia on 2/28 with a Z-nevin and Prednisone. Medardo says she continues to have a persistent, productive cough. Her cough keeps her up at night. She has thick green phlegm.    Duration (how long have symptoms been present): About 2 weeks    Have you been treated for this before? Yes    Additional comments:      Phone Number patient can be reached at:   [phone]  Medardo mian    Best Time:  anytime    Can we leave a detailed message on this number:  YES    Call taken on 3/10/2022 at 12:29 PM by Patrica King

## 2022-03-10 NOTE — TELEPHONE ENCOUNTER
Call placed to granddaughter.  Message left, recommend bringing grandma to urgent care for evaluation.    Asked to return call if questions.

## 2022-03-11 NOTE — TELEPHONE ENCOUNTER
Granddtr states pt continues to take Nystatin susp for thrush sx.   Routing refill request to covering provider for review/approval because:  Not on current med list.  TONE Walton RN

## 2022-04-04 NOTE — TELEPHONE ENCOUNTER
Requested Prescriptions   Pending Prescriptions Disp Refills     LORazepam (ATIVAN) 0.5 MG tablet [Pharmacy Med Name: LORAZEPAM 0.5MG TABLETS] 90 tablet      Sig: TAKE 1 TABLET(0.5 MG) BY MOUTH EVERY 8 HOURS AS NEEDED FOR ANXIETY       There is no refill protocol information for this order        Last Written Prescription Date:  3/5/22  Last Fill Quantity: 90,  # refills: 0   Last office visit: 10/8/2021 with prescribing provider:     Future Office Visit:

## 2022-05-05 NOTE — TELEPHONE ENCOUNTER
Requested Prescriptions   Pending Prescriptions Disp Refills     LORazepam (ATIVAN) 0.5 MG tablet [Pharmacy Med Name: LORAZEPAM 0.5MG TABLETS] 90 tablet      Sig: TAKE 1 TABLET(0.5 MG) BY MOUTH EVERY 8 HOURS AS NEEDED FOR ANXIETY       There is no refill protocol information for this order        Last Written Prescription Date:  4/4/22  Last Fill Quantity: 90,  # refills: 0   Last office visit: 10/8/2021 with prescribing provider:     Future Office Visit:   Next 5 appointments (look out 90 days)    Tomer 10, 2022 11:00 AM  (Arrive by 10:40 AM)  Provider Visit with Suzy Peters MD  [facility] ([facility] ) [address] [place] [id number] [phone]

## 2022-05-11 NOTE — TELEPHONE ENCOUNTER
Reason for call:  Patient reporting a symptom    Symptom or request: Pt's granddaughter, Benton says Saige is getting signs of a  URI again. She has a heavy chest, productive cough with green phlegm. No temp.  She is wondering if Dr Peters wants to treat this as she has in the past.     Duration (how long have symptoms been present):   2 days    Have you been treated for this before? Yes    Additional comments: [facility] in [place]    Phone Number patient can be reached at:  [place] [phone]    Best Time:  anytime    Can we leave a detailed message on this number:  YES    Call taken on 5/11/2022 at 12:28 PM by Patrica King

## 2022-06-03 NOTE — TELEPHONE ENCOUNTER
Call tammi  I would treat her with doxycycline 100mg bid for 10 days this time with the prednisone   I sent these in

## 2022-06-03 NOTE — TELEPHONE ENCOUNTER
Reason for Call:  Other    Detailed comments: Pt's granddaughter calling because Dr. Peters has been treating pt for suspected pneumonia. Pt is still have symptoms such as the wet cough and coughing up green mucus and a raspy voice. Pt does have an appointment to see Dr. ePters next Friday, but they are wondering if Dr. Peters can prescribe pt another z-nevin to help with these symptoms.    Phone Number Patient can be reached at: Other phone number:  [phone]  Benton - Granddaughter    Best Time: anytime    Can we leave a detailed message on this number? YES    Call taken on 6/3/2022 at 12:25 PM by Jayla Mccord

## 2022-06-03 NOTE — TELEPHONE ENCOUNTER
Seth informed, reviewed Rx dose/ directions.  To call if sx persist/ worsen or UC/ED.  Has appt with Dr. Peters 06-10-22.  TONE Walton RN

## 2022-06-06 NOTE — TELEPHONE ENCOUNTER
Routing refill request to provider for review/approval because:  Drug not on the [facility] refill protocol       [facility] Aponte           suspected energy intake exceeds estimated needs need for carbohydrate controlled diet

## 2022-06-10 PROBLEM — E43 UNSPECIFIED SEVERE PROTEIN-CALORIE MALNUTRITION (H): Status: ACTIVE | Noted: 2022-01-01

## 2022-06-10 NOTE — PROGRESS NOTES
Assessment & Plan     Chronic obstructive pulmonary disease, unspecified COPD type (H)  Stable on oxygen and inhalers   Discussed DNR status   She does want to consider intubation still   abx and prednisone early if she starts to get sick - will put refills on next Rx   - LORazepam (ATIVAN) 0.5 MG tablet; TAKE 1 TABLET(0.5 MG) BY MOUTH EVERY 8 HOURS AS NEEDED FOR ANXIETY    Stage 3 chronic kidney disease, unspecified whether stage 3a or 3b CKD (H)  Stable no change in treatment plan.     Unspecified severe protein-calorie malnutrition (H)  Doing supplements daily and weight is slowly going up     Generalized anxiety disorder  Stable no change in treatment plan.   - LORazepam (ATIVAN) 0.5 MG tablet; TAKE 1 TABLET(0.5 MG) BY MOUTH EVERY 8 HOURS AS NEEDED FOR ANXIETY    Major depressive disorder, single episode, mild (H)  Stable no change in treatment plan.   - sertraline (ZOLOFT) 50 MG tablet; Take 3 tablets (150 mg) by mouth daily                 Return in about 2 months (around 8/10/2022).    Suzy Peters MD  [facility]    Nia Moulton is a 77 year old who presents for the following health issues   Here grand daughter-Benton (patient is staying in her home) here with other grand daughter Skylar LYNN     COPD Follow-Up    Overall, how are your COPD symptoms since your last clinic visit?  No change    How much fatigue or shortness of breath do you have when you are walking?  Same as usual    How much shortness of breath do you have when you are resting?  Same as usual    How often do you cough? Often    Have you noticed any change in your sputum/phlegm?  Yes- light green-taking abx    Have you experienced a recent fever? No    Please describe how far you can walk without stopping to rest:  Less than 10 feet    How many flights of stairs are you able to walk up without stopping?  None    Have you had any Emergency Room Visits, Urgent Care Visits, or Hospital Admissions  because of your COPD since your last office visit?  No    History   Smoking Status     Former Smoker     Packs/day: 4.00     Years: 36.00     Types: Cigarettes     Quit date: 1993   Smokeless Tobacco     Never Used     Comment: Started at age 12     No results found for: FEV1, IIR2XRV    Chronic Kidney Disease Follow-up    Do you take any over the counter pain medicine?: Yes  What over the counter medicine are you taking for your pain?:  Tylenol      How often do you take this medicine?:  Two times daily      How many servings of fruits and vegetables do you eat daily?  2-3    On average, how many sweetened beverages do you drink each day (Examples: soda, juice, sweet tea, etc.  Do NOT count diet or artificially sweetened beverages)?   2    How many days per week do you exercise enough to make your heart beat faster? 3 or less    How many minutes a day do you exercise enough to make your heart beat faster? 9 or less    How many days per week do you miss taking your medication? 0    Depression and Anxiety Follow-Up    How are you doing with your depression since your last visit? No change    How are you doing with your anxiety since your last visit?  No change    Are you having other symptoms that might be associated with depression or anxiety? No    Have you had a significant life event? No     Do you have any concerns with your use of alcohol or other drugs? No    Social History     Tobacco Use     Smoking status: Former Smoker     Packs/day: 4.00     Years: 36.00     Pack years: 144.00     Types: Cigarettes     Quit date: 1993     Years since quittin.5     Smokeless tobacco: Never Used     Tobacco comment: Started at age 12   Vaping Use     Vaping Use: Never used   Substance Use Topics     Alcohol use: No     Drug use: No     PHQ 2021 2021 6/10/2022   PHQ-9 Total Score 18 4 12   Q9: Thoughts of better off dead/self-harm past 2 weeks Not at all Not at all Not at all     ROE-7 SCORE  3/24/2021 4/7/2021 5/19/2021   Total Score - - -   Total Score - - -   Total Score 19 14 7         Suicide Assessment Five-step Evaluation and Treatment (SAFE-T)          Review of Systems   Constitutional, HEENT, cardiovascular, pulmonary, gi and gu systems are negative, except as otherwise noted.      Objective    /86   Pulse (!) 122   Temp 98.5  F (36.9  C)   Resp 24   Wt 36.7 kg (81 lb)   LMP  (LMP Unknown)   SpO2 95%   Breastfeeding No   BMI 15.82 kg/m    Body mass index is 15.82 kg/m .  Physical Exam   GENERAL APPEARANCE: alert, no distress and Nourishment underweight   NECK: no adenopathy, no asymmetry, masses, or scars and thyroid normal to palpation  RESP: diminished BS throughout - no rales, rhonchi or wheezes  CV: regular rates and rhythm, normal S1 S2, no S3 or S4 and no murmur, click or rub  MS: extremities normal- no gross deformities noted  PSYCH: mentation appears normal and affect normal/bright

## 2022-06-10 NOTE — COMMUNITY RESOURCES LIST (ENGLISH)
06/10/2022   [facility] - Outpatient Clinics  Mandy Salinas  For questions about this resource list or additional care needs, please contact your primary care clinic or care manager.  Phone: [phone]   Email: N/A   Address: [address] [place] [id number]   Hours: N/A        Hotlines and Helplines       Hotline - Crisis help  1  [facility] Mobile Crisis Services - Mobile Crisis Response Distance: 9.65 miles      COVID-19 Status: Phone/Virtual   [address] [facility] [place] [address]  Language: English  Hours: Mon - Sun Open 24 Hours  Fees: Insurance   Phone: (707) 115-3843 Email: info@[employer] Website: [email or web address]-[email or web address]-services-[email or web address]     2  [facility] Distance: 9.68 miles      COVID-19 Status: Phone/Virtual   [address] [facility] [[email or web address] [place] [address]  Language: English  Hours: Mon - Sun Open 24 Hours   Phone: (749) 470-3598 Email: [email or web address] Website: [email or web address]          Mental Health       Individual counseling  3  [facility] - Dialectical Behavior Therapy (DBT) Distance: 3.19 miles      COVID-19 Status: Regular Operations, COVID-19 Status: Phone/Virtual   [address] [address]  Language: English  Hours: Mon - Tue 8:00 AM - 4:30 PM , Wed 8:30 AM - 4:30 PM , Thu - Fri 8:00 AM - 4:30 PM  Fees: Insurance, Self Pay, Sliding Fee   Phone: (232) 294-7097 Email: info@[employer] Website: [email or web address]     4  [facility] Distance: 8.75 miles      COVID-19 Status: Regular Operations, COVID-19 Status: Phone/Virtual   [address] [place] [address]  Language: English  Hours: Mon - Fri 8:00 AM - 5:00 PM  Fees: Insurance, Self Pay   Phone: (367) 122-4729 Email: yesenia@[employer] Website:  http://[employer]/     Mental health crisis care  5  [facility] - Crisis Assessment and Stabilization Services Distance: 3.19 miles      COVID-19 Status: Regular Operations, COVID-19 Status: Phone/Virtual   [address] [address]  Language: English  Hours: Mon - Sun Open 24 Hours  Fees: Insurance, Self Pay, Sliding Fee   Phone: (374) 908-6732 Email: info@[employer] Website: [email or web address]/          Important Numbers & Websites       Emergency Services   911  [facility] Services   311  Poison Control   (768) 644-5584  Suicide Prevention Lifeline   (461) 200-8670 (TALK)  Child Abuse Hotline   (685) 707-9731 (4-A-Child)  Sexual Assault Hotline   (946) 147-5873 (HOPE)  National Runaway Safeline   (361) 537-2648 (RUNAWAY)  All-Options Talkline   (591) 827-8790  Substance Abuse Referral   (638) 949-7656 (HELP)

## 2022-06-16 PROBLEM — F32.0 MAJOR DEPRESSIVE DISORDER, SINGLE EPISODE, MILD (H): Status: ACTIVE | Noted: 2022-01-01

## 2022-06-28 NOTE — TELEPHONE ENCOUNTER
"Requested Prescriptions   Pending Prescriptions Disp Refills    buPROPion (WELLBUTRIN) 75 MG tablet 360 tablet 1     Sig: Take 150 mg by mouth twice daily        SSRIs Protocol Failed - 6/28/2022  3:33 PM        Failed - PHQ-9 score less than 5 in past 6 months     Please review last PHQ-9 score.           Passed - Medication is Bupropion     If the medication is Bupropion (Wellbutrin), and the patient is taking for smoking cessation; OK to refill.            Passed - Medication is active on med list        Passed - Patient is age 18 or older        Passed - No active pregnancy on record        Passed - No positive pregnancy test in last 12 months        Passed - Recent (6 mo) or future (30 days) visit within the authorizing provider's specialty     Patient had office visit in the last 6 months or has a visit in the next 30 days with authorizing provider or within the authorizing provider's specialty.  See \"Patient Info\" tab in inbasket, or \"Choose Columns\" in Meds & Orders section of the refill encounter.                  "

## 2022-06-29 NOTE — TELEPHONE ENCOUNTER
Rx for medrol dose pack and z-pack sent to pharmacy per verbal order from Dr Peters. Granddaughter notified. Dr Peters will address the hyoscyamine tomorrow.   Mandy MCCOY RN

## 2022-06-29 NOTE — TELEPHONE ENCOUNTER
"Reason for call:  Patient reporting a symptom    Symptom or request:   1.Pt's granddaughter says Saige still has a \"yucky cough\" that keeps her up at night. It is productive and is now a light green. Does Dr Peters want to prescribe something for her?  2. At the last visit, she said Dr Saavedra was going to prescribe Hyoscyamine for her but the pharmacy never got it.    Have you been treated for this before? Yes    Additional comments:  [facility]  She is aware that Dr Peters is not in the office today.     Phone Number patient can be reached at:    [place]- Granddaughter [phone]    Best Time:  anytime    Can we leave a detailed message on this number:  YES    Call taken on 6/29/2022 at 1:58 PM by Patrica King  "

## 2022-06-30 NOTE — TELEPHONE ENCOUNTER
Please call granddaughter and let her know the hyoscyamine   Is at the pharmacy as well   Try this and and see it may or may not work for secretions

## 2022-07-14 NOTE — TELEPHONE ENCOUNTER
I am in process of applying to the  assistance program.  These programs require a hand signed, brand name, hard copy script be submitted with their application.    Please hand sign a BRAND name, hard copy scripts for:      SPIRIVA RESPIMAT      ADVAIR DISKUS    Please send the HARD COPY scripts to me     via interoffice mail  FPS Noy Milligan     or via US mail  at:   [facility]  Noy Pickens  [address] [place]  [address]    Thanks so much for your help!    Noy Pickens  Prescription Assistance Supervisor  Pharmacy Assistance  [phone]

## 2022-07-22 NOTE — TELEPHONE ENCOUNTER
Granddtr reports increased SOA, wheezing, cough productive of greenish colored sputum past 1-2 days. No fevers, chills. Denies acute resp distress. 0n 02 3 L/M cont. 02 sats at rest 95% and greater, dips to low 80% with exertion.   Had z- pack and medrol dose pack prescribed 06-29-22 for similar sx. Sx improved. Has hyoscyamine for cough. Using inhalant meds as prescribed.   Granddtr requesting abx and possibly steroid.  Please advise.  TONE Walton RN

## 2022-07-22 NOTE — TELEPHONE ENCOUNTER
Reason for call:  Patient reporting a symptom    Symptom or request: Wheezing, coughing, coughing up green mucus, shortness of breath    Duration (how long have symptoms been present): A couple days    Additional comments: Wondering what they should do.    Phone Number patient can be reached at:  Cell number on file:    Telephone Information:   Mobile [phone]       Best Time:  anytime    Can we leave a detailed message on this number:  YES    Call taken on 7/22/2022 at 9:56 AM by Jayla Mccord

## 2022-07-23 NOTE — DISCHARGE INSTRUCTIONS
Return if symptoms worsen or new symptoms develop.  Follow-up with primary care physician next available.  Drink plenty of fluids.  Gradually advance diet with soft foods chew your food very well.  Follow-up with gastroenterology for scoping and probable dilation.  If difficulty swallowing or other symptoms present please return for recheck.

## 2022-07-23 NOTE — ED TRIAGE NOTES
Here with possible hamburger stuck in throat since about 1430 this afternoon. 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     Triage Assessment     Row Name 07/22/22 1906       Triage Assessment (Adult)    Airway WDL WDL;airway symptoms  feels like hamburger is stuck       Respiratory WDL    Respiratory WDL WDL       Skin Circulation/Temperature WDL    Skin Circulation/Temperature WDL WDL       Cardiac WDL    Cardiac WDL WDL       Peripheral/Neurovascular WDL    Peripheral Neurovascular WDL WDL       Cognitive/Neuro/Behavioral WDL    Cognitive/Neuro/Behavioral WDL WDL

## 2022-07-24 NOTE — ED PROVIDER NOTES
History     Chief Complaint   Patient presents with     foreign body in throat     HPI  Tari Wiley is a 77 year old female with past medical history significant for hyperlipidemia anemia gastroenteritis, COPD, esophageal stenosis CKD anxiety who presents emergency department complaining of difficulty swallowing.  All patient states she ate hamburger this afternoon and has been having trouble keeping her secretions down.  She has had previous episodes of this in has been noted to have esophageal strictures.  She has been dilated in the past but has not had this done for some time.  She does have difficulty swallowing certain foods but today hamburger has been stuck and she has been unable to maintain secretions.  She denies any chest pain she is not short of breath she has not had any fevers or chills she denies any focal numbness weakness in extremities she has not had abdominal pain bowel or bladder dysfunction.  She denies calf pain or rash.    Allergies:  Allergies   Allergen Reactions     Sulfa Drugs Swelling and Difficulty breathing       Problem List:    Patient Active Problem List    Diagnosis Date Noted     Pneumonia 05/28/2012     Priority: High     Major depressive disorder, single episode, mild (H) 06/16/2022     Priority: Medium     Unspecified severe protein-calorie malnutrition (H) 06/10/2022     Priority: Medium     Chronic kidney disease, stage 3 (H) 03/24/2021     Priority: Medium     Esophageal candidiasis (H) 04/02/2019     Priority: Medium     Esophageal stenosis 04/02/2019     Priority: Medium     Mammogram declined 02/28/2017     Priority: Medium     Health Care Home-Not active 09/08/2014     Priority: Medium     State Tier Level:    Status:  Unable to reach  Care Coordinator:  Talita Peña    See Letters for [facility] Care Plan  Date:  September 8, 2014           Gastroenteritis 05/28/2012     Priority: Medium     Thrush, oral 11/12/2011     Priority: Medium     Anemia 11/11/2011      Priority: Medium     Generalized weakness 11/10/2011     Priority: Medium     Personal history of smoking 2010     Priority: Medium     Hyperlipidemia LDL goal <130 10/31/2010     Priority: Medium     COPD (chronic obstructive pulmonary disease) (HCC) 06/10/2005     Priority: Medium     COPD exacerbation (H) 06/10/2005     Priority: Medium     Generalized anxiety disorder 2011     Priority: Low     Diagnosis updated by automated process. Provider to review and confirm.          Past Medical History:    Past Medical History:   Diagnosis Date     ROE (generalised anxiety disorder)      Hyperlipidemia LDL goal < 130      Tobacco abuse        Past Surgical History:    Past Surgical History:   Procedure Laterality Date     APPENDECTOMY       C/SECTION, LOW TRANSVERSE  1982    , Low Transverse     CATARACT IOL, RT/LT      right-2013     CHOLECYSTECTOMY, OPEN       ESOPHAGOSCOPY, GASTROSCOPY, DUODENOSCOPY (EGD), COMBINED N/A 2019    Procedure: COMBINED ESOPHAGOSCOPY, GASTROSCOPY, DUODENOSCOPY (EGD);  Surgeon: Justin Davila MD;  Location: [facility]     ESOPHAGOSCOPY, GASTROSCOPY, DUODENOSCOPY (EGD), DILATATION, COMBINED N/A 2019    Procedure: Upper Endoscopy With Dilation;  Surgeon: John Perez MD;  Location: [facility]     ESOPHAGOSCOPY, GASTROSCOPY, DUODENOSCOPY (EGD), RESECT MUCOSA, COMBINED N/A 3/1/2019    Procedure: Upper Endoscopy With EMR, and brushings;  Surgeon: John Perez MD;  Location: [facility] OR     SURGICAL HISTORY OF -       stent to biliary tract       Family History:    Family History   Problem Relation Age of Onset     Alcohol/Drug Father      Cancer Brother        Social History:  Marital Status:   [4]  Social History     Tobacco Use     Smoking status: Former Smoker     Packs/day: 4.00     Years: 36.00     Pack years: 144.00     Types: Cigarettes     Quit date: 1993     Years since quittin.6     Smokeless tobacco: Never Used     Tobacco comment:  Started at age 12   Vaping Use     Vaping Use: Never used   Substance Use Topics     Alcohol use: No     Drug use: No        Medications:    acetaminophen (TYLENOL) 500 MG tablet  albuterol (PROVENTIL) (2.5 MG/3ML) 0.083% neb solution  aspirin (ASA) 81 MG chewable tablet  azithromycin (ZITHROMAX) 250 MG tablet  azithromycin (ZITHROMAX) 250 MG tablet  buPROPion (WELLBUTRIN) 75 MG tablet  fluticasone-salmeterol (ADVAIR DISKUS) 500-50 MCG/ACT inhaler  hyoscyamine SL (LEVSIN/SL) 0.125 MG sublingual tablet  LORazepam (ATIVAN) 0.5 MG tablet  melatonin 3 MG tablet  methylPREDNISolone (MEDROL DOSEPAK) 4 MG tablet therapy pack  methylPREDNISolone (MEDROL DOSEPAK) 4 MG tablet therapy pack  nystatin (MYCOSTATIN) 175120 UNIT/ML suspension  omeprazole (PRILOSEC) 40 MG DR capsule  ondansetron (ZOFRAN-ODT) 4 MG ODT tab  order for DME  SENNA-docusate sodium (SENNA S) 8.6-50 MG tablet  sertraline (ZOLOFT) 50 MG tablet  tiotropium (SPIRIVA RESPIMAT) 2.5 MCG/ACT inhaler  VENTOLIN  (90 Base) MCG/ACT inhaler          Review of Systems  All systems reviewed and other than pertinent positives and negatives in HPI all other systems are negative.  Physical Exam   BP: (!) 157/76  Pulse: 110  Temp: 97  F (36.1  C)  Resp: 20  Height: 152.4 cm (5')  Weight: 37.6 kg (83 lb)  SpO2: 94 %      Physical Exam  Vitals and nursing note reviewed.   Constitutional:       Appearance: She is not toxic-appearing or diaphoretic.      Comments: Thin frail chronically ill-appearing female no acute distress.   HENT:      Head: Normocephalic and atraumatic.      Nose: Nose normal.      Mouth/Throat:      Mouth: Mucous membranes are moist.      Pharynx: Oropharynx is clear.      Comments: Posterior pharynx without erythema edema no evidence of thrush  Eyes:      Conjunctiva/sclera: Conjunctivae normal.   Cardiovascular:      Rate and Rhythm: Normal rate and regular rhythm.      Pulses: Normal pulses.      Heart sounds: Normal heart sounds. No murmur  heard.  Pulmonary:      Effort: Pulmonary effort is normal.      Breath sounds: Normal breath sounds. No stridor. No wheezing or rhonchi.   Abdominal:      General: Abdomen is flat. Bowel sounds are normal. There is no distension.      Palpations: Abdomen is soft.      Tenderness: There is no abdominal tenderness. There is no right CVA tenderness or left CVA tenderness.   Musculoskeletal:         General: No swelling or tenderness. Normal range of motion.      Cervical back: Normal range of motion and neck supple.      Right lower leg: No edema.      Left lower leg: No edema.   Skin:     General: Skin is warm and dry.      Capillary Refill: Capillary refill takes less than 2 seconds.      Findings: No rash.   Neurological:      General: No focal deficit present.      Mental Status: She is alert and oriented to person, place, and time.      Sensory: No sensory deficit.      Motor: No weakness.      Coordination: Coordination normal.   Psychiatric:         Mood and Affect: Mood normal.         ED Course                 Procedures              Critical Care time:  none                   Results for orders placed or performed during the hospital encounter of 07/22/22   XR Chest Port 1 View    Narrative    EXAM: XR CHEST PORT 1 VIEW  LOCATION: [facility]  DATE/TIME: 7/22/2022 8:58 PM    INDICATION: esophageal foreign body  COMPARISON: 07/13/2021      Impression    IMPRESSION: No acute cardiopulmonary abnormality. Scarring in the upper lungs.         Assessments & Plan (with Medical Decision Making) records were reviewed.  Patient on my arrival with taking a sip of Mountain Dew and was able to swallow that she then drank some further difficulty.  She was retaining her secretions and observation.  I did not daily 1 view chest x-ray to rule out aspiration or other abnormality and found no acute cardiopulmonary abnormality.  Patient will be referred to GI for upper endoscopy.  She she really  semisolid food in order to any other food very well before swallowing and she should return if symptoms worsen or new symptoms develop.  Patient and granddaughter are in agreement this plan.     I have reviewed the nursing notes.    I have reviewed the findings, diagnosis, plan and need for follow up with the patient.       Discharge Medication List as of 7/22/2022  9:43 PM          Final diagnoses:   Esophageal foreign body, initial encounter       7/22/2022   [facility] EMERGENCY DEPT     Lazaro Mahmood MD  07/24/22 0972

## 2022-07-31 NOTE — TELEPHONE ENCOUNTER
FYI~ A refill has been made to the  assistance program for Spiriva Handihaler.    A 90 day supply of Spiriva Handihaler will be delivered to Saige's home within 5-7 business days.    Thank you,    Cheryl Pierre  Prescription Assistance        Unknown

## 2022-08-26 NOTE — TELEPHONE ENCOUNTER
Arias pappas called  Pt c/o   Dry, hacking cough, green/yellow phlegm. 1 week  Little bit of SOB - more than normal but not bad  No Covid testing.   Hasn't used Nebulizer.  Using inhalers.

## 2022-08-29 NOTE — TELEPHONE ENCOUNTER
Saige has been approved to  assistance programs for Advair diskus & Spiriva Respimat through December 2022.  She will receive this medication at no cost through the enrollment period.    A  90 day supply of ADVAIR DISKUS & SPIRIVA RESPIMAT has been delivered to Saige's home.    Saige or Taylor will contact my office for refills as we must work directly with the .  I will note EPIC as each refill is requested.    Thanks so much for your help!    Noy Pickens  Prescription Assistance Supervisor  Pharmacy Assistance  [phone]

## 2022-09-20 NOTE — TELEPHONE ENCOUNTER
Reason for Call:  Other prescription    Detailed comments: Pain medication requesting Oxycodone 5 mg.   Pt uses this for back pain. Pt is running low on this and could she get another RX    Phone Number Patient can be reached at: Home number on file Mari Grand daughter [phone] or Nicolasa  [phone]    Best Time: Any Time      Can we leave a detailed message on this number? YES    Call taken on 9/20/2022 at 10:31 AM by Samantha Cardenas

## 2022-09-20 NOTE — TELEPHONE ENCOUNTER
"S-(situation): Per patient's granddaughter Benton, left hip/sciatica painful    B-(background): Left hip surgery 07/2021. Had oxycodone left from surgery, is not out yet.     A-(assessment): Patient started having some \"muscle pain, more like sciatica\" after lifting something. She is able to sleep all night. Wakes up stiff and hollers in pain. Chiropractic has helped. She has been using oxycodone 1/2 tab in the morning but not everyday which helps. She does not take with lorazepam. Is using tylenol as well.     R-(recommendations): Would a virtual visit be ok to address this or would you prefer in-person? Family hoping to not have to come in. [facility] if appt not needed.  Mandy MCCOY RN      "

## 2022-09-26 NOTE — TELEPHONE ENCOUNTER
Taylor has contacted the Pharmacy Assistance Program to refill Gingers Spiriva Combivent & Advair Diskus.    We gave phone numbers to Taylor for Spiriva/BI Cares and [employer]/Advair Diskus to order Saige's refills.  A 90 days supply of each should be delivered to Saige's home.    Noy Pickens  Prescription Assistance Supervisor  Pharmacy Assistance  [phone]

## 2022-09-28 NOTE — PROGRESS NOTES
Ginger is a 77 year old who is being evaluated via a billable video visit.      How would you like to obtain your AVS? Mail a copy  If the video visit is dropped, the invitation should be resent by: Text to cell phone: [phone]  Will anyone else be joining your video visit? No        Assessment & Plan     Chronic bilateral low back pain without sciatica  Chronic   Has been lifting a  and flared her back   The 1/2 tab of oxy helps uses off and on   - oxyCODONE (ROXICODONE) 5 MG tablet; 1/2 tab daily as needed severe back pain    COPD exacerbation (H)  On chronic oxygen   Seems to be having more infections lately   - azithromycin (ZITHROMAX) 250 MG tablet; Two tablets first day, then one tablet daily for four days.  - methylPREDNISolone (MEDROL DOSEPAK) 4 MG tablet therapy pack; Follow Package Directions    Stage 3 chronic kidney disease, unspecified whether stage 3a or 3b CKD (H)                 Patient Instructions   Meds for cough and sent to pharmacy     Pain meds for severe pain     USE WALKER at home need to prevent a fall       Return in about 3 months (around 2022) for using an in person visit, with me.    Suzy Peters MD  [facility]    Subjective   Ginger is a 77 year old, presenting for the following health issues:  No chief complaint on file.      HPI     Bobby  Refill on pain meds to use as needed  Pulled muscle in back - has seen chiropractor which seems to help        Thinking start of pneumonia  Coughing, green phlegm  RESPIRATORY SYMPTOMS      Duration: beginning of the week - got worse last night with coughing    Description  cough, wheezing and fatigue/malaise    Severity: mild    Accompanying signs and symptoms: None    History (predisposing factors):  COPD    Precipitating or alleviating factors: None    Therapies tried and outcome:  none           Pain History:  When did you first notice your pain? - Chronic Pain   Have you seen this provider  for your pain in the past?   No   Where in your body do your have pain? Across low back  Had 3 falls within the last year  Are you seeing anyone else for your pain? No  What makes your pain better? Chiropractor helps - has used tylenol, asper creme and patches - had some oxycodone left over 5mg and was taking 1/2 tablet  What makes your pain worse? Laying, sitting, walking, bending  How has pain affected your ability to work? Not applicable  Who lives in your household? yoandy take care    PHQ-9 SCORE 4/7/2021 5/19/2021 6/10/2022   PHQ-9 Total Score - - -   PHQ-9 Total Score MyChart - - -   PHQ-9 Total Score 18 4 12               Chronic Pain - Initial Assessment:                  Review of Systems   Constitutional, HEENT, cardiovascular, pulmonary, gi and gu systems are negative, except as otherwise noted.      Objective           Vitals:  No vitals were obtained today due to virtual visit.    Physical Exam   GENERAL: alert, pale, frail and elderly  EYES: Eyes grossly normal to inspection.  No discharge or erythema, or obvious scleral/conjunctival abnormalities.  RESP: No audible wheeze, cough, or visible cyanosis.  No visible retractions or increased work of breathing.    SKIN: Visible skin clear. No significant rash, abnormal pigmentation or lesions.  NEURO: Cranial nerves grossly intact.  Mentation and speech appropriate for age.  PSYCH: Mentation appears normal, affect normal/bright, judgement and insight intact, normal speech and appearance well-groomed.                Video-Visit Details    Video Start Time: 505pm    Type of service:  Video Visit    Video End Time:5:41 PM    Originating Location (pt. Location): Home    Distant Location (provider location):  [facility]     Platform used for Video Visit: [employer]

## 2022-09-28 NOTE — PATIENT INSTRUCTIONS
Meds for cough and sent to pharmacy     Pain meds for severe pain     USE WALKER at home need to prevent a fall

## 2022-10-10 NOTE — TELEPHONE ENCOUNTER
Needs to get her back to GI   She has had recurrent issues with strictures and needing dilation   This is likely what needs to happen   For sure she needs a scope

## 2022-10-10 NOTE — TELEPHONE ENCOUNTER
Granddaughter called back and is needing a referral sent to the [facility] for pt to be seen there for care.

## 2022-10-10 NOTE — TELEPHONE ENCOUNTER
Judithr informed as noted per MD.  Has gone to [facility] in past, kehinde will call there.  TONE Walton RN

## 2022-10-10 NOTE — TELEPHONE ENCOUNTER
Reason for Call:  Difficulty swallowing     Detailed comments: Difficulty swallowing / tightness in throat, some SOB during this time, heart rate goes up with this. This happens frequently multiple times a week.   Pt felt the fluid into her nose and back down. Pt felt a lump in her throat in her throat. Increasing difficulty swallowing.  Grand daughter said she has been discussing this issue with providers already.   Grand daughter is asking what else she can do for pt.     Phone Number Patient can be reached at: Other phone number:  [phone]    Best Time: Any Time      Can we leave a detailed message on this number? YES    Call taken on 10/10/2022 at 10:50 AM by Samantha Cardenas

## 2022-10-11 NOTE — TELEPHONE ENCOUNTER
Unable to leave a message mail box is full.    Maria Luz Swain  [facility] Float

## 2022-10-13 NOTE — TELEPHONE ENCOUNTER
Please place Referral to [facility] - Will fax referral to [phone]  Tele [phone]  [facility]

## 2022-10-28 NOTE — TELEPHONE ENCOUNTER
"Benton returned call. Saige used to live with Benton and now lives back in her home in [place] with another granddaughter. They have noticed increase in confusion which has been a sign of UTIs in the past. She thought her granddaughter was pranking her last night because she heard noises outside her bedroom wall. She does complain at times of her lower back hurting which is also a chronic issue. No known fever.   Family also concerned Saige has started \"deep coughing again\" and may need another round of azithromycin and prednisone. [facility] pharmacy. Dr Peters, are you willing to order UA and watch for results? Or should patient submit evisit versus urgent care?  Thank you,   Mandy MCCOY RN    "

## 2022-10-28 NOTE — TELEPHONE ENCOUNTER
Left message for Benton to return call to inquire if Saige has any other symptoms before asking Dr Peters.  Mandy MCCOY RN

## 2022-10-28 NOTE — TELEPHONE ENCOUNTER
Reason for Call:  Confusion     Detailed comments: Pt is little more confused. Wondering if she has UTI going on. Wondering if they could just get a order to check for UTI first.    Phone Number Patient can be reached at: Other phone number:  Benton [phone]*    Best Time: Any Time      Can we leave a detailed message on this number? YES    Call taken on 10/28/2022 at 7:26 AM by Samantha Cardenas

## 2022-10-28 NOTE — TELEPHONE ENCOUNTER
After speaking with Dr Peters, discussed with Benton patient's exacerbations may be related to environmental exposure? Would need an office visit to discuss possible treatment options. Benton did say Saige is having such trouble swallowing and she feels Saige may be aspirating. She coughs more when she swallows. They do have an appt set up on 11/07/22 with [facility] to discuss. If patient does not want to investigate further or treat, they may consider comfort cares. They do have F/U appt scheduled with Dr Peters on 11/18/22.  Mandy MCCOY RN

## 2022-10-28 NOTE — TELEPHONE ENCOUNTER
Michelet returned call. Notified of medication and ua orders. Will be stopping to  UA collection kit.

## 2022-10-28 NOTE — TELEPHONE ENCOUNTER
Spoke with the lab-lab states the sample patient gave was fine. There was no stool in the sample.   I called the patient, no need to bring in another sample as this one has been reported already. I informed patient that provider will review on Monday. It doesn't appear to be a bladder infection.

## 2022-10-28 NOTE — TELEPHONE ENCOUNTER
UA ordered as well as medrol dose pack, Left message for Benton to return call to discuss.  Mandy MCCOY RN

## 2022-11-18 NOTE — PROGRESS NOTES
[facility]  [address] [place] [id number]  Phone: [phone]  Fax: [phone]  Primary Provider: Suzy Peters  Pre-op Performing Provider: SUZY PETERS      PREOPERATIVE EVALUATION:  Today's date: 11/18/2022    Tari Wiley is a 77 year old female who presents for a preoperative evaluation.    Surgical Information:  Surgery/Procedure: Esophagogastroduodenoscopy  Surgery Location: [facility]  Surgeon: Dr. Liza Mondragon  Surgery Date: 11/25/22  Time of Surgery: 7:30am  Where patient plans to recover: At home with family  Fax number for surgical facility: [phone]    Type of Anesthesia Anticipated: to be determined    Assessment & Plan     The proposed surgical procedure is considered INTERMEDIATE risk.    Preop general physical exam    - CBC with platelets; Future  - CBC with platelets    Esophageal stricture  With weight loss not eating much at all now     Chronic obstructive pulmonary disease, unspecified COPD type (H)  Oxygen dependent stable     Stage 3 chronic kidney disease, unspecified whether stage 3a or 3b CKD (H)    - BASIC METABOLIC PANEL; Future  - BASIC METABOLIC PANEL    Hyperlipidemia LDL goal <130      Major depressive disorder, single episode, mild (H)  Stable no change in treatment plan.   - buPROPion (WELLBUTRIN) 75 MG tablet; Take 150 mg by mouth daily  - on sertraline     Need for prophylactic vaccination and inoculation against influenza    - INFLUENZA, QUAD, HIGH DOSE, PF, 65YR + (FLUZONE HD)  - ADMIN INFLUENZA (For MEDICARE Patients ONLY) []    High priority for 2019-nCoV vaccine    - COVID-19,PF,MODERNA BIVALENT 18+Yrs           Risks and Recommendations:  The patient has the following additional risks and recommendations for perioperative complications:  Pulmonary:    - Incentive spirometry post-op   - Oxygen dependent lung disease    Medication Instructions:  Patient is to take all scheduled  medications on the day of surgery    RECOMMENDATION:  APPROVAL GIVEN to proceed with proposed procedure, without further diagnostic evaluation.                      Subjective     HPI related to upcoming procedure: emesis and dysphagia       Preop Questions 11/18/2022   1. Have you ever had a heart attack or stroke? No   2. Have you ever had surgery on your heart or blood vessels, such as a stent placement, a coronary artery bypass, or surgery on an artery in your head, neck, heart, or legs? No   3. Do you have chest pain with activity? No   4. Do you have a history of  heart failure? No   5. Do you currently have a cold, bronchitis or symptoms of other infection? No   6. Do you have a cough, shortness of breath, or wheezing? YES - COPD   7. Do you or anyone in your family have previous history of blood clots? No   8. Do you or does anyone in your family have a serious bleeding problem such as prolonged bleeding following surgeries or cuts? No   9. Have you ever had problems with anemia or been told to take iron pills? No   10. Have you had any abnormal blood loss such as black, tarry or bloody stools, or abnormal vaginal bleeding? No   11. Have you ever had a blood transfusion? YES - many years ago   11a. Have you ever had a transfusion reaction? No   12. Are you willing to have a blood transfusion if it is medically needed before, during, or after your surgery? Yes   13. Have you or any of your relatives ever had problems with anesthesia? No   14. Do you have sleep apnea, excessive snoring or daytime drowsiness? No   15. Do you have any artifical heart valves or other implanted medical devices like a pacemaker, defibrillator, or continuous glucose monitor? No   16. Do you have artificial joints? No   17. Are you allergic to latex? No   18. Is there any chance that you may be pregnant? -     Health Care Directive:  Patient has a Health Care Directive on file      Preoperative Review of :   reviewed -  controlled substances reflected in medication list.      Status of Chronic Conditions:  COPD - Patient has a longstanding history of moderate-severe COPD . Patient has been doing well overall noting NO SYMPTOMS and continues on medication regimen consisting of inhalers without adverse reactions or side effects.    DEPRESSION - Patient has a long history of Depression of moderate severity requiring medication for control with recent symptoms being stable..      Review of Systems  CONSTITUTIONAL: NEGATIVE for fever, chills, change in weight  INTEGUMENTARY/SKIN: NEGATIVE for worrisome rashes, moles or lesions  EYES: NEGATIVE for vision changes or irritation  ENT/MOUTH: NEGATIVE for ear, mouth and throat problems  RESP: NEGATIVE for significant cough or SOB  CV: NEGATIVE for chest pain, palpitations or peripheral edema  GI: NEGATIVE for nausea, abdominal pain, heartburn, or change in bowel habits  : NEGATIVE for frequency, dysuria, or hematuria  MUSCULOSKELETAL: NEGATIVE for significant arthralgias or myalgia  NEURO: NEGATIVE for weakness, dizziness or paresthesias  ENDOCRINE: NEGATIVE for temperature intolerance, skin/hair changes  HEME: NEGATIVE for bleeding problems  PSYCHIATRIC: NEGATIVE for changes in mood or affect    Patient Active Problem List    Diagnosis Date Noted     Pneumonia 05/28/2012     Priority: High     Major depressive disorder, single episode, mild (H) 06/16/2022     Priority: Medium     Unspecified severe protein-calorie malnutrition (H) 06/10/2022     Priority: Medium     Chronic kidney disease, stage 3 (H) 03/24/2021     Priority: Medium     Esophageal candidiasis (H) 04/02/2019     Priority: Medium     Esophageal stenosis 04/02/2019     Priority: Medium     Mammogram declined 02/28/2017     Priority: Medium     Health Care Home-Not active 09/08/2014     Priority: Medium     State Tier Level:    Status:  Unable to reach  Care Coordinator:  Talita Peña    See Letters for [facility] Care Plan  Date:   2014           Gastroenteritis 2012     Priority: Medium     Thrush, oral 2011     Priority: Medium     Anemia 2011     Priority: Medium     Generalized weakness 11/10/2011     Priority: Medium     Personal history of smoking 2010     Priority: Medium     Hyperlipidemia LDL goal <130 10/31/2010     Priority: Medium     COPD (chronic obstructive pulmonary disease) (HCC) 06/10/2005     Priority: Medium     COPD exacerbation (H) 06/10/2005     Priority: Medium     Generalized anxiety disorder 2011     Priority: Low     Diagnosis updated by automated process. Provider to review and confirm.        Past Medical History:   Diagnosis Date     ROE (generalised anxiety disorder)      Hyperlipidemia LDL goal < 130      Tobacco abuse      Past Surgical History:   Procedure Laterality Date     APPENDECTOMY       C/SECTION, LOW TRANSVERSE  1982    , Low Transverse     CATARACT IOL, RT/LT      right-2013     CHOLECYSTECTOMY, OPEN       ESOPHAGOSCOPY, GASTROSCOPY, DUODENOSCOPY (EGD), COMBINED N/A 2019    Procedure: COMBINED ESOPHAGOSCOPY, GASTROSCOPY, DUODENOSCOPY (EGD);  Surgeon: Justin Davila MD;  Location: [facility]     ESOPHAGOSCOPY, GASTROSCOPY, DUODENOSCOPY (EGD), DILATATION, COMBINED N/A 2019    Procedure: Upper Endoscopy With Dilation;  Surgeon: John Perez MD;  Location: [facility]     ESOPHAGOSCOPY, GASTROSCOPY, DUODENOSCOPY (EGD), RESECT MUCOSA, COMBINED N/A 3/1/2019    Procedure: Upper Endoscopy With EMR, and brushings;  Surgeon: John Perez MD;  Location: [facility]     SURGICAL HISTORY OF -       stent to biliary tract     Current Outpatient Medications   Medication Sig Dispense Refill     acetaminophen (TYLENOL) 500 MG tablet Take 2 tablets (1,000 mg) by mouth 2 times daily       albuterol (PROVENTIL) (2.5 MG/3ML) 0.083% neb solution USE ONE VIAL IN NEBULIZER EVERY 4 HOURS AS NEEDED FOR SHORTNESS OF BREATH /DYSPNEA 75 mL 0     aspirin (ASA) 81 MG  chewable tablet Take 1 tablet (81 mg) by mouth daily       buPROPion (WELLBUTRIN) 75 MG tablet Take 150 mg by mouth twice daily (Patient taking differently: 2 tablets twice a day) 360 tablet 1     fluticasone-salmeterol (ADVAIR DISKUS) 500-50 MCG/ACT inhaler INHALE 1 DOSE BY MOUTH EVERY 12 HOURS 3 each 3     hyoscyamine SL (LEVSIN/SL) 0.125 MG sublingual tablet Take 1 tablet (0.125 mg) by mouth 4 times daily (before meals and nightly) 120 tablet 1     LORazepam (ATIVAN) 0.5 MG tablet TAKE 1 TABLET BY MOUTH EVERY 8 HOURS AS NEEDED FOR ANXIETY 21 tablet 0     nystatin (MYCOSTATIN) 352793 UNIT/ML suspension SWISH AND SPIT 5ML TWICE DAILY BEFORE MEALS AS NEEDED. IF THRUSH DOES NOT IMPROVE INCREASE TO 4 TIMES DAILY FOR 7 DAYS 100 mL 3     omeprazole (PRILOSEC) 40 MG DR capsule TAKE 1 CAPSULE EVERY DAY 90 capsule 3     ondansetron (ZOFRAN-ODT) 4 MG ODT tab Take 1-2 tablets (4-8 mg) by mouth every 6 hours as needed for nausea 20 tablet 1     order for DME Equipment being ordered: Oxygen  Expected life-long  Oxygen -  Resting on room air - 88%   Resting on 2 L of oxygen - 92%  Activity on room air - 82%  Activity on 2 L of oxygen - 87% 1 Device 11     oxyCODONE (ROXICODONE) 5 MG tablet 1/2 tab daily as needed severe back pain 12 tablet 0     sertraline (ZOLOFT) 50 MG tablet TAKE 3 TABLETS EVERY  tablet 3     tiotropium (SPIRIVA RESPIMAT) 2.5 MCG/ACT inhaler Inhale 1 puff into the lungs daily 12 g 3     VENTOLIN  (90 Base) MCG/ACT inhaler Inhale 2 puffs into the lungs every 6 hours as needed for shortness of breath / dyspnea 18 g 0     azithromycin (ZITHROMAX) 250 MG tablet Two tablets first day, then one tablet daily for four days. (Patient not taking: Reported on 11/18/2022) 6 tablet 0     melatonin 3 MG tablet Take 3 tablets (9 mg) by mouth At Bedtime (Patient not taking: Reported on 9/28/2022)       methylPREDNISolone (MEDROL DOSEPAK) 4 MG tablet therapy pack Follow Package Directions (Patient not taking:  Reported on 2022) 21 tablet 0     methylPREDNISolone (MEDROL DOSEPAK) 4 MG tablet therapy pack Follow Package Directions (Patient not taking: Reported on 2022) 21 tablet 0     methylPREDNISolone (MEDROL DOSEPAK) 4 MG tablet therapy pack Follow Package Directions (Patient not taking: Reported on 2022) 21 tablet 0     SENNA-docusate sodium (SENNA S) 8.6-50 MG tablet Take 1 tablet by mouth 2 times daily (Patient not taking: Reported on 2022)         Allergies   Allergen Reactions     Sulfa Drugs Swelling and Difficulty breathing        Social History     Tobacco Use     Smoking status: Former     Packs/day: 4.00     Years: 36.00     Pack years: 144.00     Types: Cigarettes     Quit date: 1993     Years since quittin.9     Smokeless tobacco: Never     Tobacco comments:     Started at age 12   Substance Use Topics     Alcohol use: No       History   Drug Use No         Objective     /82   Pulse 113   Temp 97  F (36.1  C) (Tympanic)   Resp 16   Wt 31.2 kg (68 lb 12.8 oz)   LMP  (LMP Unknown)   SpO2 98%   BMI 13.44 kg/m      Physical Exam    GENERAL APPEARANCE: underweight     EYES: EOMI, PERRL     HENT: ear canals and TM's normal and nose and mouth without ulcers or lesions     NECK: no adenopathy, no asymmetry, masses, or scars and thyroid normal to palpation     RESP: lungs clear to auscultation - no rales, rhonchi or wheezes     CV: regular rates and rhythm, normal S1 S2, no S3 or S4 and no murmur, click or rub     ABDOMEN:  soft, nontender, no HSM or masses and bowel sounds normal     MS: extremities normal- no gross deformities noted, no evidence of inflammation in joints, FROM in all extremities.     SKIN: no suspicious lesions or rashes     NEURO: Normal strength and tone, sensory exam grossly normal, mentation intact and speech normal     PSYCH: mentation appears normal. and affect normal/bright     LYMPHATICS: No cervical adenopathy    Recent Labs   Lab Test  07/29/21  0600 07/21/21  0750 07/13/21  2250   HGB 10.2* 9.6* 10.9*   * 545* 432   INR  --   --  1.00   NA  --  140 142   POTASSIUM  --  4.3 3.6   CR  --  0.79 0.83        Diagnostics:  Labs pending at this time.  Results will be reviewed when available.   No EKG required, no history of coronary heart disease, significant arrhythmia, peripheral arterial disease or other structural heart disease.    Revised Cardiac Risk Index (RCRI):  The patient has the following serious cardiovascular risks for perioperative complications:   - No serious cardiac risks = 0 points     RCRI Interpretation: 0 points: Class I (very low risk - 0.4% complication rate)           Signed Electronically by: Suzy Peters MD  Copy of this evaluation report is provided to requesting physician.

## 2022-11-18 NOTE — NURSING NOTE
No chief complaint on file.    /82   Pulse 113   Temp 97  F (36.1  C) (Tympanic)   Resp 16   Wt 31.2 kg (68 lb 12.8 oz)   LMP  (LMP Unknown)   SpO2 98%   BMI 13.44 kg/m   Estimated body mass index is 13.44 kg/m  as calculated from the following:    Height as of 7/22/22: 1.524 m (5').    Weight as of this encounter: 31.2 kg (68 lb 12.8 oz).  Patient presents to the clinic using Wheel Chair      Health Maintenance that is potentially due pending provider review:    Health Maintenance Due   Topic Date Due     DEXA  Never done     MICROALBUMIN  Never done     SPIROMETRY  Never done     URINE DRUG SCREEN  Never done     ANNUAL REVIEW OF  ORDERS  Never done     HEPATITIS C SCREENING  Never done     LIPID  02/26/2009     MEDICARE ANNUAL WELLNESS VISIT  Never done     ZOSTER IMMUNIZATION (2 of 3) 12/01/2014     BMP  07/21/2022     INFLUENZA VACCINE (1) 09/01/2022     HEMOGLOBIN  07/29/2022        n/a

## 2022-11-18 NOTE — PATIENT INSTRUCTIONS
Preparing for Your Surgery  Getting started  A nurse will call you to review your health history and instructions. They will give you an arrival time based on your scheduled surgery time. Please be ready to share:    Your doctor s clinic name and phone number    Your medical, surgical, and anesthesia history    A list of allergies and sensitivities    A list of medicines, including herbal treatments and over-the-counter drugs    Whether the patient has a legal guardian (ask how to send us the papers in advance)  Please tell us if you re pregnant--or if there s any chance you might be pregnant. Some surgeries may injure a fetus (unborn baby), so they require a pregnancy test. Surgeries that are safe for a fetus don t always need a test, and you can choose whether to have one.   If you have a child who s having surgery, please ask for a copy of Preparing for Your Child s Surgery.    Preparing for surgery    Within 10 to 30 days of surgery: Have a pre-op exam (sometimes called an H&P, or History and Physical). This can be done at a clinic or pre-operative center.  ? If you re having a , you may not need this exam. Talk to your care team.    At your pre-op exam, talk to your care team about all medicines you take. If you need to stop any medicines before surgery, ask when to start taking them again.  ? We do this for your safety. Many medicines can make you bleed too much during surgery. Some change how well surgery (anesthesia) drugs work.    Call your insurance company to let them know you re having surgery. (If you don t have insurance, call [phone].)    Call your clinic if there s any change in your health. This includes signs of a cold or flu (sore throat, runny nose, cough, rash, fever). It also includes a scrape or scratch near the surgery site.    If you have questions on the day of surgery, call your hospital or surgery center.  COVID testing  You may need to be tested for COVID-19 before having  surgery. If so, we will give you instructions (or click here).  Eating and drinking guidelines  For your safety: Unless your surgeon tells you otherwise, follow the guidelines below.    Eat and drink as usual until 8 hours before you arrive for surgery. After that, no food or milk.    Drink clear liquids until 2 hours before you arrive. These are liquids you can see through, like water, Gatorade, and Propel Water. They also include plain black coffee and tea (no cream or milk), candy, and breath mints. You can spit out gum when you arrive.    If you drink alcohol: Stop drinking it the night before surgery.    If your care team tells you to take medicine on the morning of surgery, it s okay to take it with a sip of water.  Preventing infection    Shower or bathe the night before and morning of your surgery. Follow the instructions your clinic gave you. (If no instructions, use regular soap.)    Don t shave or clip hair near your surgery site. We ll remove the hair if needed.    Don t smoke or vape the morning of surgery. You may chew nicotine gum up to 2 hours before surgery. A nicotine patch is okay.  ? Note: Some surgeries require you to completely quit smoking and nicotine. Check with your surgeon.    Your care team will make every effort to keep you safe from infection. We will:  ? Clean our hands often with soap and water (or an alcohol-based hand rub).  ? Clean the skin at your surgery site with a special soap that kills germs.  ? Give you a special gown to keep you warm. (Cold raises the risk of infection.)  ? Wear special hair covers, masks, gowns and gloves during surgery.  ? Give antibiotic medicine, if prescribed. Not all surgeries need antibiotics.  What to bring on the day of surgery    Photo ID and insurance card    Copy of your health care directive, if you have one    Glasses and hearing aids (bring cases)  ? You can t wear contacts during surgery    Inhaler and eye drops, if you use them (tell us  about these when you arrive)    CPAP machine or breathing device, if you use them    A few personal items, if spending the night    If you have . . .  ? A pacemaker, ICD (cardiac defibrillator) or other implant: Bring the ID card.  ? An implanted stimulator: Bring the remote control.  ? A legal guardian: Bring a copy of the certified (court-stamped) guardianship papers.  Please remove any jewelry, including body piercings. Leave jewelry and other valuables at home.  If you re going home the day of surgery    You must have a responsible adult drive you home. They should stay with you overnight as well.    If you don t have someone to stay with you, and you aren t safe to go home alone, we may keep you overnight. Insurance often won t pay for this.  After surgery  If it s hard to control your pain or you need more pain medicine, please call your surgeon s office.  Questions?   If you have any questions for your care team, list them here:   ____________________________________________________________________________________________________________________________________________________________________________________________________________________________________________________________________  For informational purposes only. Not to replace the advice of your health care provider. Copyright   2003, 2019 [employer]. All rights reserved. Clinically reviewed by Harini Beverly MD. [employer]mo [id number] - REV 10/22.

## 2022-12-27 NOTE — TELEPHONE ENCOUNTER
Pt made aware.   RX [place] reviewed:  reviewed- no concerns    [place]  [email or web address].[facility].[employer]i/

## 2023-01-01 ENCOUNTER — MEDICAL CORRESPONDENCE (OUTPATIENT)
Dept: HEALTH INFORMATION MANAGEMENT | Facility: CLINIC | Age: 78
End: 2023-01-01

## 2023-11-30 NOTE — TELEPHONE ENCOUNTER
FYI~ A refill has been made to the  assistance programs for  Spiriva, Advair Diskus, and Ventolin HFA.     A 90 day supply of SPIRVIA, ADVAIR DISKUS, AND VENTOLIN HFA will be delivered to Saige's home within 7-10 business days.    Thank you,    Cheryl Pierre  Prescription Assistance      He has very mild/borderline spleen enlargement. I would like to wait for his mono test to come back before I decide his return to sports.

## (undated) DEVICE — ENDO BITE BLOCK ADULT OMNI-BLOC

## (undated) DEVICE — ENDO TUBING CO2 SMARTCAP STERILE DISP 100145CO2EXT

## (undated) DEVICE — SPECIMEN TRAP POLYP 4 CHAMBER EZ710202 EZ710202

## (undated) DEVICE — SOL WATER IRRIG 1000ML BOTTLE 2F7114

## (undated) DEVICE — ENDO DEVICE LOCKING AND BIOPSY CAP M00545261

## (undated) DEVICE — KIT CONNECTOR FOR OLYMPUS ENDOSCOPES DEFENDO 100310

## (undated) DEVICE — PAD CHUX UNDERPAD 23X24" 7136

## (undated) DEVICE — ENDO CAP AND TUBING STERILE FOR ENDOGATOR  100130

## (undated) DEVICE — ENDO SNARE EXACTO COLD 9MM LOOP 2.4MMX230CM 00711115

## (undated) DEVICE — ENDO BRUSH CYTOLOGY GASTROSCOPE 2.0MMX180CM G22409

## (undated) DEVICE — SUCTION MANIFOLD DORNOCH ULTRA CART UL-CL500

## (undated) DEVICE — KIT ENDO FIRST STEP DISINFECTANT 200ML W/POUCH EP-4

## (undated) DEVICE — PACK ENDOSCOPY GI CUSTOM UMMC

## (undated) DEVICE — TAPE CLOTH 3" CARDINAL 3TRCL03

## (undated) RX ORDER — IPRATROPIUM BROMIDE AND ALBUTEROL SULFATE 2.5; .5 MG/3ML; MG/3ML
SOLUTION RESPIRATORY (INHALATION)
Status: DISPENSED
Start: 2019-05-16

## (undated) RX ORDER — FENTANYL CITRATE 50 UG/ML
INJECTION, SOLUTION INTRAMUSCULAR; INTRAVENOUS
Status: DISPENSED
Start: 2019-05-16

## (undated) RX ORDER — GLYCOPYRROLATE 0.2 MG/ML
INJECTION, SOLUTION INTRAMUSCULAR; INTRAVENOUS
Status: DISPENSED
Start: 2019-01-28

## (undated) RX ORDER — FENTANYL CITRATE 50 UG/ML
INJECTION, SOLUTION INTRAMUSCULAR; INTRAVENOUS
Status: DISPENSED
Start: 2019-03-01

## (undated) RX ORDER — LIDOCAINE HYDROCHLORIDE 10 MG/ML
INJECTION, SOLUTION EPIDURAL; INFILTRATION; INTRACAUDAL; PERINEURAL
Status: DISPENSED
Start: 2019-01-28